# Patient Record
Sex: MALE | Race: WHITE | Employment: OTHER | URBAN - METROPOLITAN AREA
[De-identification: names, ages, dates, MRNs, and addresses within clinical notes are randomized per-mention and may not be internally consistent; named-entity substitution may affect disease eponyms.]

---

## 2017-08-01 ENCOUNTER — HOSPITAL ENCOUNTER (OUTPATIENT)
Dept: LAB | Age: 77
Discharge: HOME | End: 2017-08-01
Payer: MEDICARE

## 2017-08-01 PROCEDURE — 88305 TISSUE EXAM BY PATHOLOGIST: CPT | Performed by: SURGERY

## 2018-03-03 ENCOUNTER — ANESTHESIA EVENT (OUTPATIENT)
Dept: ENDOSCOPY | Age: 78
End: 2018-03-03
Payer: MEDICARE

## 2018-03-05 ENCOUNTER — HOSPITAL ENCOUNTER (OUTPATIENT)
Age: 78
Setting detail: OUTPATIENT SURGERY
Discharge: HOME | End: 2018-03-05
Attending: INTERNAL MEDICINE | Admitting: INTERNAL MEDICINE
Payer: MEDICARE

## 2018-03-05 ENCOUNTER — ANESTHESIA (OUTPATIENT)
Dept: ENDOSCOPY | Age: 78
End: 2018-03-05
Payer: MEDICARE

## 2018-03-05 VITALS
HEART RATE: 66 BPM | WEIGHT: 155 LBS | DIASTOLIC BLOOD PRESSURE: 48 MMHG | TEMPERATURE: 97.9 F | BODY MASS INDEX: 24.91 KG/M2 | HEIGHT: 66 IN | OXYGEN SATURATION: 100 % | RESPIRATION RATE: 18 BRPM | SYSTOLIC BLOOD PRESSURE: 149 MMHG

## 2018-03-05 LAB
GLUCOSE BLD STRIP.AUTO-MCNC: 68 MG/DL (ref 70–110)
GLUCOSE BLD STRIP.AUTO-MCNC: 69 MG/DL (ref 70–110)

## 2018-03-05 PROCEDURE — 74011250636 HC RX REV CODE- 250/636: Performed by: NURSE ANESTHETIST, CERTIFIED REGISTERED

## 2018-03-05 PROCEDURE — 76060000032 HC ANESTHESIA 0.5 TO 1 HR: Performed by: INTERNAL MEDICINE

## 2018-03-05 PROCEDURE — 74011000250 HC RX REV CODE- 250

## 2018-03-05 PROCEDURE — 77030038604 HC SNR ENDO EXACTO USEN -B: Performed by: INTERNAL MEDICINE

## 2018-03-05 PROCEDURE — 77030009426 HC FCPS BIOP ENDOSC BSC -B: Performed by: INTERNAL MEDICINE

## 2018-03-05 PROCEDURE — 88305 TISSUE EXAM BY PATHOLOGIST: CPT | Performed by: INTERNAL MEDICINE

## 2018-03-05 PROCEDURE — 74011250637 HC RX REV CODE- 250/637: Performed by: INTERNAL MEDICINE

## 2018-03-05 PROCEDURE — 82962 GLUCOSE BLOOD TEST: CPT

## 2018-03-05 PROCEDURE — 74011250636 HC RX REV CODE- 250/636

## 2018-03-05 PROCEDURE — 76040000007: Performed by: INTERNAL MEDICINE

## 2018-03-05 RX ORDER — DEXTROMETHORPHAN/PSEUDOEPHED 2.5-7.5/.8
1.2 DROPS ORAL
Status: CANCELLED | OUTPATIENT
Start: 2018-03-05

## 2018-03-05 RX ORDER — SODIUM CHLORIDE 0.9 % (FLUSH) 0.9 %
5-10 SYRINGE (ML) INJECTION EVERY 8 HOURS
Status: CANCELLED | OUTPATIENT
Start: 2018-03-05 | End: 2018-03-05

## 2018-03-05 RX ORDER — SODIUM CHLORIDE 0.9 % (FLUSH) 0.9 %
5-10 SYRINGE (ML) INJECTION AS NEEDED
Status: CANCELLED | OUTPATIENT
Start: 2018-03-05 | End: 2018-03-05

## 2018-03-05 RX ORDER — NALOXONE HYDROCHLORIDE 0.4 MG/ML
0.4 INJECTION, SOLUTION INTRAMUSCULAR; INTRAVENOUS; SUBCUTANEOUS
Status: CANCELLED | OUTPATIENT
Start: 2018-03-05 | End: 2018-03-05

## 2018-03-05 RX ORDER — SODIUM CHLORIDE, SODIUM LACTATE, POTASSIUM CHLORIDE, CALCIUM CHLORIDE 600; 310; 30; 20 MG/100ML; MG/100ML; MG/100ML; MG/100ML
75 INJECTION, SOLUTION INTRAVENOUS CONTINUOUS
Status: DISCONTINUED | OUTPATIENT
Start: 2018-03-05 | End: 2018-03-05 | Stop reason: HOSPADM

## 2018-03-05 RX ORDER — PROPOFOL 10 MG/ML
INJECTION, EMULSION INTRAVENOUS AS NEEDED
Status: DISCONTINUED | OUTPATIENT
Start: 2018-03-05 | End: 2018-03-05 | Stop reason: HOSPADM

## 2018-03-05 RX ORDER — ATROPINE SULFATE 0.1 MG/ML
0.5 INJECTION INTRAVENOUS
Status: CANCELLED | OUTPATIENT
Start: 2018-03-05 | End: 2018-03-05

## 2018-03-05 RX ORDER — FLUMAZENIL 0.1 MG/ML
0.2 INJECTION INTRAVENOUS
Status: CANCELLED | OUTPATIENT
Start: 2018-03-05 | End: 2018-03-05

## 2018-03-05 RX ORDER — SODIUM CHLORIDE 0.9 % (FLUSH) 0.9 %
5-10 SYRINGE (ML) INJECTION EVERY 8 HOURS
Status: DISCONTINUED | OUTPATIENT
Start: 2018-03-05 | End: 2018-03-05 | Stop reason: HOSPADM

## 2018-03-05 RX ORDER — EPINEPHRINE 0.1 MG/ML
1 INJECTION INTRACARDIAC; INTRAVENOUS
Status: CANCELLED | OUTPATIENT
Start: 2018-03-05 | End: 2018-03-05

## 2018-03-05 RX ORDER — INSULIN LISPRO 100 [IU]/ML
INJECTION, SOLUTION INTRAVENOUS; SUBCUTANEOUS ONCE
Status: DISCONTINUED | OUTPATIENT
Start: 2018-03-05 | End: 2018-03-05 | Stop reason: HOSPADM

## 2018-03-05 RX ORDER — LIDOCAINE HYDROCHLORIDE 20 MG/ML
INJECTION, SOLUTION EPIDURAL; INFILTRATION; INTRACAUDAL; PERINEURAL AS NEEDED
Status: DISCONTINUED | OUTPATIENT
Start: 2018-03-05 | End: 2018-03-05 | Stop reason: HOSPADM

## 2018-03-05 RX ORDER — DEXTROMETHORPHAN/PSEUDOEPHED 2.5-7.5/.8
DROPS ORAL AS NEEDED
Status: DISCONTINUED | OUTPATIENT
Start: 2018-03-05 | End: 2018-03-05 | Stop reason: HOSPADM

## 2018-03-05 RX ORDER — EPHEDRINE SULFATE 50 MG/ML
INJECTION, SOLUTION INTRAVENOUS AS NEEDED
Status: DISCONTINUED | OUTPATIENT
Start: 2018-03-05 | End: 2018-03-05 | Stop reason: HOSPADM

## 2018-03-05 RX ORDER — SODIUM CHLORIDE 0.9 % (FLUSH) 0.9 %
5-10 SYRINGE (ML) INJECTION AS NEEDED
Status: DISCONTINUED | OUTPATIENT
Start: 2018-03-05 | End: 2018-03-05 | Stop reason: HOSPADM

## 2018-03-05 RX ADMIN — PROPOFOL 50 MG: 10 INJECTION, EMULSION INTRAVENOUS at 11:44

## 2018-03-05 RX ADMIN — PROPOFOL 100 MG: 10 INJECTION, EMULSION INTRAVENOUS at 11:32

## 2018-03-05 RX ADMIN — PROPOFOL 50 MG: 10 INJECTION, EMULSION INTRAVENOUS at 11:35

## 2018-03-05 RX ADMIN — EPHEDRINE SULFATE 5 MG: 50 INJECTION, SOLUTION INTRAVENOUS at 11:45

## 2018-03-05 RX ADMIN — PROPOFOL 50 MG: 10 INJECTION, EMULSION INTRAVENOUS at 11:54

## 2018-03-05 RX ADMIN — LIDOCAINE HYDROCHLORIDE 40 MG: 20 INJECTION, SOLUTION EPIDURAL; INFILTRATION; INTRACAUDAL; PERINEURAL at 11:32

## 2018-03-05 RX ADMIN — EPHEDRINE SULFATE 5 MG: 50 INJECTION, SOLUTION INTRAVENOUS at 11:39

## 2018-03-05 NOTE — PROCEDURES
[email or web address]  [place], [address]      Brief Procedure Note    Lurdes Gram  1940  [id number]    Date of Procedure: 3/5/2018    Preoperative diagnosis: -V12.72 - Z86.010,  Personal history of colonic polyps  250.00,  NIDDM  401.9 - I10,  Hypertension  F03.90,  Mild dementia    Postoperative diagnosis: cecal polyp, ascending polyp, diverticulosis, descending polyp, small internal hemorrhoids    Type of Anesthesia: MAC (Monitored anesthesia care)    Description of findings: same as post op dx    Procedure: Procedure(s):  COLONOSCOPY w/ biopsies w/ polypectomy    :  Dr. Geovanna Sanchez MD    Assistant(s): Endoscopy Technician-1: Diego Jennings  Endoscopy RN-1: Shira Duval RN    EBL:None    Specimens:   ID Type Source Tests Collected by Time Destination   1 : cecal polyp bxs Preservative Cecum  Geovanna Sanchez MD 3/5/2018 1141 Pathology   2 : ascending polyp bxs Preservative Colon, Ascending  Geovanna Sanchez MD 3/5/2018 1145 Pathology   3 : descending polyp Preservative Colon, Descending  Geovanna Sanchez MD 3/5/2018 1154 Pathology       Findings: See printed and scanned procedure note    Complications: None    Dr. Geovanna Sanchez MD  3/5/2018  12:05 PM

## 2018-03-05 NOTE — ANESTHESIA PREPROCEDURE EVALUATION
Anesthetic History   No history of anesthetic complications            Review of Systems / Medical History  Patient summary reviewed and pertinent labs reviewed    Pulmonary          Shortness of breath      Comments: H/o of resp failure   Neuro/Psych         Psychiatric history     Cardiovascular    Hypertension: poorly controlled        Dysrhythmias : atrial fibrillation  Hyperlipidemia    Exercise tolerance: <4 METS     GI/Hepatic/Renal     GERD  Hepatitis         Endo/Other    Diabetes: type 2         Other Findings   Comments: Documentation of current medication  Current medications obtained, documented and obtained? YES      Risk Factors for Postoperative nausea/vomiting:       History of postoperative nausea/vomiting? NO       Female? NO       Motion sickness? NO       Intended opioid administration for postoperative analgesia? NO      Smoking Abstinence:  Current Smoker? NO  Elective Surgery? YES  Seen preoperatively by anesthesiologist or proxy prior to day of surgery? YES  Pt abstained from smoking 24 hours prior to anesthesia?  YES    Preventive care/screening for High Blood Pressure:  Aged 18 years and older: YES  Screened for high blood pressure: YES  Patients with high blood pressure referred to primary care provider   for BP management: YES                 Physical Exam    Airway  Mallampati: III  TM Distance: > 6 cm  Neck ROM: normal range of motion   Mouth opening: Normal     Cardiovascular    Rhythm: irregular  Rate: normal         Dental    Dentition: Edentulous     Pulmonary      Decreased breath sounds: bilateral           Abdominal  GI exam deferred       Other Findings            Anesthetic Plan    ASA: 3  Anesthesia type: MAC          Induction: Intravenous  Anesthetic plan and risks discussed with: Patient

## 2018-03-05 NOTE — DISCHARGE INSTRUCTIONS
Colonoscopy: What to Expect at [address] [place] [address]  After you have a colonoscopy, you will stay at the clinic for 1 to 2 hours until the medicines wear off. Then you can go home. But you will need to arrange for a ride. Your doctor will tell you when you can eat and do your other usual activities. Your doctor will talk to you about when you will need your next colonoscopy. Your doctor can help you decide how often you need to be checked. This will depend on the results of your test and your risk for colorectal cancer. After the test, you may be bloated or have gas pains. You may need to pass gas. If a biopsy was done or a polyp was removed, you may have streaks of blood in your stool (feces) for a few days. This care sheet gives you a general idea about how long it will take for you to recover. But each person recovers at a different pace. Follow the steps below to get better as quickly as possible. How can you care for yourself at home? Activity  · Rest when you feel tired. · You can do your normal activities when it feels okay to do so. Diet  · Follow your doctor's directions for eating. · Unless your doctor has told you not to, drink plenty of fluids. This helps to replace the fluids that were lost during the colon prep. · Do not drink alcohol. Medicines  · If polyps were removed or a biopsy was done during the test, your doctor may tell you not to take aspirin or other anti-inflammatory medicines for a few days. These include ibuprofen (Advil, Motrin) and naproxen (Aleve). Other instructions  · For your safety, do not drive or operate machinery until the medicine wears off and you can think clearly. Your doctor may tell you not to drive or operate machinery until the day after your test.  · Do not sign legal documents or make major decisions until the medicine wears off and you can think clearly. The anesthesia can make it hard for you to fully understand what you are agreeing to.   Follow-up care is a key part of your treatment and safety. Be sure to make and go to all appointments, and call your doctor if you are having problems. It's also a good idea to know your test results and keep a list of the medicines you take. When should you call for help? Call 911 anytime you think you may need emergency care. For example, call if:  · You passed out (lost consciousness). · You pass maroon or bloody stools. · You have severe belly pain. Call your doctor now or seek immediate medical care if:  · Your stools are black and tarlike. · Your stools have streaks of blood, but you did not have a biopsy or any polyps removed. · You have belly pain, or your belly is swollen and firm. · You vomit. · You have a fever. · You are very dizzy. Watch closely for changes in your health, and be sure to contact your doctor if you have any problems. Where can you learn more? Go to [employer]  Enter E264 in the search box to learn more about \"Colonoscopy: What to Expect at Home. \"   © 1196-1148 Healthwise, Incorporated. Care instructions adapted under license by Cristina Alvarado (which disclaims liability or warranty for this information). This care instruction is for use with your licensed healthcare professional. If you have questions about a medical condition or this instruction, always ask your healthcare professional. [address] any warranty or liability for your use of this information. Content Version: 11.1.860247; Current as of: November 14, 2014     Colon Polyps: Care Instructions  Your Care Instructions    Colon polyps are growths in the colon or the rectum. The cause of most colon polyps is not known, and most people who get them do not have any problems. But a certain kind can turn into cancer. For this reason, regular testing for colon polyps is important for people age 48 and older and anyone who has an increased risk for colon cancer.   Polyps are usually found through routine colon cancer screening tests. Although most colon polyps are not cancerous, they are usually removed and then tested for cancer. Screening for colon cancer saves lives because the cancer can usually be cured if it is caught early. If you have a polyp that is the type that can turn into cancer, you may need more tests to examine your entire colon. The doctor will remove any other polyps that he or she finds, and you will be tested more often. Follow-up care is a key part of your treatment and safety. Be sure to make and go to all appointments, and call your doctor if you are having problems. It's also a good idea to know your test results and keep a list of the medicines you take. How can you care for yourself at home? Regular exams to look for colon polyps are the best way to prevent polyps from turning into colon cancer. These can include stool tests, sigmoidoscopy, colonoscopy, and CT colonography. Talk with your doctor about a testing schedule that is right for you. To prevent polyps  There is no home treatment that can prevent colon polyps. But these steps may help lower your risk for cancer. · Stay active. Being active can help you get to and stay at a healthy weight. Try to exercise on most days of the week. Walking is a good choice. · Eat well. Choose a variety of vegetables, fruits, legumes (such as peas and beans), fish, poultry, and whole grains. · Do not smoke. If you need help quitting, talk to your doctor about stop-smoking programs and medicines. These can increase your chances of quitting for good. · If you drink alcohol, limit how much you drink. Limit alcohol to 2 drinks a day for men and 1 drink a day for women. When should you call for help? Call your doctor now or seek immediate medical care if:  ? · You have severe belly pain. ? · Your stools are maroon or very bloody. ? Watch closely for changes in your health, and be sure to contact your doctor if:  ? · You have a fever.   ? · You have nausea or vomiting. ? · You have a change in bowel habits (new constipation or diarrhea). ? · Your symptoms get worse or are not improving as expected. Where can you learn more? Go to http://alyce-dalila.info/. Enter 95 [id number] in the search box to learn more about \"Colon Polyps: Care Instructions. \"  Current as of: May 12, 2017  Content Version: 11.4  © 2607-1080 [employer]. Care instructions adapted under license by [employer] (which disclaims liability or warranty for this information). If you have questions about a medical condition or this instruction, always ask your healthcare professional. [place] [address] any warranty or liability for your use of this information. Learning About Diverticulosis and Diverticulitis  What are diverticulosis and diverticulitis? In diverticulosis and diverticulitis, pouches called diverticula form in the wall of the large intestine, or colon. · In diverticulosis, the pouches do not cause any pain or other symptoms. · In diverticulitis, the pouches get inflamed or infected and cause symptoms. Doctors aren't sure what causes these pouches in the colon. But they think that a low-fiber diet may play a role. Without fiber to add bulk to the stool, the colon has to work harder than normal to push the stool forward. The pressure from this may cause pouches to form in weak spots along the colon. Some people with diverticulosis get diverticulitis. But experts don't know why this happens. What are the symptoms? · In diverticulosis, most people don't have symptoms. But pouches sometimes bleed. · In diverticulitis, symptoms may last from a few hours to a week or more. They include:  ¨ Belly pain. This is usually in the lower left side. It is sometimes worse when you move. This is the most common symptom. ¨ Fever and chills. ¨ Bloating and gas. ¨ Diarrhea or constipation.   ¨ Nausea and sometimes vomiting. ¨ Not feeling like eating. How can you prevent these problems? You may be able to lower your chance of getting diverticulitis. You can do this by taking steps to prevent constipation. · Eat fruits, vegetables, beans, and whole grains every day. These foods are high in fiber. · Drink plenty of fluids (enough so that your urine is light yellow or clear like water). If you have kidney, heart, or liver disease and have to limit fluids, talk with your doctor before you increase the amount of fluids you drink. · Get at least 30 minutes of exercise on most days of the week. Walking is a good choice. You also may want to do other activities, such as running, swimming, cycling, or playing tennis or team sports. · Take a fiber supplement, such as Citrucel or Metamucil, every day if needed. Read and follow all instructions on the label. · Schedule time each day for a bowel movement. Having a daily routine may help. Take your time and do not strain when having a bowel movement. Some people avoid nuts, seeds, berries, and popcorn. They believe that these foods might get trapped in the diverticula and cause pain. But there is no proof that these foods cause diverticulitis or make it worse. How are these problems treated? · The best way to treat diverticulosis is to avoid constipation. (See the tips above.)  · Treatment for diverticulitis includes antibiotics and often a change in your diet. You may need only liquids at first. Your doctor may suggest pain medicines for pain or belly cramps. In some cases, surgery may be needed. Follow-up care is a key part of your treatment and safety. Be sure to make and go to all appointments, and call your doctor if you are having problems. It's also a good idea to know your test results and keep a list of the medicines you take. Where can you learn more? Go to http://alyce-dalila.info/.   Enter I564 in the search box to learn more about \"Learning About Diverticulosis and Diverticulitis. \"  Current as of: May 12, 2017  Content Version: 11.4  © 9789-0255 [employer]. Care instructions adapted under license by [employer] (which disclaims liability or warranty for this information). If you have questions about a medical condition or this instruction, always ask your healthcare professional. [address] any warranty or liability for your use of this information. High-Fiber Diet: Care Instructions  Your Care Instructions    A high-fiber diet may help you relieve constipation and feel less bloated. Your doctor and dietitian will help you make a high-fiber eating plan based on your personal needs. The plan will include the things you like to eat. It will also make sure that you get 30 grams of fiber a day. Before you make changes to the way you eat, be sure to talk with your doctor or dietitian. Follow-up care is a key part of your treatment and safety. Be sure to make and go to all appointments, and call your doctor if you are having problems. It's also a good idea to know your test results and keep a list of the medicines you take. How can you care for yourself at home? · You can increase how much fiber you get if you eat more of certain foods. These foods include:  ¨ Whole-grain breads and cereals. ¨ Fruits, such as pears, apples, and peaches. Eat the skins, peels, and seeds, if you can. ¨ Vegetables, such as broccoli, cabbage, spinach, carrots, asparagus, and squash. ¨ Starchy vegetables. These include potatoes with skins, kidney beans, and lima beans. · Take a fiber supplement every day if your doctor recommends it. Examples are Benefiber, Citrucel, FiberCon, and Metamucil. Ask your doctor how much to take. · Drink plenty of fluids, enough so that your urine is light yellow or clear like water.  If you have kidney, heart, or liver disease and have to limit fluids, talk with your doctor before you increase the amount of fluids you drink. · Get some exercise every day. Exercise helps stool move through the colon. It also helps prevent constipation. · Keep a food diary. Try to notice and write down what foods cause gas, pain, or other symptoms. Then you can avoid these foods. Where can you learn more? Go to http://alyce-dalila.info/. Enter C604 in the search box to learn more about \"High-Fiber Diet: Care Instructions. \"  Current as of: May 12, 2017  Content Version: 11.4  © 9863-8161 [employer]. Care instructions adapted under license by [employer] (which disclaims liability or warranty for this information). If you have questions about a medical condition or this instruction, always ask your healthcare professional. [address] any warranty or liability for your use of this information. DISCHARGE SUMMARY from Nurse     POST-PROCEDURE INSTRUCTIONS:    Call your Physician if you:  ? Observe any excess bleeding. ? Develop a temperature over 100.5o F.  ? Experience abdominal, shoulder or chest pain. ? Notice any signs of decreased circulation or nerve impairment to an extremity such as a change in color, persistent numbness, tingling, coldness or increase in pain. ? Vomit blood or you have nausea and vomiting lasting longer than 4 hours. ? Are unable to take medications. ? Are unable to urinate within 8 hours after discharge following general anesthesia or intravenous sedation. For the next 24 hours after receiving general anesthesia or intravenous sedation, or while taking prescription Narcotics, limit your activities:  ? Do NOT drive a motor vehicle, operate hazard machinery or power tools, or perform tasks that require coordination. The medication you received during your procedure may have some effect on your mental awareness. ? Do NOT make important personal or business decisions.   The medication you received during your procedure may have some effect on your mental awareness. ? Do NOT drink alcoholic beverages. These drinks do not mix well with the medications that have been given to you. ? Upon discharge from the hospital, you must be accompanied by a responsible adult. ? Resume your diet as directed by your physician. ? Resume medications as your physician has prescribed. ? Please give a list of your current medications to your Primary Care Provider. ? Please update this list whenever your medications are discontinued, doses are changed, or new medications (including over-the-counter products) are added. ? Please carry medication information at all times in case of emergency situations. These are general instructions for a healthy lifestyle:    No smoking/ No tobacco products/ Avoid exposure to second hand smoke.  Surgeon General's Warning:  Quitting smoking now greatly reduces serious risk to your health. Obesity, smoking, and a sedentary lifestyle greatly increase your risk for illness.  A healthy diet, regular physical exercise & weight monitoring are important for maintaining a healthy lifestyle   You may be retaining fluid if you have a history of heart failure or if you experience any of the following symptoms:  Weight gain of 3 pounds or more overnight or 5 pounds in a week, increased swelling in our hands or feet or shortness of breath while lying flat in bed. Please call your doctor as soon as you notice any of these symptoms; do not wait until your next office visit. Recognize signs and symptoms of STROKE:  F  -  Face looks uneven  A  -  Arms unable to move or move unevenly  S  -  Speech slurred or non-existent  T  -  Time to call 911 - as soon as signs and symptoms begin - DO NOT go back to bed or wait to see If you get better - TIME IS BRAIN. Colorectal Screening   Colorectal cancer almost always develops from precancerous polyps (abnormal growths) in the colon or rectum. Screening tests can find precancerous polyps, so that they can be removed before they turn into cancer. Screening tests can also find colorectal cancer early, when treatment works best.  [facility] Speak with your physician about when you should begin screening and how often you should be tested. Additional Information    If you have questions, please call [phone]. Remember, MyChart is NOT to be used for urgent needs. For medical emergencies, dial 911. Educational references and/or instructions provided during this visit included:    Colon Polyps and Diverticulosis      APPOINTMENTS:    Please make a follow-up appointment with your physician. Discharge information has been reviewed with the patient and spouse. The patient and spouse verbalized understanding.

## 2018-03-05 NOTE — ANESTHESIA POSTPROCEDURE EVALUATION
Post-Anesthesia Evaluation and Assessment    Patient: Thania Peterson MRN: [id number]  SSN: [id number]    YOB: 1940  Age: 69 y.o. Sex: male       Cardiovascular Function/Vital Signs  Visit Vitals    /67    Pulse 67    Temp 36.6 °C (97.9 °F)    Resp 16    Ht 5' 6\" (1.676 m)    Wt 70.3 kg (155 lb)    SpO2 98%    BMI 25.02 kg/m2       Patient is status post MAC anesthesia for Procedure(s):  COLONOSCOPY w/ biopsies w/ polypectomy. Nausea/Vomiting: None    Postoperative hydration reviewed and adequate. Pain:  Pain Scale 1: Numeric (0 - 10) (03/05/18 1104)  Pain Intensity 1: 0 (03/05/18 1104)   Managed    Neurological Status: At baseline    Mental Status and Level of Consciousness: Arousable    Pulmonary Status:   O2 Device: Nasal cannula (03/05/18 1201)   Adequate oxygenation and airway patent    Complications related to anesthesia: None    Post-anesthesia assessment completed.  No concerns      Signed By: Jace Hilton MD     March 5, 2018

## 2018-03-05 NOTE — PERIOP NOTES
RN notified Dr. Destiney Espana the patients blood glucose is 69. Dr. Destiney Espana informed the RN the blood sugar will be rechecked in post op. RN verbalized understanding & informed DENISE Del Cid RN with verbal understanding noted.

## 2018-03-05 NOTE — IP AVS SNAPSHOT
[facility], [place], operated by [facility] 
 
 
 [address] [address] [address] [address] [phone] Patient: Isaac Gutierrez MRN: [id number] SARAH:[id number] About your hospitalization You were admitted on:  March 5, 2018 You last received care in the:  [facility] You were discharged on:  March 5, 2018 Why you were hospitalized Your primary diagnosis was:  Not on File Follow-up Information Follow up With Details Comments Contact Info Norman Gupta, [address] [address] [facility] 
[phone] Discharge Orders None A check juany indicates which time of day the medication should be taken. My Medications ASK your doctor about these medications Instructions Each Dose to Equal  
 Morning Noon Evening Bedtime  
 amLODIPine 5 mg tablet Commonly known as:  Arva Matt Your last dose was: Your next dose is: Take 5 mg by mouth daily. 5 mg  
    
   
   
   
  
 citalopram 20 mg tablet Commonly known as:  Cricketsantos Swain Your last dose was: Your next dose is: Take  by mouth daily. diclofenac potassium 50 mg tablet Commonly known as:  CATAFLAM  
   
Your last dose was: Your next dose is: Take 50 mg by mouth three (3) times daily. 50 mg  
    
   
   
   
  
 donepezil 10 mg tablet Commonly known as:  ARICEPT Your last dose was: Your next dose is: Take 10 mg by mouth nightly. 10 mg  
    
   
   
   
  
 JANUVIA 50 mg tablet Generic drug:  SITagliptin Your last dose was: Your next dose is: Take 50 mg by mouth daily. Indications: Patient/wife unsure of Mg  
 50 mg  
    
   
   
   
  
 lisinopril-hydroCHLOROthiazide 20-12.5 mg per tablet Commonly known as:  Kev Naranjo Your last dose was: Your next dose is: Take  by mouth daily. ondansetron 4 mg disintegrating tablet Commonly known as:  ZOFRAN ODT Your last dose was: Your next dose is: Take 4 mg by mouth every eight (8) hours as needed for Nausea. 4 mg  
    
   
   
   
  
 simvastatin 20 mg tablet Commonly known as:  ZOCOR Your last dose was: Your next dose is: Take  by mouth nightly. Discharge Instructions Colonoscopy: What to Expect at [facility] Your Recovery After you have a colonoscopy, you will stay at the clinic for 1 to 2 hours until the medicines wear off. Then you can go home. But you will need to arrange for a ride. Your doctor will tell you when you can eat and do your other usual activities. Your doctor will talk to you about when you will need your next colonoscopy. Your doctor can help you decide how often you need to be checked. This will depend on the results of your test and your risk for colorectal cancer. After the test, you may be bloated or have gas pains. You may need to pass gas. If a biopsy was done or a polyp was removed, you may have streaks of blood in your stool (feces) for a few days. This care sheet gives you a general idea about how long it will take for you to recover. But each person recovers at a different pace. Follow the steps below to get better as quickly as possible. How can you care for yourself at home? Activity · Rest when you feel tired. · You can do your normal activities when it feels okay to do so. Diet · Follow your doctor's directions for eating. · Unless your doctor has told you not to, drink plenty of fluids. This helps to replace the fluids that were lost during the colon prep. · Do not drink alcohol. Medicines · If polyps were removed or a biopsy was done during the test, your doctor may tell you not to take aspirin or other anti-inflammatory medicines for a few days. These include ibuprofen (Advil, Motrin) and naproxen (Aleve). Other instructions · For your safety, do not drive or operate machinery until the medicine wears off and you can think clearly. Your doctor may tell you not to drive or operate machinery until the day after your test. 
· Do not sign legal documents or make major decisions until the medicine wears off and you can think clearly. The anesthesia can make it hard for you to fully understand what you are agreeing to. Follow-up care is a key part of your treatment and safety. Be sure to make and go to all appointments, and call your doctor if you are having problems. It's also a good idea to know your test results and keep a list of the medicines you take. When should you call for help? Call 911 anytime you think you may need emergency care. For example, call if: 
· You passed out (lost consciousness). · You pass maroon or bloody stools. · You have severe belly pain. Call your doctor now or seek immediate medical care if: 
· Your stools are black and tarlike. · Your stools have streaks of blood, but you did not have a biopsy or any polyps removed. · You have belly pain, or your belly is swollen and firm. · You vomit. · You have a fever. · You are very dizzy. Watch closely for changes in your health, and be sure to contact your doctor if you have any problems. Where can you learn more? Go to HabitRPG.be Enter E264 in the search box to learn more about \"Colonoscopy: What to Expect at Home. \"  
© 0441-5326 Healthwise, Incorporated. Care instructions adapted under license by [employer] (which disclaims liability or warranty for this information).  This care instruction is for use with your licensed healthcare professional. If you have questions about a medical condition or this instruction, always ask your healthcare professional. [address] any warranty or liability for your use of this information. Content Version: 41.0.425638; Current as of: November 14, 2014 Colon Polyps: Care Instructions Your Care Instructions Colon polyps are growths in the colon or the rectum. The cause of most colon polyps is not known, and most people who get them do not have any problems. But a certain kind can turn into cancer. For this reason, regular testing for colon polyps is important for people age 48 and older and anyone who has an increased risk for colon cancer. Polyps are usually found through routine colon cancer screening tests. Although most colon polyps are not cancerous, they are usually removed and then tested for cancer. Screening for colon cancer saves lives because the cancer can usually be cured if it is caught early. If you have a polyp that is the type that can turn into cancer, you may need more tests to examine your entire colon. The doctor will remove any other polyps that he or she finds, and you will be tested more often. Follow-up care is a key part of your treatment and safety. Be sure to make and go to all appointments, and call your doctor if you are having problems. It's also a good idea to know your test results and keep a list of the medicines you take. How can you care for yourself at home? Regular exams to look for colon polyps are the best way to prevent polyps from turning into colon cancer. These can include stool tests, sigmoidoscopy, colonoscopy, and CT colonography. Talk with your doctor about a testing schedule that is right for you. To prevent polyps There is no home treatment that can prevent colon polyps. But these steps may help lower your risk for cancer. · Stay active. Being active can help you get to and stay at a healthy weight. Try to exercise on most days of the week. Walking is a good choice. · Eat well. Choose a variety of vegetables, fruits, legumes (such as peas and beans), fish, poultry, and whole grains. · Do not smoke. If you need help quitting, talk to your doctor about stop-smoking programs and medicines. These can increase your chances of quitting for good. · If you drink alcohol, limit how much you drink. Limit alcohol to 2 drinks a day for men and 1 drink a day for women. When should you call for help? Call your doctor now or seek immediate medical care if: 
? · You have severe belly pain. ? · Your stools are maroon or very bloody. ? Watch closely for changes in your health, and be sure to contact your doctor if: 
? · You have a fever. ? · You have nausea or vomiting. ? · You have a change in bowel habits (new constipation or diarrhea). ? · Your symptoms get worse or are not improving as expected. Where can you learn more? Go to http://alyce-dalila.info/. Enter 95 [id number] in the search box to learn more about \"Colon Polyps: Care Instructions. \" Current as of: May 12, 2017 Content Version: 11.4 © 3234-7328 Share[employer]. Care instructions adapted under license by CrowdTransfer (which disclaims liability or warranty for this information). If you have questions about a medical condition or this instruction, always ask your healthcare professional. [address] any warranty or liability for your use of this information. Learning About Diverticulosis and Diverticulitis What are diverticulosis and diverticulitis? In diverticulosis and diverticulitis, pouches called diverticula form in the wall of the large intestine, or colon. · In diverticulosis, the pouches do not cause any pain or other symptoms. · In diverticulitis, the pouches get inflamed or infected and cause symptoms. Doctors aren't sure what causes these pouches in the colon. But they think that a low-fiber diet may play a role. Without fiber to add bulk to the stool, the colon has to work harder than normal to push the stool forward. The pressure from this may cause pouches to form in weak spots along the colon. Some people with diverticulosis get diverticulitis. But experts don't know why this happens. What are the symptoms? · In diverticulosis, most people don't have symptoms. But pouches sometimes bleed. · In diverticulitis, symptoms may last from a few hours to a week or more. They include: ¨ Belly pain. This is usually in the lower left side. It is sometimes worse when you move. This is the most common symptom. ¨ Fever and chills. ¨ Bloating and gas. ¨ Diarrhea or constipation. ¨ Nausea and sometimes vomiting. ¨ Not feeling like eating. How can you prevent these problems? You may be able to lower your chance of getting diverticulitis. You can do this by taking steps to prevent constipation. · Eat fruits, vegetables, beans, and whole grains every day. These foods are high in fiber. · Drink plenty of fluids (enough so that your urine is light yellow or clear like water). If you have kidney, heart, or liver disease and have to limit fluids, talk with your doctor before you increase the amount of fluids you drink. · Get at least 30 minutes of exercise on most days of the week. Walking is a good choice. You also may want to do other activities, such as running, swimming, cycling, or playing tennis or team sports. · Take a fiber supplement, such as Citrucel or Metamucil, every day if needed. Read and follow all instructions on the label. · Schedule time each day for a bowel movement. Having a daily routine may help. Take your time and do not strain when having a bowel movement. Some people avoid nuts, seeds, berries, and popcorn. They believe that these foods might get trapped in the diverticula and cause pain. But there is no proof that these foods cause diverticulitis or make it worse. How are these problems treated? · The best way to treat diverticulosis is to avoid constipation. (See the tips above.) · Treatment for diverticulitis includes antibiotics and often a change in your diet. You may need only liquids at first. Your doctor may suggest pain medicines for pain or belly cramps. In some cases, surgery may be needed. Follow-up care is a key part of your treatment and safety. Be sure to make and go to all appointments, and call your doctor if you are having problems. It's also a good idea to know your test results and keep a list of the medicines you take. Where can you learn more? Go to http://alyce-dalila.info/. Enter G156 in the search box to learn more about \"Learning About Diverticulosis and Diverticulitis. \" Current as of: May 12, 2017 Content Version: 11.4 © 5828-1781 [employer]. Care instructions adapted under license by [employer]ist[employer] (which disclaims liability or warranty for this information). If you have questions about a medical condition or this instruction, always ask your healthcare professional. [place] [address] any warranty or liability for your use of this information. High-Fiber Diet: Care Instructions Your Care Instructions A high-fiber diet may help you relieve constipation and feel less bloated. Your doctor and dietitian will help you make a high-fiber eating plan based on your personal needs. The plan will include the things you like to eat. It will also make sure that you get 30 grams of fiber a day. Before you make changes to the way you eat, be sure to talk with your doctor or dietitian. Follow-up care is a key part of your treatment and safety. Be sure to make and go to all appointments, and call your doctor if you are having problems. It's also a good idea to know your test results and keep a list of the medicines you take. How can you care for yourself at home? · You can increase how much fiber you get if you eat more of certain foods. These foods include: ¨ Whole-grain breads and cereals. ¨ Fruits, such as pears, apples, and peaches. Eat the skins, peels, and seeds, if you can. ¨ Vegetables, such as broccoli, cabbage, spinach, carrots, asparagus, and squash. ¨ Starchy vegetables. These include potatoes with skins, kidney beans, and lima beans. · Take a fiber supplement every day if your doctor recommends it. Examples are Benefiber, Citrucel, FiberCon, and Metamucil. Ask your doctor how much to take. · Drink plenty of fluids, enough so that your urine is light yellow or clear like water. If you have kidney, heart, or liver disease and have to limit fluids, talk with your doctor before you increase the amount of fluids you drink. · Get some exercise every day. Exercise helps stool move through the colon. It also helps prevent constipation. · Keep a food diary. Try to notice and write down what foods cause gas, pain, or other symptoms. Then you can avoid these foods. Where can you learn more? Go to http://alyce-dalila.info/. Enter E382 in the search box to learn more about \"High-Fiber Diet: Care Instructions. \" Current as of: May 12, 2017 Content Version: 11.4 © 4704-1891 [employer]arter[employer]. Care instructions adapted under license by [employer] Warrior (which disclaims liability or warranty for this information). If you have questions about a medical condition or this instruction, always ask your healthcare professional. [place] [address] any warranty or liability for your use of this information. DISCHARGE SUMMARY from Nurse POST-PROCEDURE INSTRUCTIONS: 
 
Call your Physician if you: 
? Observe any excess bleeding. ? Develop a temperature over 100.5o F. 
? Experience abdominal, shoulder or chest pain. ? Notice any signs of decreased circulation or nerve impairment to an extremity such as a change in color, persistent numbness, tingling, coldness or increase in pain. ? Vomit blood or you have nausea and vomiting lasting longer than 4 hours. ? Are unable to take medications. ? Are unable to urinate within 8 hours after discharge following general anesthesia or intravenous sedation. For the next 24 hours after receiving general anesthesia or intravenous sedation, or while taking prescription Narcotics, limit your activities: 
? Do NOT drive a motor vehicle, operate hazard machinery or power tools, or perform tasks that require coordination. The medication you received during your procedure may have some effect on your mental awareness. ? Do NOT make important personal or business decisions. The medication you received during your procedure may have some effect on your mental awareness. ? Do NOT drink alcoholic beverages. These drinks do not mix well with the medications that have been given to you. ? Upon discharge from the hospital, you must be accompanied by a responsible adult. ? Resume your diet as directed by your physician. ? Resume medications as your physician has prescribed. ? Please give a list of your current medications to your Primary Care Provider. ? Please update this list whenever your medications are discontinued, doses are changed, or new medications (including over-the-counter products) are added. ? Please carry medication information at all times in case of emergency situations. These are general instructions for a healthy lifestyle: No smoking/ No tobacco products/ Avoid exposure to second hand smoke. ? Surgeon General's Warning:  Quitting smoking now greatly reduces serious risk to your health. Obesity, smoking, and a sedentary lifestyle greatly increase your risk for illness. ? A healthy diet, regular physical exercise & weight monitoring are important for maintaining a healthy lifestyle ?  You may be retaining fluid if you have a history of heart failure or if you experience any of the following symptoms:  Weight gain of 3 pounds or more overnight or 5 pounds in a week, increased swelling in our hands or feet or shortness of breath while lying flat in bed. Please call your doctor as soon as you notice any of these symptoms; do not wait until your next office visit. Recognize signs and symptoms of STROKE: 
F  -  Face looks uneven A  -  Arms unable to move or move unevenly S  -  Speech slurred or non-existent T  -  Time to call 911 - as soon as signs and symptoms begin - DO NOT go back to bed or wait to see If you get better - TIME IS BRAIN. Colorectal Screening ? Colorectal cancer almost always develops from precancerous polyps (abnormal growths) in the colon or rectum. Screening tests can find precancerous polyps, so that they can be removed before they turn into cancer. Screening tests can also find colorectal cancer early, when treatment works best. 
? Speak with your physician about when you should begin screening and how often you should be tested. Additional Information If you have questions, please call [phone]. Remember, Dittit is NOT to be used for urgent needs. For medical emergencies, dial 911. Educational references and/or instructions provided during this visit included: 
 
Colon Polyps and Diverticulosis APPOINTMENTS: 
 
Please make a follow-up appointment with your physician. Discharge information has been reviewed with the patient and spouse. The patient and spouse verbalized understanding. Introducing [facility]! Ezekiel Montes De Oca introduces Dittit patient portal. Now you can access parts of your medical record, email your doctor's office, and request medication refills online. 1. In your internet browser, go to [email or web address] 2. Click on the First Time User? Click Here link in the Sign In box. You will see the New Member Sign Up page. 3. Enter your CatchThatBus Access Code exactly as it appears below. You will not need to use this code after youve completed the sign-up process. If you do not sign up before the expiration date, you must request a new code. · CatchThatBus Access Code: [email or web address] Expires: 6/3/2018 12:05 PM 
 
4. Enter the last four digits of your Social Security Number (xxxx) and Date of Birth (mm/dd/yyyy) as indicated and click Submit. You will be taken to the next sign-up page. 5. Create a OMGt ID. This will be your CatchThatBus login ID and cannot be changed, so think of one that is secure and easy to remember. 6. Create a CatchThatBus password. You can change your password at any time. 7. Enter your Password Reset Question and Answer. This can be used at a later time if you forget your password. 8. Enter your e-mail address. You will receive e-mail notification when new information is available in [address]. 9. Click Sign Up. You can now view and download portions of your medical record. 10. Click the Download Summary menu link to download a portable copy of your medical information. If you have questions, please visit the Frequently Asked Questions section of the CatchThatBus website. Remember, CatchThatBus is NOT to be used for urgent needs. For medical emergencies, dial 911. Now available from your iPhone and Android! Providers Seen During Your Hospitalization Provider Specialty Primary office phone Cindy Lopez MD Gastroenterology [phone] Your Primary Care Physician (PCP) Primary Care Physician Office Phone Office Fax Orestesmatt Shobha [phone] You are allergic to the following Allergen Reactions Oxycontin (Oxycodone) Other (comments)  
 hallucinations Vicodin (Hydrocodone-Acetaminophen) Other (comments) Recent Documentation Height Weight BMI Smoking Status 1.676 m 70.3 kg 25.02 kg/m2 Former Smoker Emergency Contacts Name Discharge Info Relation Home Work Mobile Colon Nate  Spouse [3] [phone] Patient Belongings The following personal items are in your possession at time of discharge: 
  Dental Appliances: None  Visual Aid: Glasses, At home Please provide this summary of care documentation to your next provider. Signatures-by signing, you are acknowledging that this After Visit Summary has been reviewed with you and you have received a copy. Patient Signature:  ____________________________________________________________ Date:  ____________________________________________________________  
  
Martha Vilchis Provider Signature:  ____________________________________________________________ Date:  ____________________________________________________________

## 2018-03-05 NOTE — H&P
[email or web address]  [phone]    GASTROENTEROLOGY Pre-Procedure H and P      Impression/Plan:   1. This patient is consented for a colonoscopy for h/o colon polyps. Chief Complaint: h/o colon polyps      HPI:  Deanna Youssef is a 69 y.o. male who is being is having a colonoscopy for h/o colon polyps. Star in 2015 showed multiple SSAs and TAs. No fam hx. PMH:   Past Medical History:   Diagnosis Date    Abnormality of gait     Benign neoplasm of colon     Depression     DM (diabetes mellitus) ([facility] [address]     Headache     Hypertension     Nonspecific (abnormal) findings on radiological and other examination of genitourinary organs     Other and unspecified hyperlipidemia     Personal history of fall     Right bundle branch block and left anterior fascicular block        PSH:   Past Surgical History:   Procedure Laterality Date    HX CATARACT REMOVAL      HX CHOLECYSTECTOMY      HX COLONOSCOPY  3/12/2015    HX TONSILLECTOMY         Social HX:   Social History     Social History    Marital status:      Spouse name: N/A    Number of children: N/A    Years of education: N/A     Occupational History    Not on file. Social History Main Topics    Smoking status: Former Smoker     Types: Cigarettes    Smokeless tobacco: Never Used      Comment: Quit 40 years ago     Alcohol use Not on file    Drug use: No    Sexual activity: Not on file     Other Topics Concern    Not on file     Social History Narrative       FHX:   Family History   Problem Relation Age of Onset    Diabetes Mother     Alzheimer Mother     Cancer Father      Lung Cancer       Allergy:   Allergies   Allergen Reactions    Oxycontin [Oxycodone] Other (comments)     hallucinations    Vicodin [Hydrocodone-Acetaminophen] Other (comments)       Home Medications:     Prescriptions Prior to Admission   Medication Sig    SITagliptin (JANUVIA) 50 mg tablet Take 50 mg by mouth daily.  Indications: Patient/wife unsure of Mg    diclofenac potassium (CATAFLAM) 50 mg tablet Take 50 mg by mouth three (3) times daily.  amLODIPine (NORVASC) 5 mg tablet Take 5 mg by mouth daily.  simvastatin (ZOCOR) 20 mg tablet Take  by mouth nightly.  citalopram (CELEXA) 20 mg tablet Take  by mouth daily.  donepezil (ARICEPT) 10 mg tablet Take 10 mg by mouth nightly.  lisinopril-hydrochlorothiazide (PRINZIDE, ZESTORETIC) 20-12.5 mg per tablet Take  by mouth daily.  ondansetron (ZOFRAN ODT) 4 mg disintegrating tablet Take 4 mg by mouth every eight (8) hours as needed for Nausea. Review of Systems:     A complete 10 point review of systems was performed and pertinents are as per the HPI. Remainder of the review of systems was negative. Visit Vitals    /67    Pulse (!) 56    Temp 98.2 °F (36.8 °C)    Resp 16    Ht 5' 6\" (1.676 m)    Wt 70.3 kg (155 lb)    SpO2 97%    BMI 25.02 kg/m2       Physical Assessment:     General: alert, cooperative, no acute distress, appears stated age. HEENT: normocephalic, no scleral icterus, moist mucous membranes, EOMs intact, no neck masses noted. Respiratory: lungs clear to auscultation bilaterally. Cardiovascular: regular rate and rhythm, no murmurs, rubs or gallops. Abdomen: normal bowel sounds, soft, non-tender to palpation. Extremities: no lower extremity edema, no cyanosis or clubbing. Neuro: alert and oriented x 3; non-focal exam.  Skin: no rashes. Psych: normal mood and affect. Charan Gallegos MD  [facility]  [email or web address]  Phone: [phone]  Pager: [phone]  [email or web address]

## 2021-02-09 ENCOUNTER — TRANSCRIBE ORDER (OUTPATIENT)
Dept: SCHEDULING | Age: 81
End: 2021-02-09

## 2021-02-09 DIAGNOSIS — I10 HTN (HYPERTENSION): Primary | ICD-10-CM

## 2021-02-09 DIAGNOSIS — R60.0 LOCALIZED EDEMA: ICD-10-CM

## 2021-03-09 ENCOUNTER — HOSPITAL ENCOUNTER (OUTPATIENT)
Dept: NON INVASIVE DIAGNOSTICS | Age: 81
Discharge: HOME | End: 2021-03-09
Attending: FAMILY MEDICINE
Payer: MEDICARE

## 2021-03-09 VITALS
BODY MASS INDEX: 24.91 KG/M2 | HEIGHT: 66 IN | DIASTOLIC BLOOD PRESSURE: 48 MMHG | SYSTOLIC BLOOD PRESSURE: 149 MMHG | WEIGHT: 155 LBS

## 2021-03-09 DIAGNOSIS — I10 HTN (HYPERTENSION): ICD-10-CM

## 2021-03-09 DIAGNOSIS — R60.0 LOCALIZED EDEMA: ICD-10-CM

## 2021-03-09 LAB
ECHO AO ROOT DIAM: 3.4 CM
ECHO LA AREA 4C: 21.84 CM2
ECHO LA VOL 2C: 67.1 ML (ref 18–58)
ECHO LA VOL 4C: 68.46 ML (ref 18–58)
ECHO LA VOL BP: 77.57 ML (ref 18–58)
ECHO LA VOL/BSA BIPLANE: 43.22 ML/M2 (ref 16–28)
ECHO LA VOLUME INDEX A2C: 37.39 ML/M2 (ref 16–28)
ECHO LA VOLUME INDEX A4C: 38.14 ML/M2 (ref 16–28)
ECHO LV INTERNAL DIMENSION DIASTOLIC: 4.37 CM (ref 4.2–5.9)
ECHO LV INTERNAL DIMENSION SYSTOLIC: 2.78 CM
ECHO LV IVSD: 1.37 CM (ref 0.6–1)
ECHO LV MASS 2D: 239.1 G (ref 88–224)
ECHO LV MASS INDEX 2D: 133.2 G/M2 (ref 49–115)
ECHO LV POSTERIOR WALL DIASTOLIC: 1.44 CM (ref 0.6–1)
ECHO LVOT DIAM: 2.16 CM
ECHO LVOT PEAK GRADIENT: 3.16 MMHG
ECHO LVOT PEAK VELOCITY: 88.91 CM/S
ECHO LVOT SV: 86.9 ML
ECHO LVOT VTI: 23.82 CM
ECHO MV A VELOCITY: 103.12 CM/S
ECHO MV E DECELERATION TIME (DT): 365.61 MS
ECHO MV E VELOCITY: 95.19 CM/S
ECHO MV E/A RATIO: 0.92
ECHO PVEIN A DURATION: 138.41 MS
ECHO PVEIN A VELOCITY: 30.69 CM/S
ECHO RV INTERNAL DIMENSION: 3.65 CM
ECHO TV REGURGITANT MAX VELOCITY: 324.02 CM/S
ECHO TV REGURGITANT PEAK GRADIENT: 42 MMHG
LVOT MG: 1.92 MMHG

## 2021-03-09 PROCEDURE — 93306 TTE W/DOPPLER COMPLETE: CPT

## 2021-08-10 ENCOUNTER — APPOINTMENT (OUTPATIENT)
Dept: CT IMAGING | Age: 81
DRG: 315 | End: 2021-08-10
Attending: EMERGENCY MEDICINE
Payer: MEDICARE

## 2021-08-10 ENCOUNTER — APPOINTMENT (OUTPATIENT)
Dept: GENERAL RADIOLOGY | Age: 81
DRG: 315 | End: 2021-08-10
Attending: EMERGENCY MEDICINE
Payer: MEDICARE

## 2021-08-10 ENCOUNTER — HOSPITAL ENCOUNTER (INPATIENT)
Age: 81
LOS: 2 days | Discharge: NURSING, REHAB OR LONG-TERM CARE | DRG: 315 | End: 2021-08-13
Attending: EMERGENCY MEDICINE | Admitting: INTERNAL MEDICINE
Payer: MEDICARE

## 2021-08-10 DIAGNOSIS — R29.6 MULTIPLE FALLS: ICD-10-CM

## 2021-08-10 DIAGNOSIS — R62.51 FAILURE TO THRIVE (0-17): ICD-10-CM

## 2021-08-10 DIAGNOSIS — R26.2 AMBULATORY DYSFUNCTION: Primary | ICD-10-CM

## 2021-08-10 PROBLEM — I10 HYPERTENSION: Status: ACTIVE | Noted: 2021-08-10

## 2021-08-10 PROBLEM — E78.00 HYPERCHOLESTEREMIA: Status: ACTIVE | Noted: 2021-08-10

## 2021-08-10 PROBLEM — R55 SYNCOPE: Status: ACTIVE | Noted: 2021-08-10

## 2021-08-10 LAB
ALBUMIN SERPL-MCNC: 2.9 G/DL (ref 3.5–4.7)
ALBUMIN/GLOB SERPL: 1.1 {RATIO}
ALP SERPL-CCNC: 80 U/L (ref 38–126)
ALT SERPL-CCNC: 66 U/L (ref 3–72)
ANION GAP SERPL CALC-SCNC: 6 MMOL/L
APPEARANCE UR: CLEAR
AST SERPL W P-5'-P-CCNC: 70 U/L (ref 17–74)
BACTERIA URNS QL MICRO: NEGATIVE /HPF
BASOPHILS # BLD: 0.1 K/UL (ref 0–0.1)
BASOPHILS NFR BLD: 1 % (ref 0–2)
BILIRUB SERPL-MCNC: 0.6 MG/DL (ref 0.2–1)
BILIRUB UR QL: NEGATIVE
BUN SERPL-MCNC: 40 MG/DL (ref 9–21)
BUN/CREAT SERPL: 40
CA-I BLD-MCNC: 8.1 MG/DL (ref 8.5–10.5)
CHLORIDE SERPL-SCNC: 107 MMOL/L (ref 94–111)
CO2 SERPL-SCNC: 22 MMOL/L (ref 21–33)
COLOR UR: YELLOW
CREAT SERPL-MCNC: 1 MG/DL (ref 0.8–1.5)
EOSINOPHIL # BLD: 0 K/UL (ref 0–0.4)
EOSINOPHIL NFR BLD: 0 % (ref 0–5)
ERYTHROCYTE [DISTWIDTH] IN BLOOD BY AUTOMATED COUNT: 14 % (ref 11.6–14.5)
GLOBULIN SER CALC-MCNC: 2.6 G/DL
GLUCOSE BLD STRIP.AUTO-MCNC: 72 MG/DL (ref 70–110)
GLUCOSE SERPL-MCNC: 138 MG/DL (ref 70–110)
GLUCOSE UR STRIP.AUTO-MCNC: NEGATIVE MG/DL
HCT VFR BLD AUTO: 26.9 % (ref 36–48)
HGB BLD-MCNC: 9.4 G/DL (ref 13–16)
HGB UR QL STRIP: ABNORMAL
IMM GRANULOCYTES # BLD AUTO: 0 K/UL
IMM GRANULOCYTES NFR BLD AUTO: 0 %
KETONES UR QL STRIP.AUTO: NEGATIVE MG/DL
LEUKOCYTE ESTERASE UR QL STRIP.AUTO: NEGATIVE
LYMPHOCYTES # BLD: 0.2 K/UL (ref 0.9–3.6)
LYMPHOCYTES NFR BLD: 3 % (ref 21–52)
MCH RBC QN AUTO: 30 PG (ref 24–34)
MCHC RBC AUTO-ENTMCNC: 34.9 G/DL (ref 31–37)
MCV RBC AUTO: 85.9 FL (ref 74–97)
MONOCYTES # BLD: 0.5 K/UL (ref 0.05–1.2)
MONOCYTES NFR BLD: 7 % (ref 3–10)
NEUTS SEG # BLD: 7 K/UL (ref 1.8–8)
NEUTS SEG NFR BLD: 89 % (ref 40–73)
NITRITE UR QL STRIP.AUTO: NEGATIVE
PERFORMED BY, TECHID: NORMAL
PH UR STRIP: 6 [PH] (ref 5–8)
PLATELET # BLD AUTO: 325 K/UL (ref 135–420)
PLATELET COMMENTS,PCOM: ADEQUATE
PMV BLD AUTO: 11.4 FL (ref 9.2–11.8)
POTASSIUM SERPL-SCNC: 4.3 MMOL/L (ref 3.2–5.1)
PROT SERPL-MCNC: 5.5 G/DL (ref 6.1–8.4)
PROT UR STRIP-MCNC: 100 MG/DL
RBC # BLD AUTO: 3.13 M/UL (ref 4.7–5.5)
RBC #/AREA URNS HPF: ABNORMAL /HPF (ref 0–2)
RBC MORPH BLD: ABNORMAL
SODIUM SERPL-SCNC: 135 MMOL/L (ref 135–145)
SP GR UR REFRACTOMETRY: 1.01 (ref 1–1.03)
TROPONIN I SERPL-MCNC: 0.05 NG/ML (ref 0.02–0.05)
UROBILINOGEN UR QL STRIP.AUTO: 1 EU/DL (ref 0.2–1)
WBC # BLD AUTO: 7.8 K/UL (ref 4.6–13.2)
WBC URNS QL MICRO: ABNORMAL /HPF (ref 0–4)

## 2021-08-10 PROCEDURE — 74011250636 HC RX REV CODE- 250/636: Performed by: EMERGENCY MEDICINE

## 2021-08-10 PROCEDURE — 93005 ELECTROCARDIOGRAM TRACING: CPT

## 2021-08-10 PROCEDURE — 74011250636 HC RX REV CODE- 250/636: Performed by: NURSE PRACTITIONER

## 2021-08-10 PROCEDURE — 85025 COMPLETE CBC W/AUTO DIFF WBC: CPT

## 2021-08-10 PROCEDURE — 99218 HC RM OBSERVATION: CPT

## 2021-08-10 PROCEDURE — 82962 GLUCOSE BLOOD TEST: CPT

## 2021-08-10 PROCEDURE — 71260 CT THORAX DX C+: CPT

## 2021-08-10 PROCEDURE — 73564 X-RAY EXAM KNEE 4 OR MORE: CPT

## 2021-08-10 PROCEDURE — 74011000636 HC RX REV CODE- 636: Performed by: EMERGENCY MEDICINE

## 2021-08-10 PROCEDURE — 71045 X-RAY EXAM CHEST 1 VIEW: CPT

## 2021-08-10 PROCEDURE — 81001 URINALYSIS AUTO W/SCOPE: CPT

## 2021-08-10 PROCEDURE — 99285 EMERGENCY DEPT VISIT HI MDM: CPT

## 2021-08-10 PROCEDURE — 72170 X-RAY EXAM OF PELVIS: CPT

## 2021-08-10 PROCEDURE — 80053 COMPREHEN METABOLIC PANEL: CPT

## 2021-08-10 PROCEDURE — 74011250637 HC RX REV CODE- 250/637: Performed by: NURSE PRACTITIONER

## 2021-08-10 PROCEDURE — 70450 CT HEAD/BRAIN W/O DYE: CPT

## 2021-08-10 PROCEDURE — 84484 ASSAY OF TROPONIN QUANT: CPT

## 2021-08-10 RX ORDER — ENOXAPARIN SODIUM 100 MG/ML
40 INJECTION SUBCUTANEOUS DAILY
Status: DISCONTINUED | OUTPATIENT
Start: 2021-08-11 | End: 2021-08-11

## 2021-08-10 RX ORDER — POLYETHYLENE GLYCOL 3350 17 G/17G
17 POWDER, FOR SOLUTION ORAL DAILY PRN
Status: DISCONTINUED | OUTPATIENT
Start: 2021-08-10 | End: 2021-08-13 | Stop reason: HOSPADM

## 2021-08-10 RX ORDER — ACETAMINOPHEN 325 MG/1
650 TABLET ORAL
Status: DISCONTINUED | OUTPATIENT
Start: 2021-08-10 | End: 2021-08-13 | Stop reason: HOSPADM

## 2021-08-10 RX ORDER — CEPHALEXIN 500 MG/1
CAPSULE ORAL
COMMUNITY
Start: 2021-08-04 | End: 2021-08-13

## 2021-08-10 RX ORDER — ONDANSETRON 4 MG/1
4 TABLET, ORALLY DISINTEGRATING ORAL
Status: DISCONTINUED | OUTPATIENT
Start: 2021-08-10 | End: 2021-08-13 | Stop reason: HOSPADM

## 2021-08-10 RX ORDER — CHLORTHALIDONE 50 MG/1
TABLET ORAL
COMMUNITY
Start: 2021-07-02 | End: 2021-08-19 | Stop reason: SDUPTHER

## 2021-08-10 RX ORDER — LOSARTAN POTASSIUM 100 MG/1
TABLET ORAL
COMMUNITY
Start: 2021-05-05 | End: 2021-08-19 | Stop reason: SDUPTHER

## 2021-08-10 RX ORDER — ACETAMINOPHEN 650 MG/1
650 SUPPOSITORY RECTAL
Status: DISCONTINUED | OUTPATIENT
Start: 2021-08-10 | End: 2021-08-13 | Stop reason: HOSPADM

## 2021-08-10 RX ORDER — SODIUM CHLORIDE 0.9 % (FLUSH) 0.9 %
5-40 SYRINGE (ML) INJECTION EVERY 8 HOURS
Status: DISCONTINUED | OUTPATIENT
Start: 2021-08-10 | End: 2021-08-13 | Stop reason: HOSPADM

## 2021-08-10 RX ORDER — ATORVASTATIN CALCIUM 10 MG/1
20 TABLET, FILM COATED ORAL
Status: DISCONTINUED | OUTPATIENT
Start: 2021-08-10 | End: 2021-08-13 | Stop reason: HOSPADM

## 2021-08-10 RX ORDER — HYDRALAZINE HYDROCHLORIDE 50 MG/1
TABLET, FILM COATED ORAL
COMMUNITY
Start: 2021-07-02 | End: 2021-08-13

## 2021-08-10 RX ORDER — SODIUM CHLORIDE 9 MG/ML
50 INJECTION, SOLUTION INTRAVENOUS CONTINUOUS
Status: DISCONTINUED | OUTPATIENT
Start: 2021-08-10 | End: 2021-08-12

## 2021-08-10 RX ORDER — ONDANSETRON 2 MG/ML
4 INJECTION INTRAMUSCULAR; INTRAVENOUS
Status: DISCONTINUED | OUTPATIENT
Start: 2021-08-10 | End: 2021-08-13 | Stop reason: HOSPADM

## 2021-08-10 RX ORDER — HYDRALAZINE HYDROCHLORIDE 25 MG/1
50 TABLET, FILM COATED ORAL 3 TIMES DAILY
Status: DISCONTINUED | OUTPATIENT
Start: 2021-08-11 | End: 2021-08-11

## 2021-08-10 RX ORDER — SODIUM CHLORIDE 0.9 % (FLUSH) 0.9 %
5-40 SYRINGE (ML) INJECTION AS NEEDED
Status: DISCONTINUED | OUTPATIENT
Start: 2021-08-10 | End: 2021-08-13 | Stop reason: HOSPADM

## 2021-08-10 RX ADMIN — Medication 10 ML: at 23:58

## 2021-08-10 RX ADMIN — SODIUM CHLORIDE 50 ML/HR: 9 INJECTION, SOLUTION INTRAVENOUS at 23:58

## 2021-08-10 RX ADMIN — ATORVASTATIN CALCIUM 20 MG: 10 TABLET, FILM COATED ORAL at 23:58

## 2021-08-10 RX ADMIN — SODIUM CHLORIDE 1000 ML: 9 INJECTION, SOLUTION INTRAVENOUS at 14:21

## 2021-08-10 RX ADMIN — IOPAMIDOL 100 ML: 755 INJECTION, SOLUTION INTRAVENOUS at 20:00

## 2021-08-10 NOTE — ED PROVIDER NOTES
EMERGENCY DEPARTMENT HISTORY AND PHYSICAL EXAM      Date: 8/10/2021  Patient Name: Akosua Burgess      History of Presenting Illness     Chief Complaint   Patient presents with    Fall       History Provided By: Patient, Patient's Wife and EMS    HPI: Akosua Burgess, [de-identified] y.o. male with a past medical history significant Diabetes, depression, hypertension,  presents to the ED, brought in by EMS were called at scene for patient having fallen. EMS report were called by wife who described patient falling while attempting to walk inside the house using his walker. Wife reported patient just went down but she was able to help him get to the ground. When she attempted to get him to stand he could not stand. EMS also reports they attempted to get patient to walk but he could not stand. There is some question of dementia and patient and he is a very poor historian. He is simply denies any pain. He has no headache, neck chest or back pain. He does have some pain right knee but it does not appear to be from this fall. Patient has had an abnormal gait for a long time. Wife also reports frequent falls. She also reports she is having difficulty caring for patient at home and wants some help. She states she does not want patient to nursing home. There are no other complaints, changes, or physical findings at this time.     PCP: Becca Hernandez MD    Current Facility-Administered Medications   Medication Dose Route Frequency Provider Last Rate Last Admin    sodium chloride (NS) flush 5-40 mL  5-40 mL IntraVENous Q8H Graciela Lundberg ACNP        sodium chloride (NS) flush 5-40 mL  5-40 mL IntraVENous PRN Dory Lundberg ACNP        acetaminophen (TYLENOL) tablet 650 mg  650 mg Oral Q6H PRN Dory Lundberg ACNP        Or    acetaminophen (TYLENOL) suppository 650 mg  650 mg Rectal Q6H PRN Dory Lundberg ACNP        polyethylene glycol (MIRALAX) packet 17 g  17 g Oral DAILY PRN VENANCIO Garcia        ondansetron (ZOFRAN ODT) tablet 4 mg  4 mg Oral Q8H PRN VENANCIO Garcia        Or    ondansetron (ZOFRAN) injection 4 mg  4 mg IntraVENous Q6H PRN Britt Lundberg ACNP        [START ON 8/11/2021] enoxaparin (LOVENOX) injection 40 mg  40 mg SubCUTAneous DAILY Britt Lundberg ACNP         Current Outpatient Medications   Medication Sig Dispense Refill    hydrALAZINE (APRESOLINE) 50 mg tablet TAKE 1 TABLET BY MOUTH THREE TIMES DAILY      simvastatin (ZOCOR) 20 mg tablet Take  by mouth nightly.       losartan (COZAAR) 100 mg tablet TAKE 1 TABLET BY MOUTH EVERY DAY      cephALEXin (KEFLEX) 500 mg capsule TAKE ONE CAPSULE BY MOUTH TWICE DAILY X 7 DAYS      chlorthalidone (HYGROTEN) 50 mg tablet TAKE 1 TABLET BY MOUTH EVERY DAY IN THE MORNING WITH FOOD         Past History     Past Medical History:  Past Medical History:   Diagnosis Date    Abnormality of gait     Benign neoplasm of colon     Depression     DM [facility]di[facility] mellitus) ([facility] [address]     Headache     Hypertension     Nonspecific (abnormal) findings on radiological and other examination of genitourinary organs     Other and unspecified hyperlipidemia     Personal history of fall     Right bundle branch block and left anterior fascicular block        Past Surgical History:  Past Surgical History:   Procedure Laterality Date    COLONOSCOPY N/A 3/5/2018    COLONOSCOPY w/ biopsies w/ polypectomy performed by Alaina Erazo [facility] at [facility]COPY    HX CATARACT REMOVAL      HX CHOLECYSTECTOMY      HX COLONOSCOPY  3/12/2015    HX TONSILLECTOMY         Family History:  Family History   Problem Relation Age of Onset    Diabetes Mother     Alzheimer Mother     Cancer Father         Lung Cancer       Social History:  Social History     Tobacco Use    Smoking status: Former Smoker     Types: Cigarettes    Smokeless tobacco: Never Used    Tobacco comment: Quit 40 years ago    Substance Use Topics    Alcohol use: Not Currently     Alcohol/week: 0.0 standard drinks    Drug use: No       Allergies: Allergies   Allergen Reactions    Oxycontin [Oxycodone] Other (comments)     hallucinations    Vicodin [Hydrocodone-Acetaminophen] Other (comments)         Review of Systems     Review of Systems   Unable to perform ROS: Dementia       Physical Exam     Physical Exam  Vitals and nursing note reviewed. Constitutional:       General: He is not in acute distress. Appearance: He is well-developed. He is not diaphoretic. Comments: Cachectic elderly male who appears in no acute distress   HENT:      Head: Normocephalic and atraumatic. No right periorbital erythema or left periorbital erythema. Jaw: No trismus. Right Ear: External ear normal. No drainage or swelling. Tympanic membrane is not perforated, erythematous or bulging. Left Ear: External ear normal. No drainage or swelling. Tympanic membrane is not perforated, erythematous or bulging. Nose: Nose normal. No mucosal edema or rhinorrhea. Right Sinus: No maxillary sinus tenderness or frontal sinus tenderness. Left Sinus: No maxillary sinus tenderness or frontal sinus tenderness. Mouth/Throat:      Mouth: No oral lesions. Dentition: No dental abscesses. Pharynx: Uvula midline. No oropharyngeal exudate, posterior oropharyngeal erythema or uvula swelling. Tonsils: No tonsillar abscesses. Eyes:      General: No scleral icterus. Right eye: No discharge. Left eye: No discharge. Conjunctiva/sclera: Conjunctivae normal.   Cardiovascular:      Rate and Rhythm: Normal rate and regular rhythm. Heart sounds: Normal heart sounds. No murmur heard. No friction rub. No gallop. Pulmonary:      Effort: Pulmonary effort is normal. No tachypnea, accessory muscle usage or respiratory distress. Breath sounds: Normal breath sounds. No decreased breath sounds, wheezing, rhonchi or rales. Abdominal:      General: Bowel sounds are normal. There is no distension. Palpations: Abdomen is soft. Tenderness: There is no abdominal tenderness. Musculoskeletal:         General: No tenderness. Normal range of motion. Cervical back: Normal range of motion and neck supple. Comments: There is easy bruising to all extremities with some old bruises and also newer ones. Small bruise right knee which appears new and there is a mild diffuse tenderness of the knee, patient moans with manipulation of the right knee. He is however able to raise each leg against gravity. Lymphadenopathy:      Cervical: No cervical adenopathy. Skin:     General: Skin is warm and dry. Neurological:      General: No focal deficit present. Mental Status: He is alert and oriented to person, place, and time. Cranial Nerves: No cranial nerve deficit.    Psychiatric:         Judgment: Judgment normal.         Lab and Diagnostic Study Results     Labs -     Recent Results (from the past 12 hour(s))   EKG, 12 LEAD, INITIAL    Collection Time: 08/10/21  2:08 PM   Result Value Ref Range    Ventricular Rate 64 BPM    Atrial Rate 64 BPM    P-R Interval 188 ms    QRS Duration 143 ms    Q-T Interval 463 ms    QTC Calculation (Bezet) 478 ms    Calculated P Axis -71 degrees    Calculated R Axis -41 degrees    Calculated T Axis -9 degrees    Diagnosis       Sinus or ectopic atrial rhythm  RBBB and LAFB  Left ventricular hypertrophy     CBC WITH AUTOMATED DIFF    Collection Time: 08/10/21  2:24 PM   Result Value Ref Range    WBC 7.8 4.6 - 13.2 K/uL    RBC 3.13 (L) 4.70 - 5.50 M/uL    HGB 9.4 (L) 13.0 - 16.0 g/dL    HCT 26.9 (L) 36.0 - 48.0 %    MCV 85.9 74.0 - 97.0 FL    MCH 30.0 24.0 - 34.0 PG    MCHC 34.9 31.0 - 37.0 g/dL    RDW 14.0 11.6 - 14.5 %    PLATELET 991 198 - 585 K/uL    MPV 11.4 9.2 - 11.8 FL    NEUTROPHILS 89 (H) 40 - 73 %    LYMPHOCYTES 3 (L) 21 - 52 %    MONOCYTES 7 3 - 10 %    EOSINOPHILS 0 0 - 5 % BASOPHILS 1 0 - 2 %    IMMATURE GRANULOCYTES 0 %    ABS. NEUTROPHILS 7.0 1.8 - 8.0 K/UL    ABS. LYMPHOCYTES 0.2 (L) 0.9 - 3.6 K/UL    ABS. MONOCYTES 0.5 0.05 - 1.2 K/UL    ABS. EOSINOPHILS 0.0 0.0 - 0.4 K/UL    ABS. BASOPHILS 0.1 0.0 - 0.1 K/UL    ABS. IMM. GRANS. 0.0 K/UL    PLATELET COMMENTS Adequate      RBC COMMENTS Normocytic, Normochromic     METABOLIC PANEL, COMPREHENSIVE    Collection Time: 08/10/21  2:24 PM   Result Value Ref Range    Sodium 135 135 - 145 mmol/L    Potassium 4.3 3.2 - 5.1 mmol/L    Chloride 107 94 - 111 mmol/L    CO2 22 21 - 33 mmol/L    Anion gap 6 mmol/L    Glucose 138 (H) 70 - 110 mg/dL    BUN 40 (H) 9 - 21 mg/dL    Creatinine 1.00 0.8 - 1.50 mg/dL    BUN/Creatinine ratio 40      GFR est AA >60 ml/min/1.73m2    GFR est non-AA >60 ml/min/1.73m2    Calcium 8.1 (L) 8.5 - 10.5 mg/dL    Bilirubin, total 0.6 0.2 - 1.0 mg/dL    AST (SGOT) 70 17 - 74 U/L    ALT (SGPT) 66 3 - 72 U/L    Alk.  phosphatase 80 38 - 126 U/L    Protein, total 5.5 (L) 6.1 - 8.4 g/dL    Albumin 2.9 (L) 3.5 - 4.7 g/dL    Globulin 2.6 g/dL    A-G Ratio 1.1     TROPONIN I    Collection Time: 08/10/21  2:24 PM   Result Value Ref Range    Troponin-I, Qt. 0.05 0.02 - 0.05 ng/mL   URINALYSIS W/ RFLX MICROSCOPIC    Collection Time: 08/10/21  5:55 PM   Result Value Ref Range    Color Yellow      Appearance Clear      Specific gravity 1.007 1.005 - 1.030      pH (UA) 6.0 5.0 - 8.0      Protein 100 (A) Negative mg/dL    Glucose Negative Negative mg/dL    Ketone Negative Negative mg/dL    Bilirubin Negative Negative      Blood Trace (A) Negative      Urobilinogen 1.0 0.2 - 1.0 EU/dL    Nitrites Negative Negative      Leukocyte Esterase Negative Negative     URINE MICROSCOPIC    Collection Time: 08/10/21  5:55 PM   Result Value Ref Range    WBC 0-4 0 - 4 /hpf    RBC 0-5 0 - 2 /hpf    Bacteria Negative (A) None /hpf       Radiologic Studies -   [unfilled]  CT Results  (Last 48 hours)               08/10/21 2016  CT CHEST W CONT Final result    Impression:      1. No evidence of retrocardiac or chest mass. Finding on chest x-ray likely   reflect atelectasis. 2. Mild dependent bibasilar pulmonary atelectasis with tiny right pleural   effusion. 3. Atherosclerotic disease. Narrative:  EXAM: CT chest.       INDICATION: Retrocardiac mass seen on plain radiograph. COMPARISON: Plain radiograph, earlier the same day. Chest CT 5/29/2016       TECHNIQUE: Axial CT imaging from the thoracic inlet through the diaphragm   following nonionic intravenous contrast. Multiplanar reformats were generated. One or more dose reduction techniques were used on this CT: automated exposure   control, adjustment of the mAs and/or kVp according to patient size, and   iterative reconstruction techniques. The specific techniques used on this CT   exam have been documented in the patient's electronic medical record.       _______________       FINDINGS:       LUNGS: Mild dependent bibasilar subsegmental atelectasis. No evidence of   consolidation or lung mass. AIRWAY: Unremarkable. PLEURAL SPACES: Tiny right pleural effusion. MEDIASTINUM: Mild to moderate cardiomegaly. No pericardial effusion. Mild to   moderate atherosclerotic aortic calcification. LYMPH NODES: No enlarged lymph nodes. UPPER ABDOMEN: Cholecystectomy. Hepatic steatosis. OTHER: No fracture or suspicious bone lesion. _______________           08/10/21 1613  CT HEAD WO CONT Final result    Impression:      No acute intracranial abnormality. Narrative:  EXAM: CT head       INDICATION: Headache. COMPARISON: 1/23/2015       TECHNIQUE: Axial CT imaging of the head was performed without intravenous   contrast. Standard multiplanar coronal and sagittal reformatted images were   obtained and are included in interpretation.        One or more dose reduction techniques were used on this CT: automated exposure   control, adjustment of the mAs and/or kVp according to patient size, and   iterative reconstruction techniques. The specific techniques used on this CT   exam have been documented in the patient's electronic medical record. Digital   Imaging and Communications in Medicine (DICOM) format image data are available   to nonaffiliated external healthcare facilities or entities on a secure, media   free, reciprocally searchable basis with patient authorization for at least a   12-month period after this study. _______________       FINDINGS:       BRAIN AND POSTERIOR FOSSA: Patchy periventricular, deep and subcortical white   matter hypoattenuation which is nonspecific but likely represents chronic   ischemic changes. No evidence of acute large vessel transcortical infarct or   acute parenchymal hemorrhage. No midline shift or hydrocephalus. Right basilar   lacunar infarct. EXTRA-AXIAL SPACES AND MENINGES: There are no abnormal extra-axial fluid   collections. CALVARIUM: Intact. SINUSES: Clear. OTHER: None.       _______________               CXR Results  (Last 48 hours)               08/10/21 1451  XR CHEST PORT Final result    Impression:      Cardiomegaly. Retrocardiac density. Narrative:  EXAM: XR CHEST PORT       CLINICAL INDICATION/HISTORY: Fall   -Additional: None       COMPARISON: 5/29/2016       TECHNIQUE: Portable frontal view of the chest       _______________       FINDINGS:       SUPPORT DEVICES: None. HEART AND MEDIASTINUM: Cardiomegaly. LUNGS AND PLEURAL SPACES: Retrocardiac density. No pneumothorax.       _______________                 Medical Decision Making and ED Course   - I am the first and primary provider for this patient AND AM THE PRIMARY PROVIDER OF RECORD. - I reviewed the vital signs, available nursing notes, past medical history, past surgical history, family history and social history. - Initial assessment performed.  The patients presenting problems have been discussed, and the staff are in agreement with the care plan formulated and outlined with them. I have encouraged them to ask questions as they arise throughout their visit. Vital Signs-Reviewed the patient's vital signs. Patient Vitals for the past 12 hrs:   Temp Pulse Resp BP SpO2   08/10/21 1935  71 18 138/63 98 %   08/10/21 1825  77 18 (!) 174/82 98 %   08/10/21 1706  71 18 137/64 97 %   08/10/21 1645  70 20 (!) 154/63 97 %   08/10/21 1625  68 22 (!) 166/58 96 %   08/10/21 1525  70 18 (!) 106/56 97 %   08/10/21 1505  64 16 (!) 119/44 97 %   08/10/21 1445  64 16 (!) 105/40 96 %   08/10/21 1417 97.7 °F (36.5 °C) 64 16 (!) 85/35 94 %       EKG interpretation: (Preliminary): Performed at 14:08, and read at 14:08  Rhythm: normal sinus rhythm; and regular . Rate (approx.): 64; Axis: normal; VA interval: normal; QRS interval: normal ; ST/T wave: non-specific changes; Other findings: .      Records Reviewed: Nursing Notes, Old Medical Records, Previous Radiology Studies and Previous Laboratory Studies    The patient presents with fall and inability to stand with a differential diagnosis of head injury, pelvic fracture, knee fracture, failure to thrive, medication reaction, electrolyte abnormalities, UTI, pneumonia    ED Course:     Elderly patient with a history of multiple falls, questionable dementia, questionable fall today or weakness followed with inability to ambulate. Exam with no focal neurological findings. Unable to get patient to  ED. Evaluation reveals a questionable mass retrocardiac on chest x-ray. During evaluation, wife complains she is unable to care for patient by herself at home. She does not want to be in nursing home but is asking for assistance. She is willing to have patient admitted for any further evaluation but would prefer patient be discharged home with her with some help. Case management was obtained and will follow up .   Patient's wife is also fairly debilitated, walks with walker. She is unable to take patient home. Provider Notes (Medical Decision Making):               Consultations:       Consultations:         Procedures and Critical Care                   I  Disposition     Disposition: Condition stable  Admitted to Medical the case was discussed with the admitting physician, hospitalist Amee Fisher NP attending, Perez Erazo    Diagnosis:   1. Ambulatory dysfunction    2. Failure to thrive (0-17)    3. Multiple falls                                                    2  Diagnosis     Clinical Impression:   1. Ambulatory dysfunction    2. Failure to thrive (0-17)    3. Multiple falls        Attestations:    Martine Lora MD    Please note that this dictation was completed with [employer], the computer voice recognition software. Quite often unanticipated grammatical, syntax, homophones, and other interpretive errors are inadvertently transcribed by the computer software. Please disregard these errors. Please excuse any errors that have escaped final proofreading. Thank you.

## 2021-08-10 NOTE — ED NOTES
Bedside shift change report given to silverio (oncoming nurse) by Meghan Mcfadden (offgoing nurse). Report included the following information SBAR.

## 2021-08-10 NOTE — PROGRESS NOTES
ER called and spoke with MD. MD states if he fines a reason to admit this pt, the pt will need some help at home.

## 2021-08-10 NOTE — ED TRIAGE NOTES
EMS called to residence for pt that had fallen outside while trying to walk inside.  Pt has no c/o  Of pain but unable to bear weight to stand per EMS

## 2021-08-10 NOTE — ED NOTES
Wife states to MD and staff that she wants help at home and does not want pt to be admitted.  MD requests call to on call CM

## 2021-08-11 ENCOUNTER — APPOINTMENT (OUTPATIENT)
Dept: VASCULAR SURGERY | Age: 81
DRG: 315 | End: 2021-08-11
Attending: INTERNAL MEDICINE
Payer: MEDICARE

## 2021-08-11 PROBLEM — R77.8 TROPONIN I ABOVE REFERENCE RANGE: Status: ACTIVE | Noted: 2021-08-11

## 2021-08-11 LAB
ANION GAP SERPL CALC-SCNC: 6 MMOL/L
APTT PPP: 40.8 SEC (ref 23–36.4)
ATRIAL RATE: 64 BPM
ATRIAL RATE: 64 BPM
BUN SERPL-MCNC: 33 MG/DL (ref 9–21)
BUN/CREAT SERPL: 33
CA-I BLD-MCNC: 8.6 MG/DL (ref 8.5–10.5)
CALCULATED P AXIS, ECG09: -71 DEGREES
CALCULATED P AXIS, ECG09: -72 DEGREES
CALCULATED R AXIS, ECG10: -41 DEGREES
CALCULATED R AXIS, ECG10: -47 DEGREES
CALCULATED T AXIS, ECG11: -9 DEGREES
CALCULATED T AXIS, ECG11: 1 DEGREES
CHLORIDE SERPL-SCNC: 108 MMOL/L (ref 94–111)
CO2 SERPL-SCNC: 24 MMOL/L (ref 21–33)
CREAT SERPL-MCNC: 1 MG/DL (ref 0.8–1.5)
DIAGNOSIS, 93000: NORMAL
DIAGNOSIS, 93000: NORMAL
ERYTHROCYTE [DISTWIDTH] IN BLOOD BY AUTOMATED COUNT: 14.2 % (ref 11.6–14.5)
GLUCOSE BLD STRIP.AUTO-MCNC: 107 MG/DL (ref 70–110)
GLUCOSE BLD STRIP.AUTO-MCNC: 108 MG/DL (ref 70–110)
GLUCOSE BLD STRIP.AUTO-MCNC: 128 MG/DL (ref 70–110)
GLUCOSE BLD STRIP.AUTO-MCNC: 167 MG/DL (ref 70–110)
GLUCOSE SERPL-MCNC: 83 MG/DL (ref 70–110)
HCT VFR BLD AUTO: 28.9 % (ref 36–48)
HGB BLD-MCNC: 10.1 G/DL (ref 13–16)
MAGNESIUM SERPL-MCNC: 2.3 MG/DL (ref 1.7–2.8)
MCH RBC QN AUTO: 29.9 PG (ref 24–34)
MCHC RBC AUTO-ENTMCNC: 34.9 G/DL (ref 31–37)
MCV RBC AUTO: 85.5 FL (ref 74–97)
P-R INTERVAL, ECG05: 188 MS
P-R INTERVAL, ECG05: 201 MS
PERFORMED BY, TECHID: ABNORMAL
PERFORMED BY, TECHID: ABNORMAL
PERFORMED BY, TECHID: NORMAL
PERFORMED BY, TECHID: NORMAL
PLATELET # BLD AUTO: 329 K/UL (ref 135–420)
PMV BLD AUTO: 11.6 FL (ref 9.2–11.8)
POTASSIUM SERPL-SCNC: 4.4 MMOL/L (ref 3.2–5.1)
Q-T INTERVAL, ECG07: 450 MS
Q-T INTERVAL, ECG07: 463 MS
QRS DURATION, ECG06: 143 MS
QRS DURATION, ECG06: 146 MS
QTC CALCULATION (BEZET), ECG08: 465 MS
QTC CALCULATION (BEZET), ECG08: 478 MS
RBC # BLD AUTO: 3.38 M/UL (ref 4.7–5.5)
SODIUM SERPL-SCNC: 138 MMOL/L (ref 135–145)
THERAPEUTIC RANGE,PTTT: ABNORMAL SEC (ref 82–109)
TROPONIN I SERPL-MCNC: 0.11 NG/ML (ref 0.02–0.05)
TROPONIN I SERPL-MCNC: 0.13 NG/ML (ref 0.02–0.05)
TROPONIN I SERPL-MCNC: 0.14 NG/ML (ref 0.02–0.05)
TROPONIN I SERPL-MCNC: 0.14 NG/ML (ref 0.02–0.05)
VENTRICULAR RATE, ECG03: 64 BPM
VENTRICULAR RATE, ECG03: 64 BPM
WBC # BLD AUTO: 10.3 K/UL (ref 4.6–13.2)

## 2021-08-11 PROCEDURE — 85730 THROMBOPLASTIN TIME PARTIAL: CPT

## 2021-08-11 PROCEDURE — 36415 COLL VENOUS BLD VENIPUNCTURE: CPT

## 2021-08-11 PROCEDURE — 99218 HC RM OBSERVATION: CPT

## 2021-08-11 PROCEDURE — 74011250637 HC RX REV CODE- 250/637: Performed by: NURSE PRACTITIONER

## 2021-08-11 PROCEDURE — 97166 OT EVAL MOD COMPLEX 45 MIN: CPT

## 2021-08-11 PROCEDURE — 93005 ELECTROCARDIOGRAM TRACING: CPT

## 2021-08-11 PROCEDURE — 84484 ASSAY OF TROPONIN QUANT: CPT

## 2021-08-11 PROCEDURE — 65270000029 HC RM PRIVATE

## 2021-08-11 PROCEDURE — 96372 THER/PROPH/DIAG INJ SC/IM: CPT

## 2021-08-11 PROCEDURE — 97165 OT EVAL LOW COMPLEX 30 MIN: CPT

## 2021-08-11 PROCEDURE — 80048 BASIC METABOLIC PNL TOTAL CA: CPT

## 2021-08-11 PROCEDURE — 93970 EXTREMITY STUDY: CPT

## 2021-08-11 PROCEDURE — 85027 COMPLETE CBC AUTOMATED: CPT

## 2021-08-11 PROCEDURE — 97161 PT EVAL LOW COMPLEX 20 MIN: CPT

## 2021-08-11 PROCEDURE — 74011250636 HC RX REV CODE- 250/636: Performed by: NURSE PRACTITIONER

## 2021-08-11 PROCEDURE — 83735 ASSAY OF MAGNESIUM: CPT

## 2021-08-11 PROCEDURE — 82962 GLUCOSE BLOOD TEST: CPT

## 2021-08-11 RX ORDER — AMLODIPINE BESYLATE 5 MG/1
5 TABLET ORAL DAILY
Status: DISCONTINUED | OUTPATIENT
Start: 2021-08-12 | End: 2021-08-12

## 2021-08-11 RX ORDER — HEPARIN SODIUM 10000 [USP'U]/100ML
18-36 INJECTION, SOLUTION INTRAVENOUS
Status: DISCONTINUED | OUTPATIENT
Start: 2021-08-11 | End: 2021-08-12 | Stop reason: ALTCHOICE

## 2021-08-11 RX ORDER — HEPARIN SODIUM 5000 [USP'U]/ML
80 INJECTION, SOLUTION INTRAVENOUS; SUBCUTANEOUS ONCE
Status: COMPLETED | OUTPATIENT
Start: 2021-08-11 | End: 2021-08-11

## 2021-08-11 RX ADMIN — HYDRALAZINE HYDROCHLORIDE 50 MG: 25 TABLET, FILM COATED ORAL at 08:34

## 2021-08-11 RX ADMIN — Medication 10 ML: at 14:23

## 2021-08-11 RX ADMIN — ENOXAPARIN SODIUM 40 MG: 40 INJECTION SUBCUTANEOUS at 08:34

## 2021-08-11 RX ADMIN — ACETAMINOPHEN 650 MG: 325 TABLET ORAL at 10:39

## 2021-08-11 RX ADMIN — HEPARIN SODIUM AND DEXTROSE 18 UNITS/KG/HR: 10000; 5 INJECTION INTRAVENOUS at 19:15

## 2021-08-11 RX ADMIN — HEPARIN SODIUM 4650 UNITS: 5000 INJECTION INTRAVENOUS; SUBCUTANEOUS at 19:15

## 2021-08-11 RX ADMIN — POLYETHYLENE GLYCOL (3350) 17 G: 17 POWDER, FOR SOLUTION ORAL at 10:39

## 2021-08-11 RX ADMIN — ATORVASTATIN CALCIUM 20 MG: 10 TABLET, FILM COATED ORAL at 21:23

## 2021-08-11 RX ADMIN — Medication 10 ML: at 21:26

## 2021-08-11 NOTE — PROGRESS NOTES
OCCUPATIONAL THERAPY EVALUATION    Patient: Raciel Serrano (49 y.o. male)  Date: 8/11/2021  Primary Diagnosis: Ambulatory dysfunction [R26.2]  Failure to thrive (0-17) [R62.51]  Multiple falls [R29.6]  Troponin I above reference range [R77.8]       Precautions:Fall  PLOF:lives at home with wife. Uses walker at times for mobility. Requiring increasing level of A for basic ADLs, per wife     ASSESSMENT :  Based on the objective data described below, the patient presents with declines in ADLs and mobility . Patient will benefit from skilled intervention to address the above impairments. Patient's rehabilitation potential is considered to be Good  Factors which may influence rehabilitation potential include:   []             None noted  [x]             Mental ability/status  []             Medical condition  [x]             Home/family situation and support systems  []             Safety awareness  []             Pain tolerance/management  []             Other:      PLAN :  Recommendations and Planned Interventions:   [x]               Self Care Training                  [x]      Therapeutic Activities  [x]               Functional Mobility Training   []      Cognitive Retraining  [x]               Therapeutic Exercises           [x]      Endurance Activities  []               Balance Training                    []      Neuromuscular Re-Education  []               Visual/Perceptual Training     [x]      Home Safety Training  [x]               Patient Education                   []      Family Training/Education  []               Other (comment):    Frequency/Duration: Patient will be followed by occupational therapy 4 times a week to address goals. Discharge Recommendations: Home Health and [place]  Further Equipment Recommendations for Discharge: rolling walker and N/A     SUBJECTIVE:   Patient stated im ready to get home .     OBJECTIVE DATA SUMMARY:     Past Medical History:   Diagnosis Date  Abnormality of gait     Benign neoplasm of colon     Depression     DM [facility]di[facility] mellitus) ([facility] [address]     Headache     Hypertension     Nonspecific (abnormal) findings on radiological and other examination of genitourinary organs     Other and unspecified hyperlipidemia     Personal history of fall     Right bundle branch block and left anterior fascicular block      Past Surgical History:   Procedure Laterality Date    COLONOSCOPY N/A 3/5/2018    COLONOSCOPY w/ biopsies w/ polypectomy performed by Goran Seaman MD at [facility] ENDOSCOPY    HX CATARACT REMOVAL      HX CHOLECYSTECTOMY      HX COLONOSCOPY  3/12/2015    HX TONSILLECTOMY       Barriers to Learning/Limitations: yes;  cognitive  Compensate with: visual, verbal, tactile, kinesthetic cues/model    Home Situation:   Home Situation  Home Environment: Trailer/mobile home  # Steps to Enter: 6  Rails to Enter: Yes  Hand Rails : Bilateral  Wheelchair Ramp: Yes  One/Two Story Residence: One story  Living Alone: No  Support Systems: Spouse/Significant Other/Partner  Patient Expects to be Discharged to[de-identified] [employer]  Current DME Used/Available at Home: Katie Yudy, straight, Walker, Wheelchair (Wife states w/c is in poor condition)  [x]  Right hand dominant   []  Left hand dominant    Cognitive/Behavioral Status:  Neurologic State: Alert  Orientation Level: Oriented X4  Cognition: Follows commands       Skin: intact  Edema:none noted     Vision/Perceptual:            WFLs                          Coordination: BUE   WFLs             Balance:  Sitting: Intact  Standing: Impaired  Standing - Static: Fair  Standing - Dynamic : Fair;Poor (Limited by pain in feet)    Strength: BUE    Strength: Generally decreased, functional                Tone & Sensation: BUE       Sensation: Intact                      Range of Motion: BUE    AROM: Within functional limits                         Functional Mobility and Transfers for ADLs:  Bed Mobility:     Supine to Sit: Minimum assistance  Sit to Supine: Contact guard assistance  Scooting: Contact guard assistance  Transfers:  Sit to Stand: Minimum assistance  Stand to Sit: Minimum assistance  Stand Pivot Transfers: (Other) comment (Unable to perform due to pain in feet)                              ADL Assessment:     Pt requires limited assist with basic ADLs to complete dressing and toileting                          Pain:  Pain level pre-treatment: 0/10   Pain level post-treatment:0/10   Pain Intervention(s): Medication (see MAR); Rest, Ice, Repositioning  Response to intervention: Nurse notified, See doc flow  Activity Tolerance:   Good     Please refer to the flowsheet for vital signs taken during this treatment. After treatment:   [] Patient left in no apparent distress sitting up in chair  [x] Patient left in no apparent distress in bed  [x] Call bell left within reach  [] Nursing notified  [x] Caregiver present  [] Bed alarm activated    COMMUNICATION/EDUCATION:   [x] Role of Occupational Therapy in the acute care setting  [x] Home safety education was provided and the patient/caregiver indicated understanding. [x] Patient/family have participated as able in goal setting and plan of care. [] Patient/family agree to work toward stated goals and plan of care. [] Patient understands intent and goals of therapy, but is neutral about his/her participation. [] Patient is unable to participate in goal setting and plan of care. Thank you for this referral.  Ginger Kiran MS, OTR/L        [place] Complexity: History: MEDIUM Complexity : Expanded review of history including physical, cognitive and psychosocial  history ; Examination: MEDIUM Complexity : 3-5 performance deficits relating to physical, cognitive , or psychosocial skils that result in activity limitations and / or participation restrictions; Decision Making:MEDIUM Complexity : Patient may present with comorbidities that affect occupational performnce.  Miniml to moderate modification of tasks or assistance (eg, physical or verbal ) with assesment(s) is necessary to enable patient to complete evaluation

## 2021-08-11 NOTE — PROGRESS NOTES
PVL preliminary results back with acute non-occlusive DVT in the right popliteal vein. trop elevated. ?NSTEMI vs demand ischemia. Heparin drip to be started. No chest pain, hypoxia or tachycardia, hence  will hold CTA. Continue to trend trop. monitor vitals.

## 2021-08-11 NOTE — CONSULTS
[facility] CARDIOLOGY  CARDIOLOGY CONSULTATION    REASON FOR CONSULT: Elevated troponin    REQUESTING PROVIDER: Constantine Yun MD    CHIEF COMPLAINT: Fall    HISTORY OF PRESENT ILLNESS:  Tomi Rubin is a [de-identified]y.o. year-old male with past medical history significant for diabetes, HTN, hyperlipidemia, depression, and possible dementia who was seen today for evaluation of elevated troponin. The patient's wife provides some history as the patient is sleeping during most of the visit. The patient presented to the [facility] ER yesterday for evaluation after sustaining a ground level fall. The patient denies any loss of consciousness, but reports feeling lightheaded prior to falling and has been frequently lightheaded over the past several days. He denies any recent chest pain or shortness of breath. Consultation was requested due to elevated troponin of 0.14 this morning. Records from hospital admission course thus far reviewed. Telemetry reviewed. No events overnight. The patient is in sinus rhythm with occasional first degree AV block. INPATIENT MEDICATIONS:  Home medications reviewed.     Current Facility-Administered Medications:     sodium chloride (NS) flush 5-40 mL, 5-40 mL, IntraVENous, Q8H, Toño, Graciela S, ACNP, 10 mL at 08/10/21 2358    sodium chloride (NS) flush 5-40 mL, 5-40 mL, IntraVENous, PRN, Toño, Graciela S, ACNP    acetaminophen (TYLENOL) tablet 650 mg, 650 mg, Oral, Q6H PRN, 650 mg at 08/11/21 1039 **OR** acetaminophen (TYLENOL) suppository 650 mg, 650 mg, Rectal, Q6H PRN, Holland, Graciela S, ACNP    polyethylene glycol (MIRALAX) packet 17 g, 17 g, Oral, DAILY PRN, Toño, Graciela S, ACNP, 17 g at 08/11/21 1039    ondansetron (ZOFRAN ODT) tablet 4 mg, 4 mg, Oral, Q8H PRN **OR** ondansetron (ZOFRAN) injection 4 mg, 4 mg, IntraVENous, Q6H PRN, Toño, Graciela S, ACNP    enoxaparin (LOVENOX) injection 40 mg, 40 mg, SubCUTAneous, DAILY, Holland, Graciela S, ACNP, 40 mg at 08/11/21 0834    0.9% sodium chloride infusion, 50 mL/hr, IntraVENous, CONTINUOUS, Toño, Graciela S, ACNP, Last Rate: 50 mL/hr at 08/10/21 2358, 50 mL/hr at 08/10/21 2358    hydrALAZINE (APRESOLINE) tablet 50 mg, 50 mg, Oral, TID, Toño, Graciela S, ACNP, 50 mg at 08/11/21 0834    atorvastatin (LIPITOR) tablet 20 mg, 20 mg, Oral, QHS, Toño, Graciela S, ACNP, 20 mg at 08/10/21 2358     ALLERGIES:  Allergies reviewed with the patient,  Allergies   Allergen Reactions    Oxycontin [Oxycodone] Other (comments)     hallucinations    Vicodin [Hydrocodone-Acetaminophen] Other (comments)      FAMILY HISTORY:  Family history reviewed. SOCIAL HISTORY:  Notable for former tobacco use, no alcohol use, and no illicit drug use. REVIEW OF SYSTEMS:  Complete review of systems performed, pertinents noted above, all other systems are negative. PHYSICAL EXAMINATION:  Vital sign assessment reveal a blood pressure of 149/65 and pulse rate of 58. Cardiovascular exam has a heart with a regular rate and rhythm, normal S1 and S2. Soft systolic murmur present. There are no rubs or gallops. Good peripheral pulses. No jugular venous distension. Respiratory exam reveals clear lung fields, no rales or rhonchi. Lymphatic exam reveals no edema. Neurologic exam is nonfocal.      Recent labs results and imaging reviewed.   Notable findings include:   Recent Results (from the past 24 hour(s))   EKG, 12 LEAD, INITIAL    Collection Time: 08/10/21  2:08 PM   Result Value Ref Range    Ventricular Rate 64 BPM    Atrial Rate 64 BPM    P-R Interval 188 ms    QRS Duration 143 ms    Q-T Interval 463 ms    QTC Calculation (Bezet) 478 ms    Calculated P Axis -71 degrees    Calculated R Axis -41 degrees    Calculated T Axis -9 degrees    Diagnosis       Sinus or ectopic atrial rhythm  RBBB and LAFB  Left ventricular hypertrophy    Confirmed by Doyne Clamp ([id number]) on 8/11/2021 10:44:45 AM     CBC WITH AUTOMATED DIFF    Collection Time: 08/10/21  2:24 PM   Result Value Ref Range    WBC 7.8 4.6 - 13.2 K/uL    RBC 3.13 (L) 4.70 - 5.50 M/uL    HGB 9.4 (L) 13.0 - 16.0 g/dL    HCT 26.9 (L) 36.0 - 48.0 %    MCV 85.9 74.0 - 97.0 FL    MCH 30.0 24.0 - 34.0 PG    MCHC 34.9 31.0 - 37.0 g/dL    RDW 14.0 11.6 - 14.5 %    PLATELET 750 618 - 944 K/uL    MPV 11.4 9.2 - 11.8 FL    NEUTROPHILS 89 (H) 40 - 73 %    LYMPHOCYTES 3 (L) 21 - 52 %    MONOCYTES 7 3 - 10 %    EOSINOPHILS 0 0 - 5 %    BASOPHILS 1 0 - 2 %    IMMATURE GRANULOCYTES 0 %    ABS. NEUTROPHILS 7.0 1.8 - 8.0 K/UL    ABS. LYMPHOCYTES 0.2 (L) 0.9 - 3.6 K/UL    ABS. MONOCYTES 0.5 0.05 - 1.2 K/UL    ABS. EOSINOPHILS 0.0 0.0 - 0.4 K/UL    ABS. BASOPHILS 0.1 0.0 - 0.1 K/UL    ABS. IMM. GRANS. 0.0 K/UL    PLATELET COMMENTS Adequate      RBC COMMENTS Normocytic, Normochromic     METABOLIC PANEL, COMPREHENSIVE    Collection Time: 08/10/21  2:24 PM   Result Value Ref Range    Sodium 135 135 - 145 mmol/L    Potassium 4.3 3.2 - 5.1 mmol/L    Chloride 107 94 - 111 mmol/L    CO2 22 21 - 33 mmol/L    Anion gap 6 mmol/L    Glucose 138 (H) 70 - 110 mg/dL    BUN 40 (H) 9 - 21 mg/dL    Creatinine 1.00 0.8 - 1.50 mg/dL    BUN/Creatinine ratio 40      GFR est AA >60 ml/min/1.73m2    GFR est non-AA >60 ml/min/1.73m2    Calcium 8.1 (L) 8.5 - 10.5 mg/dL    Bilirubin, total 0.6 0.2 - 1.0 mg/dL    AST (SGOT) 70 17 - 74 U/L    ALT (SGPT) 66 3 - 72 U/L    Alk.  phosphatase 80 38 - 126 U/L    Protein, total 5.5 (L) 6.1 - 8.4 g/dL    Albumin 2.9 (L) 3.5 - 4.7 g/dL    Globulin 2.6 g/dL    A-G Ratio 1.1     TROPONIN I    Collection Time: 08/10/21  2:24 PM   Result Value Ref Range    Troponin-I, Qt. 0.05 0.02 - 0.05 ng/mL   URINALYSIS W/ RFLX MICROSCOPIC    Collection Time: 08/10/21  5:55 PM   Result Value Ref Range    Color Yellow      Appearance Clear      Specific gravity 1.007 1.005 - 1.030      pH (UA) 6.0 5.0 - 8.0      Protein 100 (A) Negative mg/dL    Glucose Negative Negative mg/dL    Ketone Negative Negative mg/dL Bilirubin Negative Negative      Blood Trace (A) Negative      Urobilinogen 1.0 0.2 - 1.0 EU/dL    Nitrites Negative Negative      Leukocyte Esterase Negative Negative     URINE MICROSCOPIC    Collection Time: 08/10/21  5:55 PM   Result Value Ref Range    WBC 0-4 0 - 4 /hpf    RBC 0-5 0 - 2 /hpf    Bacteria Negative (A) None /hpf   GLUCOSE, POC    Collection Time: 08/10/21 11:57 PM   Result Value Ref Range    Glucose (POC) 72 70 - 110 mg/dL    Performed by [address], BASIC    Collection Time: 08/11/21  4:41 AM   Result Value Ref Range    Sodium 138 135 - 145 mmol/L    Potassium 4.4 3.2 - 5.1 mmol/L    Chloride 108 94 - 111 mmol/L    CO2 24 21 - 33 mmol/L    Anion gap 6 mmol/L    Glucose 83 70 - 110 mg/dL    BUN 33 (H) 9 - 21 mg/dL    Creatinine 1.00 0.8 - 1.50 mg/dL    BUN/Creatinine ratio 33      GFR est AA >60 ml/min/1.73m2    GFR est non-AA >60 ml/min/1.73m2    Calcium 8.6 8.5 - 10.5 mg/dL   MAGNESIUM    Collection Time: 08/11/21  4:41 AM   Result Value Ref Range    Magnesium 2.3 1.7 - 2.8 mg/dL   CBC W/O DIFF    Collection Time: 08/11/21  4:41 AM   Result Value Ref Range    WBC 10.3 4.6 - 13.2 K/uL    RBC 3.38 (L) 4.70 - 5.50 M/uL    HGB 10.1 (L) 13.0 - 16.0 g/dL    HCT 28.9 (L) 36.0 - 48.0 %    MCV 85.5 74.0 - 97.0 FL    MCH 29.9 24.0 - 34.0 PG    MCHC 34.9 31.0 - 37.0 g/dL    RDW 14.2 11.6 - 14.5 %    PLATELET 737 790 - 890 K/uL    MPV 11.6 9.2 - 11.8 FL   TROPONIN I    Collection Time: 08/11/21  4:41 AM   Result Value Ref Range    Troponin-I, Qt. 0.14 (H) 0.02 - 0.05 ng/mL   GLUCOSE, POC    Collection Time: 08/11/21  7:09 AM   Result Value Ref Range    Glucose (POC) 108 70 - 110 mg/dL    Performed by [address] [place] [address], POC    Collection Time: 08/11/21 11:04 AM   Result Value Ref Range    Glucose (POC) 128 (H) 70 - 110 mg/dL    Performed by Yamil Rich Student      Discussed case with Dr. Diamond Gueirn, and our impression and recommendations are as follows:  1.  Elevated troponins: First troponin was negative; second troponin is elevated to 0.14 which is non-ACS range. EKG is nonischemic. Troponin elevation is likely due to hypotension on presentation, as he is free of chest pain. Will check an additional troponin to establish trend. Will initiate aspirin therapy. Will defer metoprolol for now due to hypotension on presentation and recent complaints of lightheadedness. The patient had a negative nuclear stress test in 2019. Consider repeating as outpatient. 2. Hypertension: Blood pressure is acceptable this morning, but was 85/35 on presentation with frequent complaints of lightheadedness recently per his wife's report. Orthostatics were negative today. Would recommend against using hydralazine for hypertension management due to its vasodilatory effects which may be contributing to his symptoms. Will switch to amlodipine 5 mg daily; titrate based on response. 3. Hyperlipidemia: Continue statin therapy. 4. Near syncope: Orthostatics are negative today but hypotensive on arrival to the ER. EKGs and telemetry without pauses; first degree AV block noted on telemetry. Chest CTA shows no PE; only tiny right pleural effusion and atelectasis. No aortic stenosis or LVOT obstruction noted on echocardiogram done in March 2021. Symptoms likely due to vasodilation from hydralazine as above; will switch antihypertensive to amlodipine. Consider outpatient event monitoring if symptoms persist.    Thank you for involving us in the care of this patient. Please do not hesitate to call me or Dr. Joseph Mack if additional questions arise.

## 2021-08-11 NOTE — PROGRESS NOTES
Complete bath given. Primofit applied. Snack given per request. IVF flowing without difficulty. CBWR. Bed alarm on.

## 2021-08-11 NOTE — PROGRESS NOTES
Problem: Falls - Risk of  Goal: *Absence of Falls  Description: Document Zhao Kevin Fall Risk and appropriate interventions in the flowsheet. Outcome: Progressing Towards Goal  Note: Fall Risk Interventions:  Mobility Interventions: Utilize gait belt for transfers/ambulation, Bed/chair exit alarm    Mentation Interventions: Adequate sleep, hydration, pain control                        Problem: Patient Education: Go to Patient Education Activity  Goal: Patient/Family Education  Outcome: Progressing Towards Goal     Problem: Pressure Injury - Risk of  Goal: *Prevention of pressure injury  Description: Document Malcom Scale and appropriate interventions in the flowsheet.   Outcome: Progressing Towards Goal  Note: Pressure Injury Interventions:  Sensory Interventions: Assess changes in LOC, Check visual cues for pain, Keep linens dry and wrinkle-free, Maintain/enhance activity level, Minimize linen layers    Moisture Interventions: Absorbent underpads, Apply protective barrier, creams and emollients, Maintain skin hydration (lotion/cream)    Activity Interventions: Increase time out of bed, PT/OT evaluation    Mobility Interventions: HOB 30 degrees or less    Nutrition Interventions: Document food/fluid/supplement intake    Friction and Shear Interventions: HOB 30 degrees or less, Apply protective barrier, creams and emollients, Minimize layers                Problem: Patient Education: Go to Patient Education Activity  Goal: Patient/Family Education  Outcome: Progressing Towards Goal

## 2021-08-11 NOTE — PROGRESS NOTES
Pt seen this am, wife at bedside. Pt had episode feeling lightheaded, felll yesterday. Poor historian, no further hx. Recnet echo already done. Repeat trop this am elevated to 0.14 though unclear significance w/o any cp. Unclear if his lightheadedness was an anginal equivolent. Will consult cardiology, and NP will see shortly.

## 2021-08-11 NOTE — PROGRESS NOTES
Wife at bedside\" talking about her  is going home he isn't staying nomore I am the power of  I turned it in last night in the ER they supposed to talk to me not him the Dr came in this morning and he didn't even say nothing to me, I need to talk to the Cardiologist to see if he have a aneurysm behind his heart, It's gonna be hot outside and I gotta get him home cause I have to AC's running and I got animals we gotta get back to\" nurse reassured wife that Mr Jorgito Mcdonald will be taken care of and she will be notified as necessary

## 2021-08-11 NOTE — PROGRESS NOTES
Patient admitted to unit from ED. AAOx4. Orthostatic BP obtained. Admission assessment completed. Wound care completed pictures added in chart. Oriented to room. CBWR. Bed alarm on.

## 2021-08-11 NOTE — PROGRESS NOTES
Problem: Mobility Impaired (Adult and Pediatric)  Goal: *Acute Goals and Plan of Care (Insert Text)  Description: Physical Therapy Goals  Initiated 8/11/2021 and to be accomplished within 7 day(s)  1. Patient will move from supine to sit and sit to supine , scoot up and down, and roll side to side in bed with supervision/set-up. 2.  Patient will transfer from bed to chair and chair to bed with supervision/set-up using the least restrictive device. 3.  Patient will perform sit to stand with supervision/set-up. 4.  Patient will ambulate with supervision/set-up for 25 feet with the least restrictive device. 5.  Patient will ascend/descend 4 stairs with 2 handrail(s) with minimal assistance/contact guard assist.    PLOF: Limited community ambulator who used a standard walker and who had assist for ADLs from wife. Outcome: Progressing Towards Goal   PHYSICAL THERAPY EVALUATION    Patient: Jordyn Thompson (74 y.o. male)  Date: 8/11/2021  Primary Diagnosis: Ambulatory dysfunction [R26.2]  Failure to thrive (0-17) [R62.51]  Multiple falls [R29.6]  Troponin I above reference range [R77.8]        Precautions:   Fall    ASSESSMENT :  Based on the objective data described below, the patient presents with multiple falls at home and he c/o pain in his feet, which is making it difficult for him to stand. He comes to stand 4 times during session and tries to take steps but he is not very successful due to his pain. He then returns to bed and is left with all needs met. Patient would benefit from further P.T. to improve strength, gait, and stair navigation. They would likely benefit from home health once medically stable. Patient will benefit from skilled intervention to address the above impairments.   Patient's rehabilitation potential is considered to be Good  Factors which may influence rehabilitation potential include:   []         None noted  []         Mental ability/status  []         Medical condition  [] Home/family situation and support systems  []         Safety awareness  [x]         Pain tolerance/management  []         Other:      PLAN :  Recommendations and Planned Interventions:   [x]           Bed Mobility Training             []    Neuromuscular Re-Education  [x]           Transfer Training                   []    Orthotic/Prosthetic Training  [x]           Gait Training                          []    Modalities  [x]           Therapeutic Exercises           []    Edema Management/Control  [x]           Therapeutic Activities            []    Family Training/Education  [x]           Patient Education  []           Other (comment):    Frequency/Duration: Patient will be followed by physical therapy 1-2 times per day/4-7 days per week to address goals. Discharge Recommendations: Home Health and Home Physical Therapy  Further Equipment Recommendations for Discharge: N/A     SUBJECTIVE:   Patient stated  my feet hurt.     OBJECTIVE DATA SUMMARY:     Past Medical History:   Diagnosis Date    Abnormality of gait     Benign neoplasm of colon     Depression     DM (diabetes mellitus) ([facility] [address]     Headache     Hypertension     Nonspecific (abnormal) findings on radiological and other examination of genitourinary organs     Other and unspecified hyperlipidemia     Personal history of fall     Right bundle branch block and left anterior fascicular block      Past Surgical History:   Procedure Laterality Date    COLONOSCOPY N/A 3/5/2018    COLONOSCOPY w/ biopsies w/ polypectomy performed by Dana Dee MD at [facility] ENDOSCOPY    HX CATARACT REMOVAL      HX CHOLECYSTECTOMY      HX COLONOSCOPY  3/12/2015    HX TONSILLECTOMY       Barriers to Learning/Limitations: None  Compensate with: N/A  Home Situation:  Home Situation  Home Environment: Trailer/mobile home  # Steps to Enter: 6  Rails to Enter: Yes  Hand Rails : Bilateral  Wheelchair Ramp: Yes  One/Two Story Residence: One story  Living Alone: No  Support Systems: Spouse/Significant Other/Partner  Patient Expects to be Discharged to[de-identified] House  Current DME Used/Available at Home: Niranjan Farnsworth, makenna, Jia Fernandes, Wheelchair (Wife states w/c is in poor condition)  Critical Behavior:  Neurologic State: Alert  Orientation Level: Oriented X4  Cognition: Follows commands  Strength:    Strength: Generally decreased, functional  RUE: 4/5  LUE: 4/5  RLE: 4/5  LLE: 4/5  Tone & Sensation:   Sensation: Intact  Range Of Motion:   AROM: Within functional limits  Posture:  Posture (WDL): Within defined limits  Functional Mobility:  Bed Mobility:  Supine to Sit: Minimum assistance  Sit to Supine: Contact guard assistance  Scooting: Contact guard assistance  Transfers:  Sit to Stand: Minimum assistance  Stand to Sit: Minimum assistance  Stand Pivot Transfers: (Other) comment (Unable to perform due to pain in feet)     Balance:   Sitting: Intact  Standing: Impaired  Standing - Static: Fair  Standing - Dynamic : Fair;Poor (Limited by pain in feet)  Ambulation/Gait Training:  Distance (ft): 1 Feet (ft) (He tries sidestepping at the bedside but is limited by pain)  Assistive Device: Walker, rolling  Ambulation - Level of Assistance: Minimal assistance  Gait Description (WDL): Exceptions to WDL  Gait Abnormalities: Antalgic  Pain:  Pain level pre-treatment: 0/10   Pain level post-treatment: 0/10   Pain Location: 10/10 in feet when standing  Pain Intervention(s) : Medication (see MAR); Rest, Ice, Repositioning  Response to intervention: Nurse notified, See doc flow    Activity Tolerance:   Poor, limited by pain in his feet  Please refer to the flowsheet for vital signs taken during this treatment.   After treatment:   []         Patient left in no apparent distress sitting up in chair  [x]         Patient left in no apparent distress in bed  [x]         Call bell left within reach  [x]         Nursing notified  [x]         Caregiver present  []         Bed alarm activated  []         SCDs applied    COMMUNICATION/EDUCATION:   [x]         Role of Physical Therapy in the acute care setting. []         Fall prevention education was provided and the patient/caregiver indicated understanding. [x]         Patient/family have participated as able in goal setting and plan of care. [x]         Patient/family agree to work toward stated goals and plan of care. []         Patient understands intent and goals of therapy, but is neutral about his/her participation. []         Patient is unable to participate in goal setting/plan of care: ongoing with therapy staff.  []         Other:     Thank you for this referral.  Star Townsend, PT, DPT   Time Calculation: 20 mins

## 2021-08-11 NOTE — PROGRESS NOTES
Comprehensive Nutrition Assessment    Type and Reason for Visit: Initial    Nutrition Recommendations/Plan: modify to a 4 carb choice cardiac restricted diet with glucerna BID    Nutrition Assessment:  [de-identified] yo male PMH: HTN, DM, High cholesterol   fall at home now pain in left leg difficulty walking R/O DVT. albumin 2.9 and pt underweight per BMI of 20.6 for adults > 73 yo recommended 22-25. PT has been consulted Pt and wife refusing SNF they want HH. Unable to perform malnutrition screening due to pt being in testing for echo. Start glucern BID due to low albumin and underweight for age per BMI.      Recent Results (from the past 24 hour(s))   URINALYSIS W/ RFLX MICROSCOPIC    Collection Time: 08/10/21  5:55 PM   Result Value Ref Range    Color Yellow      Appearance Clear      Specific gravity 1.007 1.005 - 1.030      pH (UA) 6.0 5.0 - 8.0      Protein 100 (A) Negative mg/dL    Glucose Negative Negative mg/dL    Ketone Negative Negative mg/dL    Bilirubin Negative Negative      Blood Trace (A) Negative      Urobilinogen 1.0 0.2 - 1.0 EU/dL    Nitrites Negative Negative      Leukocyte Esterase Negative Negative     URINE MICROSCOPIC    Collection Time: 08/10/21  5:55 PM   Result Value Ref Range    WBC 0-4 0 - 4 /hpf    RBC 0-5 0 - 2 /hpf    Bacteria Negative (A) None /hpf   GLUCOSE, POC    Collection Time: 08/10/21 11:57 PM   Result Value Ref Range    Glucose (POC) 72 70 - 110 mg/dL    Performed by Flakita Wallace    METABOLIC PANEL, BASIC    Collection Time: 08/11/21  4:41 AM   Result Value Ref Range    Sodium 138 135 - 145 mmol/L    Potassium 4.4 3.2 - 5.1 mmol/L    Chloride 108 94 - 111 mmol/L    CO2 24 21 - 33 mmol/L    Anion gap 6 mmol/L    Glucose 83 70 - 110 mg/dL    BUN 33 (H) 9 - 21 mg/dL    Creatinine 1.00 0.8 - 1.50 mg/dL    BUN/Creatinine ratio 33      GFR est AA >60 ml/min/1.73m2    GFR est non-AA >60 ml/min/1.73m2    Calcium 8.6 8.5 - 10.5 mg/dL   MAGNESIUM    Collection Time: 08/11/21  4:41 AM   Result Value Ref Range    Magnesium 2.3 1.7 - 2.8 mg/dL   CBC W/O DIFF    Collection Time: 08/11/21  4:41 AM   Result Value Ref Range    WBC 10.3 4.6 - 13.2 K/uL    RBC 3.38 (L) 4.70 - 5.50 M/uL    HGB 10.1 (L) 13.0 - 16.0 g/dL    HCT 28.9 (L) 36.0 - 48.0 %    MCV 85.5 74.0 - 97.0 FL    MCH 29.9 24.0 - 34.0 PG    MCHC 34.9 31.0 - 37.0 g/dL    RDW 14.2 11.6 - 14.5 %    PLATELET 765 945 - 517 K/uL    MPV 11.6 9.2 - 11.8 FL   TROPONIN I    Collection Time: 08/11/21  4:41 AM   Result Value Ref Range    Troponin-I, Qt. 0.14 (H) 0.02 - 0.05 ng/mL   GLUCOSE, POC    Collection Time: 08/11/21  7:09 AM   Result Value Ref Range    Glucose (POC) 108 70 - 110 mg/dL    Performed by Kris Cortez Student    TROPONIN I    Collection Time: 08/11/21 10:30 AM   Result Value Ref Range    Troponin-I, Qt. 0.14 (H) 0.02 - 0.05 ng/mL   EKG, 12 LEAD, SUBSEQUENT    Collection Time: 08/11/21 10:48 AM   Result Value Ref Range    Ventricular Rate 64 BPM    Atrial Rate 64 BPM    P-R Interval 201 ms    QRS Duration 146 ms    Q-T Interval 450 ms    QTC Calculation (Bezet) 465 ms    Calculated P Axis -72 degrees    Calculated R Axis -47 degrees    Calculated T Axis 1 degrees    Diagnosis       Sinus or ectopic atrial rhythm  RBBB and LAFB  Left ventricular hypertrophy    Confirmed by Gracia Perez ([id number]) on 8/11/2021 2:22:37 PM     GLUCOSE, POC    Collection Time: 08/11/21 11:04 AM   Result Value Ref Range    Glucose (POC) 128 (H) 70 - 110 mg/dL    Performed by [employer]en Sotelo        Malnutrition Assessment:  Malnutrition Status:  Insufficient data (Pt currently in testing)    Context:  Acute illness     Findings of the 6 clinical characteristics of malnutrition:   Energy Intake:  Unable to assess  Weight Loss:  Unable to assess     Body Fat Loss:  Unable to assess,     Muscle Mass Loss:  Unable to assess,    Fluid Accumulation:  Unable to assess,     Strength:  Not performed         Estimated Daily Nutrient Needs:  Energy (kcal): 1088-6953 kcal/day; Weight Used for Energy Requirements: Admission (58 kg)  Protein (g): 58-70 g/day; Weight Used for Protein Requirements: Admission (1-1.2 g/kg)  Fluid (ml/day): 6505-4701 mL/day; Method Used for Fluid Requirements: 1 ml/kcal      Nutrition Related Findings:  fall at home now pain in left leg difficulty walking R/O DVT. albumin 2.9 and pt underweight per BMI for age of 23.6. PT has been consulted Pt and wife refusing SNF they want HH      Wounds:            Current Nutrition Therapies:  ADULT DIET Regular; Low Fat/Low Chol/High Fiber/2 gm Na;  Low Sodium (2 gm)    Anthropometric Measures:  · Height:  5' 6\" (167.6 cm)  · Current Body Wt:  57.6 kg (127 lb)   · Admission Body Wt:  127 lb    · Usual Body Wt:        · Ideal Body Wt:  142 lbs:  89.4 %   · Adjusted Body Weight:   ; Weight Adjustment for: No adjustment   · Adjusted BMI:       · BMI Category:  Underweight (BMI less than 22) age over 72       Nutrition Diagnosis:   · Increased nutrient needs, Inadequate protein-energy intake related to increased demand for energy/nutrients as evidenced by other (specify), lab values (advanced age [de-identified] yo, prevent loss of lean body mass albumin < 3.5)      Nutrition Interventions:   Food and/or Nutrient Delivery: Modify current diet, Start oral nutrition supplement  Nutrition Education and Counseling:    Coordination of Nutrition Care: Continue to monitor while inpatient    Goals:  ablumin 3.5-5.0, glucose , Hgb A1c < 7, Pt to eat > 75% of meals, BMI 22-25 for adults > 73 yo       Nutrition Monitoring and Evaluation:   Behavioral-Environmental Outcomes:    Food/Nutrient Intake Outcomes: Food and nutrient intake, Supplement intake  Physical Signs/Symptoms Outcomes: Biochemical data, Meal time behavior, Weight     F/U: 8/13/2021    Discharge Planning:    Continue current diet, Continue oral nutrition supplement     Electronically signed by Renetta Cardenas on 8/11/2021 at 3:55 PM    Contact: CELIA [phone]

## 2021-08-11 NOTE — ED NOTES
TRANSFER - OUT REPORT:    Verbal report given to [facility] [place] on [place]  being transferred to  for routine progression of care       Report consisted of patients Situation, Background, Assessment and   Recommendations(SBAR). Information from the following report(s) SBAR was reviewed with the receiving nurse. Lines:   Peripheral IV 08/10/21 Left Arm (Active)        Opportunity for questions and clarification was provided.       Patient transported with:   [employer]

## 2021-08-11 NOTE — ASSESSMENT & PLAN NOTE
-patient status post fall, stating unable to ambulate  -PT/OT consulted  -wife seeking assistance at home personal Care)  -case management consulted

## 2021-08-11 NOTE — H&P
History and Physical    Subjective:     Beatris Chew is a [de-identified] y.o.  male with a past medical history of hypertension, hypocholesteremia, diabetes (diet-controlled), and dementia, patient presented to the ED with a chief complaint of sustaining a fall. Patient states that he was trying to carry some groceries into the house, and he was not using his walker, he became dizzy and he fell, patient denies blacking out. Patient denies headaches, shortness of breath, chest pain, and palpitations. Wife is no longer at the bedside and patient is a poor historian. While patient was in the ED they attempted to walk him, but patient is having difficulties walking and wife is stating that his becoming difficulty to care for patient alone at home. She says she refused to place him in a nursing home but would like to have some in-home care for her . In the ED H&H 9.4/26.9, daily negative, 138, BUN 40, albumin 2.9 CT of the head negative for any acute intracranial abnormalities chest x-ray revealed cardiomegaly, retrocardiac density, EKG shows sinus or ectopic atrial rhythm with right bundle branch block and left ventricular hypertrophy in the ED patient received a 1 L fluid bolus. Admit to telemetry. Patient assessed in the ED, at the bedside, patient is alert and oriented, there is no acute distress noted. Patient agrees to admission for a diagnosis of syncope of unknown origin, treatment to include IV hydration, cardiac monitoring, orthostatic blood pressure, and PT consult along with case management consult.     Past Medical History:   Diagnosis Date    Abnormality of gait     Benign neoplasm of colon     Depression     DM (diabetes mellitus) ([facility] [address]     Headache     Hypertension     Nonspecific (abnormal) findings on radiological and other examination of genitourinary organs     Other and unspecified hyperlipidemia     Personal history of fall     Right bundle branch block and left anterior fascicular block       Past Surgical History:   Procedure Laterality Date    COLONOSCOPY N/A 3/5/2018    COLONOSCOPY w/ biopsies w/ polypectomy performed by Kendrick Pires MD at [facility]    HX CATARACT REMOVAL      HX CHOLECYSTECTOMY      HX COLONOSCOPY  3/12/2015    HX TONSILLECTOMY       Family History   Problem Relation Age of Onset    Diabetes Mother     Alzheimer Mother     Cancer Father         Lung Cancer      Social History     Tobacco Use    Smoking status: Former Smoker     Types: Cigarettes    Smokeless tobacco: Never Used    Tobacco comment: Quit 40 years ago    Substance Use Topics    Alcohol use: Not Currently     Alcohol/week: 0.0 standard drinks       Prior to Admission medications    Medication Sig Start Date End Date Taking? Authorizing Provider   hydrALAZINE (APRESOLINE) 50 mg tablet TAKE 1 TABLET BY MOUTH THREE TIMES DAILY 7/2/21  Yes Other, MD Dedra   simvastatin (ZOCOR) 20 mg tablet Take  by mouth nightly.    Yes Provider, Historical   losartan (COZAAR) 100 mg tablet TAKE 1 TABLET BY MOUTH EVERY DAY 5/5/21   Other, MD Dedra   cephALEXin (KEFLEX) 500 mg capsule TAKE ONE CAPSULE BY MOUTH TWICE DAILY X 7 DAYS 8/4/21   Other, MD Dedra   chlorthalidone (HYGROTEN) 50 mg tablet TAKE 1 TABLET BY MOUTH EVERY DAY IN THE MORNING WITH FOOD 7/2/21   Other, MD Dedra     Allergies   Allergen Reactions    Oxycontin [Oxycodone] Other (comments)     hallucinations    Vicodin [Hydrocodone-Acetaminophen] Other (comments)          REVIEW OF SYSTEMS:       Total of 12 systems reviewed as follows:    Positive = Red  Constitutional: Negative for malaise/fatigue and weakness, negative for fever and chills, dizziness with a fall   HENT: Negative for ear pain, headaches, negative for loss of sense of taste and smell   Eyes: Negative for blurred vision and double vision   Skin: Negative for itching, negative for open areas   Cardiovascular: Negative for chest pain, palpitations, negative for swelling   Respiratory: Negative for shortness or breath, negative for cough, negative for sputum production   Gastrointestinal: Negative for abdominal pain, constipation, nausea, vomiting, and diarrhea   Genitourinary: Negative for dysuria, frequency, and hematuria   Musculoskeletal: Negative for joint pain and myalgias   Neurological: Negative for dizziness, seizures, and headaches   Psychiatric: Negative for depression and anxiousness        Objective:   VITALS:    Visit Vitals  BP (!) 144/59   Pulse 73   Temp 97.7 °F (36.5 °C)   Resp 20   Ht 5' 6\" (1.676 m)   Wt 70.3 kg (155 lb)   SpO2 97%   BMI 25.02 kg/m²       PHYSICAL EXAM:  Positive = Red  Constitutional: Alert and oriented x name,, situation, reoriented to place, elderly frail male and no noted acute distress appears to be stated age. HENT: Atraumatic, nose midline, oropharynx clear ad moist, trachea midline, no supraclavicular   Eyes: Conjunctiva normal and pupils equal   Skin: Multiple bruising scattered throughout body and 2 venous ulcers to the left and right shin   Cardiovascular: Regular rate and rhythm, normal heart sounds, no murmurs, pulses palpable, no noted edema   Respiratory: Lungs clear throughout, no wheezes, rales, or rhonchi, effort normal   Gastrointestinal: Appearance normal, bowel sounds are normal, bowl soft and non-tender   Genitourinary: Deferred   Musculoskeletal: Decreased range of motion, noted bilateral LE weakness   Neurological: Alert and oriented x 3, awake. No facial droop. No slurred speech.  Hand grasps equal.    Psychiatric: Affect normal, Answers questions appropriately     __________________________________________________  Care Plan discussed with:    Comments   Patient X    Family      RN     Care Manager                    Consultant:      _______________________________________________________________________  Expected  Disposition:   Home with Family X   HH/PT/OT/RN    SNF/LTC    [facility] ________________________________________________________________________    Labs:  Recent Results (from the past 24 hour(s))   EKG, 12 LEAD, INITIAL    Collection Time: 08/10/21  2:08 PM   Result Value Ref Range    Ventricular Rate 64 BPM    Atrial Rate 64 BPM    P-R Interval 188 ms    QRS Duration 143 ms    Q-T Interval 463 ms    QTC Calculation (Bezet) 478 ms    Calculated P Axis -71 degrees    Calculated R Axis -41 degrees    Calculated T Axis -9 degrees    Diagnosis       Sinus or ectopic atrial rhythm  RBBB and LAFB  Left ventricular hypertrophy     CBC WITH AUTOMATED DIFF    Collection Time: 08/10/21  2:24 PM   Result Value Ref Range    WBC 7.8 4.6 - 13.2 K/uL    RBC 3.13 (L) 4.70 - 5.50 M/uL    HGB 9.4 (L) 13.0 - 16.0 g/dL    HCT 26.9 (L) 36.0 - 48.0 %    MCV 85.9 74.0 - 97.0 FL    MCH 30.0 24.0 - 34.0 PG    MCHC 34.9 31.0 - 37.0 g/dL    RDW 14.0 11.6 - 14.5 %    PLATELET 389 051 - 845 K/uL    MPV 11.4 9.2 - 11.8 FL    NEUTROPHILS 89 (H) 40 - 73 %    LYMPHOCYTES 3 (L) 21 - 52 %    MONOCYTES 7 3 - 10 %    EOSINOPHILS 0 0 - 5 %    BASOPHILS 1 0 - 2 %    IMMATURE GRANULOCYTES 0 %    ABS. NEUTROPHILS 7.0 1.8 - 8.0 K/UL    ABS. LYMPHOCYTES 0.2 (L) 0.9 - 3.6 K/UL    ABS. MONOCYTES 0.5 0.05 - 1.2 K/UL    ABS. EOSINOPHILS 0.0 0.0 - 0.4 K/UL    ABS. BASOPHILS 0.1 0.0 - 0.1 K/UL    ABS. IMM.  GRANS. 0.0 K/UL    PLATELET COMMENTS Adequate      RBC COMMENTS Normocytic, Normochromic     METABOLIC PANEL, COMPREHENSIVE    Collection Time: 08/10/21  2:24 PM   Result Value Ref Range    Sodium 135 135 - 145 mmol/L    Potassium 4.3 3.2 - 5.1 mmol/L    Chloride 107 94 - 111 mmol/L    CO2 22 21 - 33 mmol/L    Anion gap 6 mmol/L    Glucose 138 (H) 70 - 110 mg/dL    BUN 40 (H) 9 - 21 mg/dL    Creatinine 1.00 0.8 - 1.50 mg/dL    BUN/Creatinine ratio 40      GFR est AA >60 ml/min/1.73m2    GFR est non-AA >60 ml/min/1.73m2    Calcium 8.1 (L) 8.5 - 10.5 mg/dL    Bilirubin, total 0.6 0.2 - 1.0 mg/dL    AST (SGOT) 70 17 - 74 U/L    ALT (SGPT) 66 3 - 72 U/L    Alk. phosphatase 80 38 - 126 U/L    Protein, total 5.5 (L) 6.1 - 8.4 g/dL    Albumin 2.9 (L) 3.5 - 4.7 g/dL    Globulin 2.6 g/dL    A-G Ratio 1.1     TROPONIN I    Collection Time: 08/10/21  2:24 PM   Result Value Ref Range    Troponin-I, Qt. 0.05 0.02 - 0.05 ng/mL   URINALYSIS W/ RFLX MICROSCOPIC    Collection Time: 08/10/21  5:55 PM   Result Value Ref Range    Color Yellow      Appearance Clear      Specific gravity 1.007 1.005 - 1.030      pH (UA) 6.0 5.0 - 8.0      Protein 100 (A) Negative mg/dL    Glucose Negative Negative mg/dL    Ketone Negative Negative mg/dL    Bilirubin Negative Negative      Blood Trace (A) Negative      Urobilinogen 1.0 0.2 - 1.0 EU/dL    Nitrites Negative Negative      Leukocyte Esterase Negative Negative     URINE MICROSCOPIC    Collection Time: 08/10/21  5:55 PM   Result Value Ref Range    WBC 0-4 0 - 4 /hpf    RBC 0-5 0 - 2 /hpf    Bacteria Negative (A) None /hpf       Imaging:  CT HEAD WO CONT    Result Date: 8/10/2021  EXAM: CT head INDICATION: Headache. COMPARISON: 1/23/2015 TECHNIQUE: Axial CT imaging of the head was performed without intravenous contrast. Standard multiplanar coronal and sagittal reformatted images were obtained and are included in interpretation. One or more dose reduction techniques were used on this CT: automated exposure control, adjustment of the mAs and/or kVp according to patient size, and iterative reconstruction techniques. The specific techniques used on this CT exam have been documented in the patient's electronic medical record. Digital Imaging and Communications in Medicine (DICOM) format image data are available to nonaffiliated external healthcare facilities or entities on a secure, media free, reciprocally searchable basis with patient authorization for at least a 12-month period after this study.  _______________ FINDINGS: BRAIN AND POSTERIOR FOSSA: Patchy periventricular, deep and subcortical white matter hypoattenuation which is nonspecific but likely represents chronic ischemic changes. No evidence of acute large vessel transcortical infarct or acute parenchymal hemorrhage. No midline shift or hydrocephalus. Right basilar lacunar infarct. EXTRA-AXIAL SPACES AND MENINGES: There are no abnormal extra-axial fluid collections. CALVARIUM: Intact. SINUSES: Clear. OTHER: None. _______________     No acute intracranial abnormality. CT CHEST W CONT    Result Date: 8/10/2021  EXAM: CT chest. INDICATION: Retrocardiac mass seen on plain radiograph. COMPARISON: Plain radiograph, earlier the same day. Chest CT 5/29/2016 TECHNIQUE: Axial CT imaging from the thoracic inlet through the diaphragm following nonionic intravenous contrast. Multiplanar reformats were generated. One or more dose reduction techniques were used on this CT: automated exposure control, adjustment of the mAs and/or kVp according to patient size, and iterative reconstruction techniques. The specific techniques used on this CT exam have been documented in the patient's electronic medical record. _______________ FINDINGS: LUNGS: Mild dependent bibasilar subsegmental atelectasis. No evidence of consolidation or lung mass. AIRWAY: Unremarkable. PLEURAL SPACES: Tiny right pleural effusion. MEDIASTINUM: Mild to moderate cardiomegaly. No pericardial effusion. Mild to moderate atherosclerotic aortic calcification. LYMPH NODES: No enlarged lymph nodes. UPPER ABDOMEN: Cholecystectomy. Hepatic steatosis. OTHER: No fracture or suspicious bone lesion. _______________     1. No evidence of retrocardiac or chest mass. Finding on chest x-ray likely reflect atelectasis. 2. Mild dependent bibasilar pulmonary atelectasis with tiny right pleural effusion. 3. Atherosclerotic disease. XR PELV 1 OR 2 V    Result Date: 8/10/2021  EXAM: XR PELV 1 OR 2 V CLINICAL INDICATION/HISTORY: Fall   > Additional: None. COMPARISON: None. TECHNIQUE: Frontal view of the pelvis. _______________ FINDINGS: Limited study secondary to obliquity of frontal view. No definite acute displaced fracture. Alignment is anatomic. No area of erosion or aggressive-appearing bone destruction. _______________     Limited study secondary to obliquity of frontal view. No definite acute displaced fracture. XR CHEST PORT    Result Date: 8/10/2021  EXAM: XR CHEST PORT CLINICAL INDICATION/HISTORY: Fall -Additional: None COMPARISON: 5/29/2016 TECHNIQUE: Portable frontal view of the chest _______________ FINDINGS: SUPPORT DEVICES: None. HEART AND MEDIASTINUM: Cardiomegaly. LUNGS AND PLEURAL SPACES: Retrocardiac density. No pneumothorax. _______________     Cardiomegaly. Retrocardiac density. Assessment & Plan:     Syncope  -start IV hydration  -EKG/ continuous telemetry monitoring   -check orthostatic BP  -I&O  -CBC, BMP< LFTs, UA  -ECHO done on 3/9/21 and shown Estimated LVEF is 55 - 60%. Visually measured ejection fraction. Normal cavity size and systolic function (ejection fraction normal). Mildly to moderately increased wall thickness. Wall motion: normal. Age-appropriate left ventricular diastolic function.   -CT head: no intracranial findings  -discontinue offending agents  -PT/OT consults     Multiple falls  -numerous falls (not using walker) without blackout  -PT/OT consulted  -safety precautions  -fall precaution    Ambulatory dysfunction/Debilitation  -patient status post fall, stating unable to ambulate  -PT/OT consulted  -wife seeking assistance at home personal Care)  -case management consulted    Vascular Wounds  -daily dressing changes  -consult wound nurse    Diabetes Mellitus  -diet controlled  -monitor POC before meals and bedtime    Hypertension  -chronic/stable  -continue Hydralazine  -monitor BP closely    Hypercholesteremia  -chronic  -continue statin    TOTAL TIME:  45 Minutes    Code Status: Full    Prophylaxis:  Lovenox    Electronically Signed : [facility]    Please note that this dictation was completed with [employer], the computer voice recognition software. Quite often unanticipated grammatical, syntax, homophones, and other interpretive errors are inadvertently transcribed by the computer software. Please disregard these errors. Please excuse any errors that have escaped final proofreading. Thank you.

## 2021-08-11 NOTE — ASSESSMENT & PLAN NOTE
-numerous falls (not using walker) without blackout  -PT/OT consulted  -safety precautions  -fall precaution

## 2021-08-11 NOTE — PROGRESS NOTES
Progress Note    Patient: Estel Riedel MRN: [id number]  SSN: [id number]    YOB: 1940  Age: [de-identified] y.o. Sex: male      Admit Date: 8/10/2021    LOS: 0 days     Subjective:   Estel Riedel is a [de-identified]y.o. year-old male with past medical history significant for diabetes, HTN, hyperlipidemia, depression, who was admitted 8/10/21 for complaints of fall. Also found hypotensive in ED, and subsequently with elevated troponin this a.m. Pt was seen and examined today at bedside, no family present. He is oriented to person, place, time and situation. He denies chest pain, shortness of breath, fevers or chills. No nausea, vomitting, abdominal pain, diarrhea. He complains of severe left leg pain, he states\" my feet have been wobbly\" He does not want a SNF placement due to previous unpleasant experiences. States he'd rather go home with aide or home health. Objective:     Vitals:    08/11/21 0815 08/11/21 0830 08/11/21 1134 08/11/21 1200   BP: (!) 126/43 (!) 130/53 (!) 134/49    Pulse: 63 70 65 71   Resp:   16    Temp:   98.1 °F (36.7 °C)    SpO2:   95%    Weight:       Height:            Intake and Output:  Current Shift: No intake/output data recorded. Last three shifts: 08/09 1901 - 08/11 0700  In: 1879.2 [P.O.:600; I.V.:1279.2]  Out: 600 [Urine:600]    Physical Exam:   Physical Exam  Vitals and nursing note reviewed. Constitutional:       Appearance: Normal appearance. Comments: Pleasant elderly male oriented x 4, appears stated, well developed, no acute distress   HENT:      Head: Normocephalic and atraumatic. Mouth/Throat:      Mouth: Mucous membranes are moist.   Eyes:      Extraocular Movements: Extraocular movements intact. Pupils: Pupils are equal, round, and reactive to light. Cardiovascular:      Rate and Rhythm: Normal rate and regular rhythm. Pulses: Normal pulses. Heart sounds: Murmur heard.      Pulmonary:      Effort: Pulmonary effort is normal. Comments: scattered crackles in bilateral lobes  Abdominal:      General: Abdomen is flat. Bowel sounds are normal.      Palpations: Abdomen is soft. Musculoskeletal:      Cervical back: Normal range of motion and neck supple. Comments: diminished ROM in bilateral legs, weak strength 3/5 bilaterally, severe left leg tenderness. Skin:     General: Skin is warm and dry. Capillary Refill: Capillary refill takes less than 2 seconds. Comments: Multiple bruising, multiple bilateral lower extremity ulcerated lesions, left leg tenderness   Neurological:      General: No focal deficit present. Mental Status: He is alert and oriented to person, place, and time. Mental status is at baseline. Psychiatric:         Mood and Affect: Mood normal.         Behavior: Behavior normal.         Lab/Data Review:  Recent Results (from the past 24 hour(s))   EKG, 12 LEAD, INITIAL    Collection Time: 08/10/21  2:08 PM   Result Value Ref Range    Ventricular Rate 64 BPM    Atrial Rate 64 BPM    P-R Interval 188 ms    QRS Duration 143 ms    Q-T Interval 463 ms    QTC Calculation (Bezet) 478 ms    Calculated P Axis -71 degrees    Calculated R Axis -41 degrees    Calculated T Axis -9 degrees    Diagnosis       Sinus or ectopic atrial rhythm  RBBB and LAFB  Left ventricular hypertrophy    Confirmed by Pedro Agee ([id number]) on 8/11/2021 10:44:45 AM     CBC WITH AUTOMATED DIFF    Collection Time: 08/10/21  2:24 PM   Result Value Ref Range    WBC 7.8 4.6 - 13.2 K/uL    RBC 3.13 (L) 4.70 - 5.50 M/uL    HGB 9.4 (L) 13.0 - 16.0 g/dL    HCT 26.9 (L) 36.0 - 48.0 %    MCV 85.9 74.0 - 97.0 FL    MCH 30.0 24.0 - 34.0 PG    MCHC 34.9 31.0 - 37.0 g/dL    RDW 14.0 11.6 - 14.5 %    PLATELET 097 461 - 020 K/uL    MPV 11.4 9.2 - 11.8 FL    NEUTROPHILS 89 (H) 40 - 73 %    LYMPHOCYTES 3 (L) 21 - 52 %    MONOCYTES 7 3 - 10 %    EOSINOPHILS 0 0 - 5 %    BASOPHILS 1 0 - 2 %    IMMATURE GRANULOCYTES 0 %    ABS.  NEUTROPHILS 7.0 1.8 - 8.0 K/UL ABS. LYMPHOCYTES 0.2 (L) 0.9 - 3.6 K/UL    ABS. MONOCYTES 0.5 0.05 - 1.2 K/UL    ABS. EOSINOPHILS 0.0 0.0 - 0.4 K/UL    ABS. BASOPHILS 0.1 0.0 - 0.1 K/UL    ABS. IMM. GRANS. 0.0 K/UL    PLATELET COMMENTS Adequate      RBC COMMENTS Normocytic, Normochromic     METABOLIC PANEL, COMPREHENSIVE    Collection Time: 08/10/21  2:24 PM   Result Value Ref Range    Sodium 135 135 - 145 mmol/L    Potassium 4.3 3.2 - 5.1 mmol/L    Chloride 107 94 - 111 mmol/L    CO2 22 21 - 33 mmol/L    Anion gap 6 mmol/L    Glucose 138 (H) 70 - 110 mg/dL    BUN 40 (H) 9 - 21 mg/dL    Creatinine 1.00 0.8 - 1.50 mg/dL    BUN/Creatinine ratio 40      GFR est AA >60 ml/min/1.73m2    GFR est non-AA >60 ml/min/1.73m2    Calcium 8.1 (L) 8.5 - 10.5 mg/dL    Bilirubin, total 0.6 0.2 - 1.0 mg/dL    AST (SGOT) 70 17 - 74 U/L    ALT (SGPT) 66 3 - 72 U/L    Alk.  phosphatase 80 38 - 126 U/L    Protein, total 5.5 (L) 6.1 - 8.4 g/dL    Albumin 2.9 (L) 3.5 - 4.7 g/dL    Globulin 2.6 g/dL    A-G Ratio 1.1     TROPONIN I    Collection Time: 08/10/21  2:24 PM   Result Value Ref Range    Troponin-I, Qt. 0.05 0.02 - 0.05 ng/mL   URINALYSIS W/ RFLX MICROSCOPIC    Collection Time: 08/10/21  5:55 PM   Result Value Ref Range    Color Yellow      Appearance Clear      Specific gravity 1.007 1.005 - 1.030      pH (UA) 6.0 5.0 - 8.0      Protein 100 (A) Negative mg/dL    Glucose Negative Negative mg/dL    Ketone Negative Negative mg/dL    Bilirubin Negative Negative      Blood Trace (A) Negative      Urobilinogen 1.0 0.2 - 1.0 EU/dL    Nitrites Negative Negative      Leukocyte Esterase Negative Negative     URINE MICROSCOPIC    Collection Time: 08/10/21  5:55 PM   Result Value Ref Range    WBC 0-4 0 - 4 /hpf    RBC 0-5 0 - 2 /hpf    Bacteria Negative (A) None /hpf   GLUCOSE, POC    Collection Time: 08/10/21 11:57 PM   Result Value Ref Range    Glucose (POC) 72 70 - 110 mg/dL    Performed by [address] [employer], BASIC    Collection Time: 08/11/21  4:41 AM Result Value Ref Range    Sodium 138 135 - 145 mmol/L    Potassium 4.4 3.2 - 5.1 mmol/L    Chloride 108 94 - 111 mmol/L    CO2 24 21 - 33 mmol/L    Anion gap 6 mmol/L    Glucose 83 70 - 110 mg/dL    BUN 33 (H) 9 - 21 mg/dL    Creatinine 1.00 0.8 - 1.50 mg/dL    BUN/Creatinine ratio 33      GFR est AA >60 ml/min/1.73m2    GFR est non-AA >60 ml/min/1.73m2    Calcium 8.6 8.5 - 10.5 mg/dL   MAGNESIUM    Collection Time: 08/11/21  4:41 AM   Result Value Ref Range    Magnesium 2.3 1.7 - 2.8 mg/dL   CBC W/O DIFF    Collection Time: 08/11/21  4:41 AM   Result Value Ref Range    WBC 10.3 4.6 - 13.2 K/uL    RBC 3.38 (L) 4.70 - 5.50 M/uL    HGB 10.1 (L) 13.0 - 16.0 g/dL    HCT 28.9 (L) 36.0 - 48.0 %    MCV 85.5 74.0 - 97.0 FL    MCH 29.9 24.0 - 34.0 PG    MCHC 34.9 31.0 - 37.0 g/dL    RDW 14.2 11.6 - 14.5 %    PLATELET 176 934 - 592 K/uL    MPV 11.6 9.2 - 11.8 FL   TROPONIN I    Collection Time: 08/11/21  4:41 AM   Result Value Ref Range    Troponin-I, Qt. 0.14 (H) 0.02 - 0.05 ng/mL   GLUCOSE, POC    Collection Time: 08/11/21  7:09 AM   Result Value Ref Range    Glucose (POC) 108 70 - 110 mg/dL    Performed by [employer] Student    TROPONIN I    Collection Time: 08/11/21 10:30 AM   Result Value Ref Range    Troponin-I, Qt. 0.14 (H) 0.02 - 0.05 ng/mL   GLUCOSE, POC    Collection Time: 08/11/21 11:04 AM   Result Value Ref Range    Glucose (POC) 128 (H) 70 - 110 mg/dL    Performed by [employer] Student        Assessment:     Active Problems:    Failure to thrive (0-17) (8/10/2021)      Multiple falls (8/10/2021)      Ambulatory dysfunction (8/10/2021)      Syncope (8/10/2021)      Hypertension (8/10/2021)      Hypercholesteremia (8/10/2021)      Troponin I above reference range (8/11/2021)        Plan:   Falls:  -hx of multiple falls, and impaired gait.  -UA neg. CXR-cardiomegaly. CT chest unremarkable. -PT/OT eval and treat  -case mgt for d/c planning, pt does not want snf.  Prefers home health aide.    Hypotension:  -improving with IV Fluids. -orthostatic Bps neg x 2.  -seen and evaluated by cardio, recs to d/c hydralazine due to its vasodilatory effects. Cont amlodipine for HTN. Elevated troponin:  -trops-0.05-->0.14-->0.14, ECG-non ischemic. No chest pain. -echo 3/2021 shows an EF of 77-11%, diastolic dysfunction. -seen and evaluated by cardiology. Recs for outpt stress test.    Gait impairment:  -PT eval and treat. Case mgt for assistance with home health. Pt states he usually ambulates with a walker. States \"feet been wobbly lately\". Will r/o dvt in left leg today due to severe pain in left leg. Vascular wounds:  -chronic issue, wound care daily. -with left leg pain, will r/o dvt. Diabetes Mellitus:   -chronic issue, diet controlled. -continue accuchecks achs, SSI prn. Hyperlipidemia:  -chronic issue on statin. Code status: Full  VTE prophylaxis: Lovenox. Plan of care discussed with patient. No family at bedside. attempted phone call to emergency contact to provide updates, not picking. Total time spent: 35 minutes.                   Signed By: Emiliana Hicks NP     August 11, 2021

## 2021-08-11 NOTE — PROGRESS NOTES
Bedside shift change report given to [address] [place] [address] and [address] [place] [address] [address] (oncoming nurse) by Blu Marroquin (offgoing nurse). Report included the following information SBAR, MAR, Recent Results and Cardiac Rhythm SR c 1st deg AVB. 1925- assessed pt, inserted new IV, started a heparin drip. Pt tolerated well, CBWR.

## 2021-08-11 NOTE — PROGRESS NOTES
Received care of patient resting in bed eyes open , polite, no complaints of pain voiced CBWR bed in lowest position bed alarm on

## 2021-08-11 NOTE — ASSESSMENT & PLAN NOTE
-start IV hydration  -EKG/ continuous telemetry monitoring   -check orthostatic BP  -I&O  -CBC, BMP< LFTs, UA  -ECHO done on 3/9/21 and shown Estimated LVEF is 55 - 60%. Visually measured ejection fraction. Normal cavity size and systolic function (ejection fraction normal). Mildly to moderately increased wall thickness. Wall motion: normal. Age-appropriate left ventricular diastolic function.   -CT head: no intracranial findings  -discontinue offending agents  -PT/OT consults

## 2021-08-12 ENCOUNTER — APPOINTMENT (OUTPATIENT)
Dept: NON INVASIVE DIAGNOSTICS | Age: 81
DRG: 315 | End: 2021-08-12
Attending: NURSE PRACTITIONER
Payer: MEDICARE

## 2021-08-12 LAB
ANION GAP SERPL CALC-SCNC: 6 MMOL/L
APTT PPP: 151 SEC (ref 23–36.4)
BUN SERPL-MCNC: 36 MG/DL (ref 9–21)
BUN/CREAT SERPL: 28
CA-I BLD-MCNC: 8.5 MG/DL (ref 8.5–10.5)
CHLORIDE SERPL-SCNC: 109 MMOL/L (ref 94–111)
CO2 SERPL-SCNC: 24 MMOL/L (ref 21–33)
COVID-19 RAPID TEST, COVR: NOT DETECTED
CREAT SERPL-MCNC: 1.3 MG/DL (ref 0.8–1.5)
ECHO AO ASC DIAM: 3.6 CM
ECHO AO ROOT DIAM: 4 CM
ECHO AV CUSP MM: 1.6 CM
ECHO LA MAJOR AXIS: 4.1 CM
ECHO LA MINOR AXIS: 2.46 CM
ECHO LA TO AORTIC ROOT RATIO: 1.02
ECHO LV EDV A2C: 97.3 CM3
ECHO LV EJECTION FRACTION BIPLANE: 61 % (ref 55–100)
ECHO LV ESV A2C: 29.8 CM3
ECHO LV INTERNAL DIMENSION DIASTOLIC: 4.6 CM (ref 4.2–5.9)
ECHO LV INTERNAL DIMENSION SYSTOLIC: 3.1 CM
ECHO LV IVSD: 1.3 CM (ref 0.6–1)
ECHO LV MASS 2D: 217.4 G (ref 88–224)
ECHO LV MASS INDEX 2D: 130.2 G/M2 (ref 49–115)
ECHO LV POSTERIOR WALL DIASTOLIC: 1.2 CM (ref 0.6–1)
ECHO LVOT DIAM: 2.2 CM
ERYTHROCYTE [DISTWIDTH] IN BLOOD BY AUTOMATED COUNT: 14.6 % (ref 11.6–14.5)
GLUCOSE BLD STRIP.AUTO-MCNC: 111 MG/DL (ref 70–110)
GLUCOSE BLD STRIP.AUTO-MCNC: 120 MG/DL (ref 70–110)
GLUCOSE BLD STRIP.AUTO-MCNC: 149 MG/DL (ref 70–110)
GLUCOSE BLD STRIP.AUTO-MCNC: 184 MG/DL (ref 70–110)
GLUCOSE SERPL-MCNC: 153 MG/DL (ref 70–110)
HCT VFR BLD AUTO: 30.8 % (ref 36–48)
HGB BLD-MCNC: 10.5 G/DL (ref 13–16)
MCH RBC QN AUTO: 29.7 PG (ref 24–34)
MCHC RBC AUTO-ENTMCNC: 34.1 G/DL (ref 31–37)
MCV RBC AUTO: 87.3 FL (ref 74–97)
PERFORMED BY, TECHID: ABNORMAL
PLATELET # BLD AUTO: 327 K/UL (ref 135–420)
PMV BLD AUTO: 11.5 FL (ref 9.2–11.8)
POTASSIUM SERPL-SCNC: 4.1 MMOL/L (ref 3.2–5.1)
RBC # BLD AUTO: 3.53 M/UL (ref 4.7–5.5)
SODIUM SERPL-SCNC: 139 MMOL/L (ref 135–145)
SPECIMEN SOURCE: NORMAL
THERAPEUTIC RANGE,PTTT: ABNORMAL SEC (ref 82–109)
TROPONIN I SERPL-MCNC: 0.09 NG/ML (ref 0.02–0.05)
WBC # BLD AUTO: 14.6 K/UL (ref 4.6–13.2)

## 2021-08-12 PROCEDURE — 36415 COLL VENOUS BLD VENIPUNCTURE: CPT

## 2021-08-12 PROCEDURE — 65270000029 HC RM PRIVATE

## 2021-08-12 PROCEDURE — 74011250636 HC RX REV CODE- 250/636: Performed by: NURSE PRACTITIONER

## 2021-08-12 PROCEDURE — 97116 GAIT TRAINING THERAPY: CPT

## 2021-08-12 PROCEDURE — 85027 COMPLETE CBC AUTOMATED: CPT

## 2021-08-12 PROCEDURE — 80048 BASIC METABOLIC PNL TOTAL CA: CPT

## 2021-08-12 PROCEDURE — 97530 THERAPEUTIC ACTIVITIES: CPT

## 2021-08-12 PROCEDURE — 87635 SARS-COV-2 COVID-19 AMP PRB: CPT

## 2021-08-12 PROCEDURE — 74011250637 HC RX REV CODE- 250/637: Performed by: NURSE PRACTITIONER

## 2021-08-12 PROCEDURE — 74011250637 HC RX REV CODE- 250/637: Performed by: INTERNAL MEDICINE

## 2021-08-12 PROCEDURE — 84484 ASSAY OF TROPONIN QUANT: CPT

## 2021-08-12 PROCEDURE — 74011250636 HC RX REV CODE- 250/636: Performed by: PHYSICIAN ASSISTANT

## 2021-08-12 PROCEDURE — 93308 TTE F-UP OR LMTD: CPT

## 2021-08-12 PROCEDURE — 74011250637 HC RX REV CODE- 250/637: Performed by: PHYSICIAN ASSISTANT

## 2021-08-12 PROCEDURE — 85730 THROMBOPLASTIN TIME PARTIAL: CPT

## 2021-08-12 PROCEDURE — 82962 GLUCOSE BLOOD TEST: CPT

## 2021-08-12 RX ORDER — METOPROLOL SUCCINATE 25 MG/1
12.5 TABLET, EXTENDED RELEASE ORAL DAILY
Status: DISCONTINUED | OUTPATIENT
Start: 2021-08-12 | End: 2021-08-13

## 2021-08-12 RX ORDER — DOCUSATE SODIUM 100 MG/1
100 CAPSULE, LIQUID FILLED ORAL 2 TIMES DAILY
Status: DISCONTINUED | OUTPATIENT
Start: 2021-08-12 | End: 2021-08-13 | Stop reason: HOSPADM

## 2021-08-12 RX ORDER — BISACODYL 5 MG
5 TABLET, DELAYED RELEASE (ENTERIC COATED) ORAL DAILY PRN
Status: DISCONTINUED | OUTPATIENT
Start: 2021-08-12 | End: 2021-08-13 | Stop reason: HOSPADM

## 2021-08-12 RX ORDER — HYDRALAZINE HYDROCHLORIDE 20 MG/ML
10 INJECTION INTRAMUSCULAR; INTRAVENOUS
Status: DISCONTINUED | OUTPATIENT
Start: 2021-08-12 | End: 2021-08-13 | Stop reason: HOSPADM

## 2021-08-12 RX ORDER — LOSARTAN POTASSIUM 25 MG/1
25 TABLET ORAL DAILY
Status: DISCONTINUED | OUTPATIENT
Start: 2021-08-12 | End: 2021-08-13

## 2021-08-12 RX ORDER — AMLODIPINE BESYLATE 5 MG/1
10 TABLET ORAL DAILY
Status: DISCONTINUED | OUTPATIENT
Start: 2021-08-13 | End: 2021-08-13 | Stop reason: HOSPADM

## 2021-08-12 RX ADMIN — BISACODYL 5 MG: 5 TABLET, COATED ORAL at 00:28

## 2021-08-12 RX ADMIN — ATORVASTATIN CALCIUM 20 MG: 10 TABLET, FILM COATED ORAL at 21:59

## 2021-08-12 RX ADMIN — Medication 10 ML: at 22:00

## 2021-08-12 RX ADMIN — Medication 10 ML: at 17:22

## 2021-08-12 RX ADMIN — DOCUSATE SODIUM 100 MG: 100 CAPSULE ORAL at 17:12

## 2021-08-12 RX ADMIN — APIXABAN 2.5 MG: 2.5 TABLET, FILM COATED ORAL at 21:59

## 2021-08-12 RX ADMIN — HYDRALAZINE HYDROCHLORIDE 10 MG: 20 INJECTION INTRAMUSCULAR; INTRAVENOUS at 19:41

## 2021-08-12 RX ADMIN — AMLODIPINE BESYLATE 5 MG: 5 TABLET ORAL at 07:57

## 2021-08-12 RX ADMIN — ACETAMINOPHEN 650 MG: 325 TABLET ORAL at 00:28

## 2021-08-12 RX ADMIN — APIXABAN 2.5 MG: 2.5 TABLET, FILM COATED ORAL at 11:42

## 2021-08-12 RX ADMIN — Medication 10 ML: at 05:20

## 2021-08-12 RX ADMIN — HYDRALAZINE HYDROCHLORIDE 10 MG: 20 INJECTION INTRAMUSCULAR; INTRAVENOUS at 04:10

## 2021-08-12 RX ADMIN — LOSARTAN POTASSIUM 25 MG: 25 TABLET, FILM COATED ORAL at 12:05

## 2021-08-12 NOTE — PROGRESS NOTES
Pt will be seen by NP shortly. Discussed case with wife and pt. Recommend snf, they are against but will discuss. Noted dvt, Dc hep drip, start eliquis low dose, told about the dangers of eliquis and falling. Jennifer Beckett opt to proceed with eliquis and use wheelchair.   Informed case mgmt about them deciding on snf vs hh.

## 2021-08-12 NOTE — PROGRESS NOTES
Problem: Mobility Impaired (Adult and Pediatric)  Goal: *Acute Goals and Plan of Care (Insert Text)  Description: Physical Therapy Goals  Initiated 8/11/2021 and to be accomplished within 7 day(s)  1. Patient will move from supine to sit and sit to supine , scoot up and down, and roll side to side in bed with supervision/set-up. 2.  Patient will transfer from bed to chair and chair to bed with supervision/set-up using the least restrictive device. 3.  Patient will perform sit to stand with supervision/set-up. 4.  Patient will ambulate with supervision/set-up for 25 feet with the least restrictive device. 5.  Patient will ascend/descend 4 stairs with 2 handrail(s) with minimal assistance/contact guard assist.    PLOF: Limited community ambulator who used a standard walker and who had assist for ADLs from wife. Outcome: Progressing Towards Goal   PHYSICAL THERAPY TREATMENT    Patient: Edmundo Alonso (59 y.o. male)  Date: 8/12/2021  Diagnosis: Ambulatory dysfunction [R26.2]  Failure to thrive (0-17) [R62.51]  Multiple falls [R29.6]  Troponin I above reference range [R77.8] <principal problem not specified>       Precautions: Fall    ASSESSMENT:  Pt Is pleasant and willing to participate. Pt came to sitting then stood before performing one bout of ambulation. Pt needed V/C for device management and Min A to maintain standing balance. See below for treatment details. Progression toward goals: Progressing towards gait goals. [x]      Improving appropriately and progressing toward goals  []      Improving slowly and progressing toward goals  []      Not making progress toward goals and plan of care will be adjusted     PLAN:  Patient continues to benefit from skilled intervention to address the above impairments. Continue treatment per established plan of care.   Discharge Recommendations: [facility]  Further Equipment Recommendations for Discharge:  N/A     SUBJECTIVE:   Patient stated \"I need to Pee\"    OBJECTIVE DATA SUMMARY:   Critical Behavior:  Neurologic State: Alert  Orientation Level: Disoriented to place, Disoriented to situation, Disoriented to time, Oriented to person  Cognition: Follows commands, Impaired decision making      Functional Mobility Training:  Bed Mobility:     Supine to Sit: Minimum assistance  Sit to Supine: Contact guard assistance  Scooting: Stand-by assistance         Transfers:  Sit to Stand: Minimum assistance  Stand to Sit: Minimum assistance  Posterior LOB need min A to correct. Balance:  Sitting: Impaired  Sitting - Static: Fair (occasional)  Sitting - Dynamic: Fair (occasional)  Standing: Impaired  Standing - Static: Fair  Standing - Dynamic : Poor      Ambulation/Gait Training:  Distance (ft): 60 Feet (ft)  Assistive Device: Walker, rolling  Ambulation - Level of Assistance: Minimal assistance        Gait Abnormalities: Path deviations;Scissoring (poor devicemanagemnet )  Pain:  Pain level pre-treatment: 0/10  Pain level post-treatment: 0/10   Pain Intervention(s): None needed, nursing notified      Activity Tolerance:   Please refer to the flowsheet for vital signs taken during this treatment. After treatment:   [] Patient left in no apparent distress sitting up in chair  [x] Patient left in no apparent distress in bed  [x] Call bell left within reach  [x] Nursing notified  [] Caregiver present  [x] Bed alarm activated  [] SCDs applied      COMMUNICATION/EDUCATION:   [x]         Role of Physical Therapy in the acute care setting. [x]         Fall prevention education was provided and the patient/caregiver indicated understanding. [x]         Patient/family have participated as able in working toward goals and plan of care. [x]         Patient/family agree to work toward stated goals and plan of care. []         Patient understands intent and goals of therapy, but is neutral about his/her participation.   []         Patient is unable to participate in stated goals/plan of care: ongoing with therapy staff.  []         Other:        Johanna Solders, PTA   Time Calculation: 20 mins

## 2021-08-12 NOTE — PROGRESS NOTES
Patient calling out for help. Patient worried about clot  in leg and complaining of severe abdominal pain. Agreed to bath. Small incontinent stool. This nurse had to disimpact patient. PRN dulcolax ordered by Lou REYNOLDS.

## 2021-08-12 NOTE — PROGRESS NOTES
0700- assumed care of patient after receiving report from off going nurse  1000- AM medication given, pt accepted and tolerated well. He was able to get into chair with therapy. 36- Wife at bedside and she is discussing DC plans with patient (SNF care) pt wife is promising the patient she will be with him during his stay and she is tearful. Unfortunately, pt wife also has some learning barriers and thinks the pt is leaving today to go to SNF which was not the plan. Although it has been told to the wife numerous times, she thinks that the pt is leaving today. She brought clothes from home for pt to be changed into prior to DC per wife's wishes. primofit intact and draining urine. Pt is not eating much. No distress noted. CBWR  1300- pt assisted to [facility] and he was able to have a large bowel movement. Assisted back to bed.  CBWR

## 2021-08-12 NOTE — PROGRESS NOTES
Problem: Falls - Risk of  Goal: *Absence of Falls  Description: Document Rizwan Garcia Fall Risk and appropriate interventions in the flowsheet. Outcome: Progressing Towards Goal  Note: Fall Risk Interventions:  Mobility Interventions: Utilize walker, cane, or other assistive device, Bed/chair exit alarm    Mentation Interventions: Adequate sleep, hydration, pain control, Bed/chair exit alarm, Door open when patient unattended         Elimination Interventions: Bed/chair exit alarm, Call light in reach, Toileting schedule/hourly rounds              Problem: Patient Education: Go to Patient Education Activity  Goal: Patient/Family Education  Outcome: Progressing Towards Goal     Problem: Pressure Injury - Risk of  Goal: *Prevention of pressure injury  Description: Document Malcom Scale and appropriate interventions in the flowsheet.   Outcome: Progressing Towards Goal  Note: Pressure Injury Interventions:  Sensory Interventions: Assess changes in LOC    Moisture Interventions: Absorbent underpads    Activity Interventions: Increase time out of bed    Mobility Interventions: HOB 30 degrees or less    Nutrition Interventions: Document food/fluid/supplement intake    Friction and Shear Interventions: HOB 30 degrees or less                Problem: Patient Education: Go to Patient Education Activity  Goal: Patient/Family Education  Outcome: Progressing Towards Goal     Problem: Patient Education: Go to Patient Education Activity  Goal: Patient/Family Education  Outcome: Progressing Towards Goal     Problem: Nutrition Deficit  Goal: *Optimize nutritional status  Outcome: Progressing Towards Goal

## 2021-08-12 NOTE — PROGRESS NOTES
Adamaris Adams         Progress Note    Patient: Calixto Schrader MRN: [id number]  SSN: [id number]    YOB: 1940  Age: [de-identified] y.o. Sex: male      Admit Date: 8/10/2021    LOS: 1 day     Subjective:   Calixto Schrader is a [de-identified]y.o. year-old male with past medical history significant for diabetes, HTN, hyperlipidemia, depression, who was admitted 8/10/21 for complaints of fall. Also found hypotensive in ED, and subsequently with elevated troponin this a.m. Pt was seen and examined today at bedside, no family present. Noted sitting up in chair in no acute distress. He denies chest pain, shortness of breath, fevers or chills. No nausea, vomitting, or diarrhea. Pt states\"belly hurts\" RN states he had been constipated and was started on dulcolax. Rn notes occasional bradycardic episodes. Noted elevated Bps , fluids d/c, bp meds adjusted. Cardio following. Objective:     Vitals:    08/12/21 0735 08/12/21 0800 08/12/21 1122 08/12/21 1200   BP: (!) 206/83  (!) 185/63    Pulse: 65 74 60 (!) 52   Resp: 18  17    Temp: 97.6 °F (36.4 °C)  97.8 °F (36.6 °C)    SpO2: 97%  97%    Weight:       Height:            Intake and Output:  Current Shift: No intake/output data recorded. Last three shifts: 08/10 1901 - 08/12 0700  In: 1619.2 [P.O.:940; I.V.:679.2]  Out: 1501 [Urine:1500]    Physical Exam:   Physical Exam  Vitals and nursing note reviewed. Constitutional:       Appearance: Normal appearance. Comments: Pleasant elderly male oriented x 4, appears stated, well developed, no acute distress   HENT:      Head: Normocephalic and atraumatic. Mouth/Throat:      Mouth: Mucous membranes are moist.   Eyes:      Extraocular Movements: Extraocular movements intact. Pupils: Pupils are equal, round, and reactive to light. Cardiovascular:      Rate and Rhythm: Normal rate and regular rhythm. Pulses: Normal pulses. Heart sounds: Murmur heard.      Pulmonary:      Effort: Pulmonary effort is normal. Comments: scattered crackles in bilateral lobes  Abdominal:      General: Abdomen is flat. Bowel sounds are normal.      Palpations: Abdomen is soft. Musculoskeletal:      Cervical back: Normal range of motion and neck supple. Comments: diminished ROM in bilateral legs, weak strength 3/5 bilaterally,ilya leg tenderness. Skin:     General: Skin is warm and dry. Capillary Refill: Capillary refill takes less than 2 seconds. Comments: Multiple bruising, multiple bilateral lower extremity ulcerated lesions. Neurological:      General: No focal deficit present. Mental Status: He is alert and oriented to person, place, and time. Mental status is at baseline.    Psychiatric:         Mood and Affect: Mood normal.         Behavior: Behavior normal.         Lab/Data Review:  Recent Results (from the past 24 hour(s))   TROPONIN I    Collection Time: 08/11/21  5:00 PM   Result Value Ref Range    Troponin-I, Qt. 0.13 (H) 0.02 - 0.05 ng/mL   GLUCOSE, POC    Collection Time: 08/11/21  5:03 PM   Result Value Ref Range    Glucose (POC) 107 70 - 110 mg/dL    Performed by Antwan Raymundo    PTT    Collection Time: 08/11/21  6:00 PM   Result Value Ref Range    aPTT 40.8 (H) 23.0 - 36.4 sec    aPTT, therapeutic range   82 - 109 sec   GLUCOSE, POC    Collection Time: 08/11/21  7:26 PM   Result Value Ref Range    Glucose (POC) 167 (H) 70 - 110 mg/dL    Performed by Rodrigo Bertrand    TROPONIN I    Collection Time: 08/11/21 10:35 PM   Result Value Ref Range    Troponin-I, Qt. 0.11 (H) 0.02 - 0.05 ng/mL   PTT    Collection Time: 08/12/21  1:29 AM   Result Value Ref Range    aPTT 151.0 (HH) 23.0 - 36.4 sec    aPTT, therapeutic range   82 - 773 sec   METABOLIC PANEL, BASIC    Collection Time: 08/12/21  4:17 AM   Result Value Ref Range    Sodium 139 135 - 145 mmol/L    Potassium 4.1 3.2 - 5.1 mmol/L    Chloride 109 94 - 111 mmol/L    CO2 24 21 - 33 mmol/L    Anion gap 6 mmol/L    Glucose 153 (H) 70 - 110 mg/dL    BUN 36 (H) 9 - 21 mg/dL    Creatinine 1.30 0.8 - 1.50 mg/dL    BUN/Creatinine ratio 28      GFR est AA >60 ml/min/1.73m2    GFR est non-AA 53 ml/min/1.73m2    Calcium 8.5 8.5 - 10.5 mg/dL   TROPONIN I    Collection Time: 08/12/21  4:17 AM   Result Value Ref Range    Troponin-I, Qt. 0.09 (H) 0.02 - 0.05 ng/mL   CBC W/O DIFF    Collection Time: 08/12/21  4:17 AM   Result Value Ref Range    WBC 14.6 (H) 4.6 - 13.2 K/uL    RBC 3.53 (L) 4.70 - 5.50 M/uL    HGB 10.5 (L) 13.0 - 16.0 g/dL    HCT 30.8 (L) 36.0 - 48.0 %    MCV 87.3 74.0 - 97.0 FL    MCH 29.7 24.0 - 34.0 PG    MCHC 34.1 31.0 - 37.0 g/dL    RDW 14.6 (H) 11.6 - 14.5 %    PLATELET 365 852 - 735 K/uL    MPV 11.5 9.2 - 11.8 FL   GLUCOSE, POC    Collection Time: 08/12/21  4:34 AM   Result Value Ref Range    Glucose (POC) 149 (H) 70 - 110 mg/dL    Performed by [address], POC    Collection Time: 08/12/21  7:11 AM   Result Value Ref Range    Glucose (POC) 111 (H) 70 - 110 mg/dL    Performed by Mirna AGUIRRE ADULT FOLLOW-UP OR LIMITED    Collection Time: 08/12/21  7:47 AM   Result Value Ref Range    AV Cusp 1.60 cm    Ao Root D 4.00 cm    AO ASC D 3.60 cm    IVSd 1.30 (A) 0.60 - 1.00 cm    LVIDd 4.60 4.20 - 5.90 cm    LVIDs 3.10 cm    LVOT d 2.20 cm    LVPWd 1.20 (A) 0.60 - 1.00 cm    LV ED Vol A2C 97.30 cm3    LV ES Vol A2C 29.80 cm3    Left Atrium Major Axis 4.10 cm    Left Atrium to Aortic Root Ratio 1.02     BP EF 61.0 55.0 - 100.0 %    LV Mass .4 88.0 - 224.0 g    LV Mass AL Index 130.2 49.0 - 115.0 g/m2    Left Atrium Minor Axis 2.46 cm   GLUCOSE, POC    Collection Time: 08/12/21 11:11 AM   Result Value Ref Range    Glucose (POC) 184 (H) 70 - 110 mg/dL    Performed by Mirna Talamantes        Assessment:     Active Problems:    Failure to thrive (0-17) (8/10/2021)      Multiple falls (8/10/2021)      Ambulatory dysfunction (8/10/2021)      Syncope (8/10/2021)      Hypertension (8/10/2021)      Hypercholesteremia (8/10/2021)      Troponin I above reference range (8/11/2021)        Plan:   Falls:  -hx of multiple falls, and impaired gait.  -UA neg. CXR-cardiomegaly. CT chest unremarkable.  -continue PT/OT eval and treat  -case mgt assisting with d/c planning, after speaking with his wife, they have agreed to placement, we are pending placement. Hypotension:  -resolved. -BP trending up, now  hypertensive with SBPs up to 206, will increase his amlodipine to 10mg daily, and start low dose losartan at 25mg daily. .     Elevated troponin:  -trops-0.05-->0.14-->0.14-->0.13, ECG-non ischemic. No chest pain. -echo 3/2021 shows an EF of 23-00%, diastolic dysfunction.  -cardiology following. Recs for outpt stress test, may need outpt event monitor when d/c for his bradycardic episodes. Acute Right DVT:  -started on heparin drip last night.  -transitioned to eliquis bid,     Gait impairment:  -continue PT/OT eval and treat.  -case mgt for assistance with placement. Vascular wounds:  -chronic issue, wound care daily. -recommend outpt podiatry eval.    Diabetes Mellitus:   -chronic issue, diet controlled. -continue accuchecks achs, SSI prn. Hyperlipidemia:  -chronic issue on statin. Code status: Full  VTE prophylaxis: Eliquis  Plan of care discussed with patient. No family at bedside. Pending placement. Total time spent: 35 minutes.                   Signed By: Senait Lundberg NP     August 12, 2021

## 2021-08-12 NOTE — PROGRESS NOTES
Pt cleaned up from having small hard stool. Wound dressings on legs changed. No other concerns voiced at this time.  CBWR

## 2021-08-12 NOTE — PROGRESS NOTES
conducted an initial consultation and Spiritual Assessment for Glenn Liu, who is a [de-identified] y.o.,male. Patients Primary Language is: [place]. According to the patients EMR Rastafarian Affiliation is: No preference. The reason the Patient came to the hospital is:   Patient Active Problem List    Diagnosis Date Noted    Troponin I above reference range 08/11/2021    Failure to thrive (0-17) 08/10/2021    Multiple falls 08/10/2021    Ambulatory dysfunction 08/10/2021    Syncope 08/10/2021    Hypertension 08/10/2021    Hypercholesteremia 08/10/2021        The  provided the following Interventions:  Initiated a relationship of care and support. Explored issues of jazmin, spirituality and/or Yarsanism needs while hospitalized. Listened empathically. Provided chaplaincy education. Provided information about Spiritual Care Services. Offered prayer and assurance of continued prayers on patient's behalf. Chart reviewed. The following outcomes were achieved:  Patient shared some information about their medical narrative and spiritual journey/beliefs. Patient processed feeling about current hospitalization. Patient expressed gratitude for the 's visit. Assessment:  Patient did not indicate any spiritual or Yarsanism issues which require Spiritual Care Services interventions at this time. Patient does not have any Yarsanism/cultural needs that will affect patients preferences in health care. Plan:  Chaplains will continue to follow and will provide pastoral care on an as needed or requested basis.  recommends bedside caregivers page  on duty if patient shows signs of acute spiritual or emotional distress. Per patient, feeling better but still some uncertainty about his health. Patient seems to be unsure about his health status. Having weakness symptoms. Patient calm and alert,  offered availability and prayer of assurance.     Eloisa Shah Alfonso Alba   Staff [address] [facility]   [phone]

## 2021-08-12 NOTE — PROGRESS NOTES
Problem: Mobility Impaired (Adult and Pediatric)  Goal: *Acute Goals and Plan of Care (Insert Text)  Description: Physical Therapy Goals  Initiated 8/11/2021 and to be accomplished within 7 day(s)  1. Patient will move from supine to sit and sit to supine , scoot up and down, and roll side to side in bed with supervision/set-up. 2.  Patient will transfer from bed to chair and chair to bed with supervision/set-up using the least restrictive device. 3.  Patient will perform sit to stand with supervision/set-up. 4.  Patient will ambulate with supervision/set-up for 25 feet with the least restrictive device. 5.  Patient will ascend/descend 4 stairs with 2 handrail(s) with minimal assistance/contact guard assist.    PLOF: Limited community ambulator who used a standard walker and who had assist for ADLs from wife. Outcome: Progressing Towards Goal   PHYSICAL THERAPY TREATMENT    Patient: Ramón Pedro (21 y.o. male)  Date: 8/12/2021  Diagnosis: Ambulatory dysfunction [R26.2]  Failure to thrive (0-17) [R62.51]  Multiple falls [R29.6]  Troponin I above reference range [R77.8] <principal problem not specified>       Precautions: Fall    ASSESSMENT:  Pt Is pleasant and willing to participate however limited due to abdominal pain. Pt needed 2 attempts to come to sitting noted grimacing with this. After this Pt transfers to chair and is willing to sit up for lunch. Plans to check back PM. See below for treatment details. Progression toward goals: Pt was able to transfer however does to want to ambulate at this time. [x]      Improving appropriately and progressing toward goals  []      Improving slowly and progressing toward goals  []      Not making progress toward goals and plan of care will be adjusted     PLAN:  Patient continues to benefit from skilled intervention to address the above impairments. Continue treatment per established plan of care.   Discharge Recommendations: Skilled Nursing Facility  Further Equipment Recommendations for Discharge:  N/A     SUBJECTIVE:   Patient stated I will walk later when my stomach feels better. OBJECTIVE DATA SUMMARY:   Critical Behavior:  Neurologic State: Alert  Orientation Level: Disoriented to place, Disoriented to situation, Disoriented to time, Oriented to person  Cognition: Follows commands, Impaired decision making     08/11/21 1900   Bed Mobility   Supine to Sit Contact guard assistance   Sit to Supine Stand-by assistance   Scooting Stand-by assistance   Balance   Sitting Intact   Standing Intact   Standing - Static Good   Standing - Dynamic  Fair   Transfers   Sit to Stand Contact guard assistance   Stand to Sit Contact guard assistance   Stand Pivot Transfers Contact guard assistance  (no LOB noted )   Gait   Gait Abnormalities   (Pt declined due to abdominal pain )   Skin Integumentary   Skin Color Red   Skin Condition/Temp Warm;Dry   Skin Integrity Abrasion; Wound (add Wound LDA)     Pain:  Pain level pre-treatment: 8/10  Pain level post-treatment: 8/10   Pain location: Lower abdomin   Pain Intervention(s): None needed, nursing notified      Activity Tolerance:   Please refer to the flowsheet for vital signs taken during this treatment. After treatment:   [x] Patient left in no apparent distress sitting up in chair  [] Patient left in no apparent distress in bed  [x] Call bell left within reach  [x] Nursing notified  [] Caregiver present  [] Bed alarm activated  [] SCDs applied      COMMUNICATION/EDUCATION:   [x]         Role of Physical Therapy in the acute care setting. [x]         Fall prevention education was provided and the patient/caregiver indicated understanding. [x]         Patient/family have participated as able in working toward goals and plan of care. [x]         Patient/family agree to work toward stated goals and plan of care.   []         Patient understands intent and goals of therapy, but is neutral about his/her participation.   []         Patient is unable to participate in stated goals/plan of care: ongoing with therapy staff.  []         Other:        Luba Jameson, HIMANSHU   Time Calculation: 19 mins

## 2021-08-12 NOTE — PROGRESS NOTES
CARDIOLOGY PROGRESS NOTE - NP    Patient seen and examined. This is a patient who is followed for elevated troponins. He denies chest pain, shortness of breath, palpitations, or lightheadedness. He is sleeping at the time of the visit. No other complaints reported. Telemetry reviewed, there were no events noted in the past 24 hours. He is in sinus rhythm in the 70s-80s with first degree AV block. He had an episode of sinus bradycardia in the 30s this afternoon. Pertinent review of systems items noted above, all other systems are negative. Current medications reviewed. PHYSICAL EXAMINATION:  Vital sign assessment reveal a blood pressure of 185/63 and pulse rate of 60. Cardiovascular exam has a heart with a regular rate and rhythm, normal S1 and S2. Soft systolic murmur present. There are no rubs or gallops. Good peripheral pulses. No jugular venous distension. Respiratory exam reveals clear lung fields, no rales or rhonchi. Lymphatic exam reveals no edema. Neurologic exam is nonfocal.      Recent labs results and imaging reviewed.   Notable findings include:   Recent Results (from the past 24 hour(s))   TROPONIN I    Collection Time: 08/11/21  5:00 PM   Result Value Ref Range    Troponin-I, Qt. 0.13 (H) 0.02 - 0.05 ng/mL   GLUCOSE, POC    Collection Time: 08/11/21  5:03 PM   Result Value Ref Range    Glucose (POC) 107 70 - 110 mg/dL    Performed by Latoya Dean    PTT    Collection Time: 08/11/21  6:00 PM   Result Value Ref Range    aPTT 40.8 (H) 23.0 - 36.4 sec    aPTT, therapeutic range   82 - 109 sec   GLUCOSE, POC    Collection Time: 08/11/21  7:26 PM   Result Value Ref Range    Glucose (POC) 167 (H) 70 - 110 mg/dL    Performed by Aneesh Lora    TROPONIN I    Collection Time: 08/11/21 10:35 PM   Result Value Ref Range    Troponin-I, Qt. 0.11 (H) 0.02 - 0.05 ng/mL   PTT    Collection Time: 08/12/21  1:29 AM   Result Value Ref Range    aPTT 151.0 (HH) 23.0 - 36.4 sec    aPTT, therapeutic range   82 - 621 sec   METABOLIC PANEL, BASIC    Collection Time: 08/12/21  4:17 AM   Result Value Ref Range    Sodium 139 135 - 145 mmol/L    Potassium 4.1 3.2 - 5.1 mmol/L    Chloride 109 94 - 111 mmol/L    CO2 24 21 - 33 mmol/L    Anion gap 6 mmol/L    Glucose 153 (H) 70 - 110 mg/dL    BUN 36 (H) 9 - 21 mg/dL    Creatinine 1.30 0.8 - 1.50 mg/dL    BUN/Creatinine ratio 28      GFR est AA >60 ml/min/1.73m2    GFR est non-AA 53 ml/min/1.73m2    Calcium 8.5 8.5 - 10.5 mg/dL   TROPONIN I    Collection Time: 08/12/21  4:17 AM   Result Value Ref Range    Troponin-I, Qt. 0.09 (H) 0.02 - 0.05 ng/mL   CBC W/O DIFF    Collection Time: 08/12/21  4:17 AM   Result Value Ref Range    WBC 14.6 (H) 4.6 - 13.2 K/uL    RBC 3.53 (L) 4.70 - 5.50 M/uL    HGB 10.5 (L) 13.0 - 16.0 g/dL    HCT 30.8 (L) 36.0 - 48.0 %    MCV 87.3 74.0 - 97.0 FL    MCH 29.7 24.0 - 34.0 PG    MCHC 34.1 31.0 - 37.0 g/dL    RDW 14.6 (H) 11.6 - 14.5 %    PLATELET 237 887 - 582 K/uL    MPV 11.5 9.2 - 11.8 FL   GLUCOSE, POC    Collection Time: 08/12/21  4:34 AM   Result Value Ref Range    Glucose (POC) 149 (H) 70 - 110 mg/dL    Performed by [address], POC    Collection Time: 08/12/21  7:11 AM   Result Value Ref Range    Glucose (POC) 111 (H) 70 - 110 mg/dL    Performed by Selma AGUIRRE ADULT FOLLOW-UP OR LIMITED    Collection Time: 08/12/21  7:47 AM   Result Value Ref Range    AV Cusp 1.60 cm    Ao Root D 4.00 cm    AO ASC D 3.60 cm    IVSd 1.30 (A) 0.60 - 1.00 cm    LVIDd 4.60 4.20 - 5.90 cm    LVIDs 3.10 cm    LVOT d 2.20 cm    LVPWd 1.20 (A) 0.60 - 1.00 cm    LV ED Vol A2C 97.30 cm3    LV ES Vol A2C 29.80 cm3    Left Atrium Major Axis 4.10 cm    Left Atrium to Aortic Root Ratio 1.02     BP EF 61.0 55.0 - 100.0 %    LV Mass .4 88.0 - 224.0 g    LV Mass AL Index 130.2 49.0 - 115.0 g/m2    Left Atrium Minor Axis 2.46 cm   GLUCOSE, POC    Collection Time: 08/12/21 11:11 AM   Result Value Ref Range    Glucose (POC) 184 (H) 70 - 110 mg/dL    Performed by [facility] Marlon    COVID-19 RAPID TEST    Collection Time: 08/12/21 12:00 PM   Result Value Ref Range    Specimen source Nasopharyngeal      COVID-19 rapid test Not Detected Not Detected       Discussed case with Dr. Radha Andrews, and our impression and recommendations are as follows:  1. Elevated troponins: Troponins peaked at 0.14 which is non-ACS range, and have trended down. EKGs remain nonischemic. Troponin elevation is likely due to hypotension on presentation. He remains free of chest pain. Aspirin was stopped due to initiation of Eliquis for acute DVT treatment. Will defer metoprolol for now due to bradycardia on telemetry. The patient had a negative nuclear stress test in 2019. Consider repeating as outpatient. 2. Hypertension: Blood pressure is higher today. Continue amlodipine 10 mg daily and losartan 25 mg daily. 3. Hyperlipidemia: Continue statin therapy. 4. Near syncope: Orthostatics were negative. He was hypotensive on arrival to the ER. EKGs and telemetry remain without pauses; first degree AV block noted on telemetry. Chest CTA shows no PE. No aortic stenosis or LVOT obstruction noted on echocardiogram done yesterday. Recommend outpatient event monitoring due to bradycardia on telemetry. Thank you for involving us in the care of this patient. Please do not hesitate to call me or Dr. Shanique Nogueira if additional questions arise.

## 2021-08-13 VITALS
DIASTOLIC BLOOD PRESSURE: 70 MMHG | OXYGEN SATURATION: 97 % | HEART RATE: 69 BPM | WEIGHT: 130 LBS | RESPIRATION RATE: 20 BRPM | BODY MASS INDEX: 20.89 KG/M2 | TEMPERATURE: 97.9 F | HEIGHT: 66 IN | SYSTOLIC BLOOD PRESSURE: 197 MMHG

## 2021-08-13 LAB
ANION GAP SERPL CALC-SCNC: 6 MMOL/L
BUN SERPL-MCNC: 39 MG/DL (ref 9–21)
BUN/CREAT SERPL: 35
CA-I BLD-MCNC: 8.8 MG/DL (ref 8.5–10.5)
CHLORIDE SERPL-SCNC: 110 MMOL/L (ref 94–111)
CO2 SERPL-SCNC: 26 MMOL/L (ref 21–33)
CREAT SERPL-MCNC: 1.1 MG/DL (ref 0.8–1.5)
ERYTHROCYTE [DISTWIDTH] IN BLOOD BY AUTOMATED COUNT: 14.8 % (ref 11.6–14.5)
GLUCOSE BLD STRIP.AUTO-MCNC: 129 MG/DL (ref 70–110)
GLUCOSE SERPL-MCNC: 110 MG/DL (ref 70–110)
HCT VFR BLD AUTO: 31.9 % (ref 36–48)
HGB BLD-MCNC: 10.8 G/DL (ref 13–16)
MCH RBC QN AUTO: 29.8 PG (ref 24–34)
MCHC RBC AUTO-ENTMCNC: 33.9 G/DL (ref 31–37)
MCV RBC AUTO: 88.1 FL (ref 74–97)
PERFORMED BY, TECHID: ABNORMAL
PLATELET # BLD AUTO: 342 K/UL (ref 135–420)
PMV BLD AUTO: 11.3 FL (ref 9.2–11.8)
POTASSIUM SERPL-SCNC: 4.1 MMOL/L (ref 3.2–5.1)
RBC # BLD AUTO: 3.62 M/UL (ref 4.7–5.5)
SODIUM SERPL-SCNC: 142 MMOL/L (ref 135–145)
WBC # BLD AUTO: 13.6 K/UL (ref 4.6–13.2)

## 2021-08-13 PROCEDURE — 97530 THERAPEUTIC ACTIVITIES: CPT

## 2021-08-13 PROCEDURE — 74011250637 HC RX REV CODE- 250/637: Performed by: NURSE PRACTITIONER

## 2021-08-13 PROCEDURE — 36415 COLL VENOUS BLD VENIPUNCTURE: CPT

## 2021-08-13 PROCEDURE — 97116 GAIT TRAINING THERAPY: CPT

## 2021-08-13 PROCEDURE — 80048 BASIC METABOLIC PNL TOTAL CA: CPT

## 2021-08-13 PROCEDURE — 74011250637 HC RX REV CODE- 250/637: Performed by: INTERNAL MEDICINE

## 2021-08-13 PROCEDURE — 82962 GLUCOSE BLOOD TEST: CPT

## 2021-08-13 PROCEDURE — 85027 COMPLETE CBC AUTOMATED: CPT

## 2021-08-13 RX ORDER — AMLODIPINE BESYLATE 10 MG/1
10 TABLET ORAL DAILY
Qty: 30 TABLET | Refills: 0 | Status: SHIPPED | OUTPATIENT
Start: 2021-08-14 | End: 2021-08-19 | Stop reason: SDUPTHER

## 2021-08-13 RX ORDER — LOSARTAN POTASSIUM 25 MG/1
50 TABLET ORAL DAILY
Status: DISCONTINUED | OUTPATIENT
Start: 2021-08-14 | End: 2021-08-13

## 2021-08-13 RX ORDER — LOSARTAN POTASSIUM 25 MG/1
50 TABLET ORAL DAILY
Status: DISCONTINUED | OUTPATIENT
Start: 2021-08-13 | End: 2021-08-13 | Stop reason: HOSPADM

## 2021-08-13 RX ADMIN — DOCUSATE SODIUM 100 MG: 100 CAPSULE ORAL at 08:35

## 2021-08-13 RX ADMIN — LOSARTAN POTASSIUM 25 MG: 25 TABLET, FILM COATED ORAL at 08:35

## 2021-08-13 RX ADMIN — AMLODIPINE BESYLATE 10 MG: 5 TABLET ORAL at 08:35

## 2021-08-13 RX ADMIN — APIXABAN 2.5 MG: 2.5 TABLET, FILM COATED ORAL at 08:35

## 2021-08-13 NOTE — PROGRESS NOTES
Problem: Falls - Risk of  Goal: *Absence of Falls  Description: Document Sam Sites Fall Risk and appropriate interventions in the flowsheet. Outcome: Progressing Towards Goal  Note: Fall Risk Interventions:  Mobility Interventions: Bed/chair exit alarm    Mentation Interventions: Bed/chair exit alarm    Medication Interventions: Bed/chair exit alarm    Elimination Interventions: Bed/chair exit alarm, Call light in reach    History of Falls Interventions: Bed/chair exit alarm         Problem: Patient Education: Go to Patient Education Activity  Goal: Patient/Family Education  Outcome: Progressing Towards Goal     Problem: Pressure Injury - Risk of  Goal: *Prevention of pressure injury  Description: Document Malcom Scale and appropriate interventions in the flowsheet.   Outcome: Progressing Towards Goal  Note: Pressure Injury Interventions:  Sensory Interventions: Keep linens dry and wrinkle-free    Moisture Interventions: Absorbent underpads, Apply protective barrier, creams and emollients, Check for incontinence Q2 hours and as needed, Internal/External urinary devices    Activity Interventions: Increase time out of bed    Mobility Interventions: PT/OT evaluation    Nutrition Interventions: Document food/fluid/supplement intake, Offer support with meals,snacks and hydration    Friction and Shear Interventions: HOB 30 degrees or less                Problem: Patient Education: Go to Patient Education Activity  Goal: Patient/Family Education  Outcome: Progressing Towards Goal     Problem: Patient Education: Go to Patient Education Activity  Goal: Patient/Family Education  Outcome: Progressing Towards Goal     Problem: Nutrition Deficit  Goal: *Optimize nutritional status  Outcome: Progressing Towards Goal

## 2021-08-13 NOTE — PROGRESS NOTES
Shift assessment completed. Resting quietly in bed at this time. Denies c/o pain or discomfort. Increased B/P w/prn Hydralazine administered at this time. Will monitor results. Safety precautions remain in place. Will continue to monitor. CB in reach.

## 2021-08-13 NOTE — PROGRESS NOTES
Bedside shift report completed. White board updated. Denies c/o pain or discomfort at this time. Safety measure in place. Bed low/locked, SRx2, CB and all possessions in reach. Will continue to monitor.

## 2021-08-13 NOTE — ROUTINE PROCESS
0700- assumed care of patient after receiving report. 0900- AM medications given, wife at bedside. Full body assessment completed. Pt denies pain. He states he does not like the food at the hospital and is not eating much. 1001- report given to Rossi Oneill at [facility] here to take pt to [facility]. Pt had a large BM before leaving. Clean brief intact. Pt discharged in stable condition. Attempted to update wife but she did not answer but left message letting her know they were transporting him now.

## 2021-08-13 NOTE — DISCHARGE SUMMARY
Discharge Summary       PATIENT ID: Lexy Knight  MRN: [id number]   YOB: 1940    DATE OF ADMISSION: 8/10/2021  2:04 PM    DATE OF DISCHARGE: 08/13/21    PRIMARY CARE PROVIDER: Jes Dorantes MD     ATTENDING PHYSICIAN: Ervin Chandra MD  DISCHARGING PROVIDER: Ervin Chandra MD        CONSULTATIONS: IP CONSULT TO CARDIOLOGY    PROCEDURES/SURGERIES: * No surgery found *    ADMITTING [address] COURSE:   Lexy Knight is a [de-identified] y.o.  male with a past medical history of hypertension, hypocholesteremia, diabetes (diet-controlled), and dementia, patient presented to the ED with a chief complaint of sustaining a fall. Patient states that he was trying to carry some groceries into the house, and he was not using his walker, he became dizzy and he fell, patient denies blacking out. Patient denies headaches, shortness of breath, chest pain, and palpitations. Wife is no longer at the bedside and patient is a poor historian. While patient was in the ED they attempted to walk him, but patient is having difficulties walking and wife is stating that his becoming difficulty to care for patient alone at home. She says she refused to place him in a nursing home but would like to have some in-home care for her .     In the ED H&H 9.4/26.9, daily negative, 138, BUN 40, albumin 2.9 CT of the head negative for any acute intracranial abnormalities chest x-ray revealed cardiomegaly, retrocardiac density, EKG shows sinus or ectopic atrial rhythm with right bundle branch block and left ventricular hypertrophy in the ED patient received a 1 L fluid bolus.     Admit to telemetry. Patient assessed in the ED, at the bedside, patient is alert and oriented, there is no acute distress noted.  Patient agrees to admission for a diagnosis of syncope of unknown origin, treatment to include IV hydration, cardiac monitoring, orthostatic blood pressure, and PT consult along with case management consult. DISCHARGE DIAGNOSES / PLAN:      Falls:  -hx of multiple falls, and impaired gait.  -UA neg. CXR-cardiomegaly. CT chest unremarkable.  -continue PT/OT eval and treat  -case mgt assisting with d/c planning, after speaking with his wife, they have agreed to placement, we are pending placement.      Hypotension:  -resolved. -BP trending up, now  hypertensive with SBPs up to 206, will increase his amlodipine to 10mg daily, and start low dose losartan at 25mg daily. .      Elevated troponin:  -trops-0.05-->0.14-->0.14-->0.13, ECG-non ischemic. No chest pain. -echo 3/2021 shows an EF of 03-85%, diastolic dysfunction.  -cardiology following. Recs for outpt stress test, may need outpt event monitor when d/c for his bradycardic episodes.     Acute Right DVT:  -started on heparin drip last night.  -transitioned to eliquis bid,      Gait impairment:  -continue PT/OT eval and treat.  -case mgt for assistance with placement.     Vascular wounds:  -chronic issue, wound care daily. -recommend outpt podiatry eval.     Diabetes Mellitus:   -chronic issue, diet controlled. -continue accuchecks achs, SSI prn.     Hyperlipidemia:  -chronic issue on statin. Day of dc, stable for dc to snf        recs per cardio:  Discussed case with Dr. Mary Kruse, and our impression and recommendations are as follows:  1. Elevated troponins: Troponins peaked at 0.14 which is non-ACS range, and have trended down. EKGs remain nonischemic. Troponin elevation is likely due to hypotension on presentation. He remains free of chest pain. Aspirin was stopped due to initiation of Eliquis for acute DVT treatment. Will defer metoprolol for now due to bradycardia on telemetry. The patient had a negative nuclear stress test in 2019. Consider repeating as outpatient.     2. Hypertension: Blood pressure is higher today. Continue amlodipine 10 mg daily and losartan 25 mg daily.       3. Hyperlipidemia: Continue statin therapy.     4. Near syncope: Orthostatics were negative. He was hypotensive on arrival to the ER. EKGs and telemetry remain without pauses; first degree AV block noted on telemetry. Chest CTA shows no PE. No aortic stenosis or LVOT obstruction noted on echocardiogram done yesterday. Recommend outpatient event monitoring due to bradycardia on telemetry. Found DVT, informed pt and family. Warned of dangers of falling while on eliquis. Opted to try and avoid falls and take eliquis  Near syncope from transient hypotension vs bradycardia. Needs continued follow up with his podiatry team  Total dc time 35 mins    FOLLOW UP APPOINTMENTS:    Follow-up Information     Follow up With Specialties Details Why Contact Info    Robert Romero MD [address] [place] [address] [phone]               DIET: diabetic      DISCHARGE MEDICATIONS:  Current Discharge Medication List      START taking these medications    Details   apixaban (ELIQUIS) 2.5 mg tablet Take 1 Tablet by mouth every twelve (12) hours. Qty: 60 Tablet, Refills: 0  Start date: 8/13/2021      amLODIPine (NORVASC) 10 mg tablet Take 1 Tablet by mouth daily. Qty: 30 Tablet, Refills: 0  Start date: 8/14/2021         CONTINUE these medications which have NOT CHANGED    Details   losartan (COZAAR) 100 mg tablet TAKE 1 TABLET BY MOUTH EVERY DAY      chlorthalidone (HYGROTEN) 50 mg tablet TAKE 1 TABLET BY MOUTH EVERY DAY IN THE MORNING WITH FOOD      simvastatin (ZOCOR) 20 mg tablet Take  by mouth nightly. STOP taking these medications       cephALEXin (KEFLEX) 500 mg capsule Comments:   Reason for Stopping:         hydrALAZINE (APRESOLINE) 50 mg tablet Comments:   Reason for Stopping:                 NOTIFY YOUR PHYSICIAN FOR ANY OF THE FOLLOWING:   Fever over 101 degrees for 24 hours. Chest pain, shortness of breath, fever, chills, nausea, vomiting, diarrhea, change in mentation, falling, weakness, bleeding.  Severe pain or pain not relieved by medications. Or, any other signs or symptoms that you may have questions about.     CHRONIC MEDICAL DIAGNOSES:  Problem List as of 8/13/2021 Date Reviewed: 1/23/2017        Codes Class Noted - Resolved    Troponin I above reference range ICD-10-CM: R77.8  ICD-9-CM: 790.6  8/11/2021 - Present        Failure to thrive (0-17) ICD-10-CM: R62.51  ICD-9-CM: 783.41  8/10/2021 - Present        Multiple falls ICD-10-CM: R29.6  ICD-9-CM: V15.88  8/10/2021 - Present        Ambulatory dysfunction ICD-10-CM: R26.2  ICD-9-CM: 719.7  8/10/2021 - Present        Syncope ICD-10-CM: R55  ICD-9-CM: 780.2  8/10/2021 - Present        Hypertension ICD-10-CM: I10  ICD-9-CM: 401.9  8/10/2021 - Present        Hypercholesteremia ICD-10-CM: E78.00  ICD-9-CM: 272.0  8/10/2021 - Present              Greater than 35 minutes were spent with the patient on counseling and coordination of care    Signed:   Richard Love MD  8/13/2021  9:43 AM

## 2021-08-13 NOTE — PROGRESS NOTES
Problem: Mobility Impaired (Adult and Pediatric)  Goal: *Acute Goals and Plan of Care (Insert Text)  Description: Physical Therapy Goals  Initiated 8/11/2021 and to be accomplished within 7 day(s)  1. Patient will move from supine to sit and sit to supine , scoot up and down, and roll side to side in bed with supervision/set-up. 2.  Patient will transfer from bed to chair and chair to bed with supervision/set-up using the least restrictive device. 3.  Patient will perform sit to stand with supervision/set-up. 4.  Patient will ambulate with supervision/set-up for 25 feet with the least restrictive device. 5.  Patient will ascend/descend 4 stairs with 2 handrail(s) with minimal assistance/contact guard assist.    PLOF: Limited community ambulator who used a standard walker and who had assist for ADLs from wife. Outcome: Progressing Towards Goal   PHYSICAL THERAPY TREATMENT    Patient: William Vasquez (34 y.o. male)  Date: 8/13/2021  Diagnosis: Ambulatory dysfunction [R26.2]  Failure to thrive (0-17) [R62.51]  Multiple falls [R29.6]  Troponin I above reference range [R77.8] <principal problem not specified>       Precautions: Fall    ASSESSMENT:  Pt Is pleasant and willing to participate. Pt came to sitting then stood before performing one bout of gait to the bathroom. Pt needed V/C for device management and Min A to maintain standing balance. After toileting Pt ambulated to stretcher for D/C. See below for treatment details. Progression toward goals: Progressing towards gait goals. [x]      Improving appropriately and progressing toward goals  []      Improving slowly and progressing toward goals  []      Not making progress toward goals and plan of care will be adjusted     PLAN:  Patient continues to benefit from skilled intervention to address the above impairments. Continue treatment per established plan of care.   Discharge Recommendations: [address] Recommendations for Discharge:  N/A     SUBJECTIVE:   Patient stated \"I wish I knew why my stomach hurt\"    OBJECTIVE DATA SUMMARY:   Critical Behavior:  Neurologic State: Alert  Orientation Level: Disoriented to situation, Disoriented to time, Oriented to person  Cognition: Decreased attention/concentration, Follows commands      Functional Mobility Training:  Bed Mobility:     Supine to Sit: Minimum assistance     Scooting: Contact guard assistance         Transfers:  Sit to Stand: Contact guard assistance;Minimum assistance  Stand to Sit: Contact guard assistance;Minimum assistance  Posterior LOB need min A to correct. Balance:  Sitting: Intact  Sitting - Static: Fair (occasional)  Sitting - Dynamic: Fair (occasional)  Standing: Impaired  Standing - Static: Fair  Standing - Dynamic : Poor      Ambulation/Gait Training:  Distance (ft): 15 Feet (ft)  Assistive Device: Walker, rolling  Ambulation - Level of Assistance: Minimal assistance        Gait Abnormalities: Path deviations;Scissoring (poor devicemanagemnet )  Pain:  Pain level pre-treatment: ?/10  Pain level post-treatment: ?/10   Lower abdomen. Pt unable to rate pain. Pain Intervention(s): None needed, nursing notified      Activity Tolerance:   Please refer to the flowsheet for vital signs taken during this treatment. After treatment:   [] Patient left in no apparent distress sitting up in chair  [x] Patient left in no apparent distress in bed  [x] Call bell left within reach  [x] Nursing notified  [] Caregiver present  [x] Bed alarm activated  [] SCDs applied      COMMUNICATION/EDUCATION:   [x]         Role of Physical Therapy in the acute care setting. [x]         Fall prevention education was provided and the patient/caregiver indicated understanding. [x]         Patient/family have participated as able in working toward goals and plan of care. [x]         Patient/family agree to work toward stated goals and plan of care.   []         Patient understands intent and goals of therapy, but is neutral about his/her participation.   []         Patient is unable to participate in stated goals/plan of care: ongoing with therapy staff.  []         Other:        Paul Hollingsworth, PTA   Time Calculation: 34 mins

## 2021-08-13 NOTE — PROGRESS NOTES
Rounding and VS completed. Asleep but easily aroused. Denies c/o pain or discomfort. Will continue to monitor. CB in reach.

## 2021-08-13 NOTE — PROGRESS NOTES
Medication pass completed. Snack offered and accepted. Denies c/o pain or discomfort. Will continue to monitor. CB in reach.

## 2021-08-13 NOTE — PROGRESS NOTES
Problem: Falls - Risk of  Goal: *Absence of Falls  Description: Document Naz Chris Fall Risk and appropriate interventions in the flowsheet. 8/13/2021 0951 by Dio La LPN  Outcome: Resolved/Met  Note: Fall Risk Interventions:  Mobility Interventions: Bed/chair exit alarm    Mentation Interventions: Bed/chair exit alarm    Medication Interventions: Bed/chair exit alarm    Elimination Interventions: Bed/chair exit alarm, Call light in reach    History of Falls Interventions: Bed/chair exit alarm      8/13/2021 0930 by Dio La LPN  Outcome: Progressing Towards Goal  Note: Fall Risk Interventions:  Mobility Interventions: Bed/chair exit alarm    Mentation Interventions: Bed/chair exit alarm    Medication Interventions: Bed/chair exit alarm    Elimination Interventions: Bed/chair exit alarm, Call light in reach    History of Falls Interventions: Bed/chair exit alarm         Problem: Patient Education: Go to Patient Education Activity  Goal: Patient/Family Education  8/13/2021 0951 by Dio La LPN  Outcome: Resolved/Met  8/13/2021 0930 by Dio La LPN  Outcome: Progressing Towards Goal     Problem: Pressure Injury - Risk of  Goal: *Prevention of pressure injury  Description: Document Malcom Scale and appropriate interventions in the flowsheet.   8/13/2021 0951 by Dio La LPN  Outcome: Resolved/Met  Note: Pressure Injury Interventions:  Sensory Interventions: Keep linens dry and wrinkle-free    Moisture Interventions: Absorbent underpads, Apply protective barrier, creams and emollients, Check for incontinence Q2 hours and as needed, Internal/External urinary devices    Activity Interventions: Increase time out of bed    Mobility Interventions: PT/OT evaluation    Nutrition Interventions: Document food/fluid/supplement intake, Offer support with meals,snacks and hydration    Friction and Shear Interventions: HOB 30 degrees or less             8/13/2021 0930 by Dio La LPN  Outcome: Progressing Towards Goal  Note: Pressure Injury Interventions:  Sensory Interventions: Keep linens dry and wrinkle-free    Moisture Interventions: Absorbent underpads, Apply protective barrier, creams and emollients, Check for incontinence Q2 hours and as needed, Internal/External urinary devices    Activity Interventions: Increase time out of bed    Mobility Interventions: PT/OT evaluation    Nutrition Interventions: Document food/fluid/supplement intake, Offer support with meals,snacks and hydration    Friction and Shear Interventions: HOB 30 degrees or less                Problem: Patient Education: Go to Patient Education Activity  Goal: Patient/Family Education  8/13/2021 0951 by Arturo Wade LPN  Outcome: Resolved/Met  8/13/2021 0930 by Arturo Wade LPN  Outcome: Progressing Towards Goal     Problem: Patient Education: Go to Patient Education Activity  Goal: Patient/Family Education  8/13/2021 0951 by Arturo Wade LPN  Outcome: Resolved/Met  8/13/2021 0930 by Arturo Wade LPN  Outcome: Progressing Towards Goal     Problem: Nutrition Deficit  Goal: *Optimize nutritional status  8/13/2021 0951 by Arturo Wade LPN  Outcome: Resolved/Met  8/13/2021 0930 by Arturo Wade LPN  Outcome: Progressing Towards Goal

## 2021-08-19 RX ORDER — CHLORTHALIDONE 50 MG/1
50 TABLET ORAL DAILY
Qty: 30 TABLET | Refills: 1 | Status: SHIPPED | OUTPATIENT
Start: 2021-08-19 | End: 2021-09-04

## 2021-08-19 RX ORDER — AMLODIPINE BESYLATE 10 MG/1
10 TABLET ORAL DAILY
Qty: 30 TABLET | Refills: 0 | Status: ON HOLD | OUTPATIENT
Start: 2021-08-19 | End: 2021-08-27

## 2021-08-19 RX ORDER — SIMVASTATIN 20 MG/1
20 TABLET, FILM COATED ORAL
Qty: 30 TABLET | Refills: 1 | Status: SHIPPED | OUTPATIENT
Start: 2021-08-19

## 2021-08-19 RX ORDER — LOSARTAN POTASSIUM 100 MG/1
100 TABLET ORAL DAILY
Qty: 30 TABLET | Refills: 1 | Status: SHIPPED | OUTPATIENT
Start: 2021-08-19 | End: 2021-10-07

## 2021-08-27 ENCOUNTER — APPOINTMENT (OUTPATIENT)
Dept: GENERAL RADIOLOGY | Age: 81
DRG: 871 | End: 2021-08-27
Attending: EMERGENCY MEDICINE
Payer: MEDICARE

## 2021-08-27 ENCOUNTER — APPOINTMENT (OUTPATIENT)
Dept: CT IMAGING | Age: 81
DRG: 871 | End: 2021-08-27
Attending: EMERGENCY MEDICINE
Payer: MEDICARE

## 2021-08-27 ENCOUNTER — HOSPITAL ENCOUNTER (INPATIENT)
Age: 81
LOS: 8 days | Discharge: HOSPICE | DRG: 871 | End: 2021-09-04
Attending: EMERGENCY MEDICINE | Admitting: INTERNAL MEDICINE
Payer: MEDICARE
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
## 2021-08-27 DIAGNOSIS — S40.022A CONTUSION OF LEFT UPPER EXTREMITY, INITIAL ENCOUNTER: ICD-10-CM

## 2021-08-27 DIAGNOSIS — N39.0 URINARY TRACT INFECTION WITHOUT HEMATURIA, SITE UNSPECIFIED: ICD-10-CM

## 2021-08-27 DIAGNOSIS — T68.XXXA HYPOTHERMIA, INITIAL ENCOUNTER: ICD-10-CM

## 2021-08-27 DIAGNOSIS — R62.51 FAILURE TO THRIVE (0-17): Primary | ICD-10-CM

## 2021-08-27 DIAGNOSIS — R00.1 BRADYCARDIA: ICD-10-CM

## 2021-08-27 PROBLEM — R65.21 SEPTIC SHOCK DUE TO URINARY TRACT INFECTION (HCC): Status: ACTIVE | Noted: 2021-08-27

## 2021-08-27 PROBLEM — A41.9 SEPTIC SHOCK DUE TO URINARY TRACT INFECTION (HCC): Status: ACTIVE | Noted: 2021-08-27

## 2021-08-27 LAB
ANION GAP SERPL CALC-SCNC: 14 MMOL/L
APPEARANCE UR: ABNORMAL
BACTERIA URNS QL MICRO: ABNORMAL /HPF
BASOPHILS # BLD: 0 K/UL (ref 0–0.1)
BASOPHILS NFR BLD: 0 % (ref 0–2)
BILIRUB UR QL: NEGATIVE
BUN SERPL-MCNC: 140 MG/DL (ref 9–21)
BUN/CREAT SERPL: 74
CA-I BLD-MCNC: 9.4 MG/DL (ref 8.5–10.5)
CHLORIDE SERPL-SCNC: 107 MMOL/L (ref 94–111)
CK SERPL-CCNC: 116 U/L (ref 38–174)
CO2 SERPL-SCNC: 25 MMOL/L (ref 21–33)
COLOR UR: YELLOW
COVID-19 RAPID TEST, COVR: NOT DETECTED
CREAT SERPL-MCNC: 1.9 MG/DL (ref 0.8–1.5)
DIFFERENTIAL METHOD BLD: ABNORMAL
EOSINOPHIL # BLD: 0 K/UL (ref 0–0.4)
EOSINOPHIL NFR BLD: 0 % (ref 0–5)
EPITH CASTS URNS QL MICRO: ABNORMAL /LPF (ref 0–20)
ERYTHROCYTE [DISTWIDTH] IN BLOOD BY AUTOMATED COUNT: 14.1 % (ref 11.6–14.5)
EST. AVERAGE GLUCOSE BLD GHB EST-MCNC: 105 MG/DL
GLUCOSE BLD STRIP.AUTO-MCNC: 62 MG/DL (ref 70–110)
GLUCOSE BLD STRIP.AUTO-MCNC: 67 MG/DL (ref 70–110)
GLUCOSE BLD STRIP.AUTO-MCNC: 74 MG/DL (ref 70–110)
GLUCOSE BLD STRIP.AUTO-MCNC: 81 MG/DL (ref 70–110)
GLUCOSE SERPL-MCNC: 271 MG/DL (ref 70–110)
GLUCOSE UR STRIP.AUTO-MCNC: NEGATIVE MG/DL
HBA1C MFR BLD: 5.3 % (ref 4.2–5.6)
HCT VFR BLD AUTO: 31 % (ref 36–48)
HGB BLD-MCNC: 10.8 G/DL (ref 13–16)
HGB UR QL STRIP: ABNORMAL
IMM GRANULOCYTES # BLD AUTO: 0.1 K/UL (ref 0–0.04)
IMM GRANULOCYTES NFR BLD AUTO: 1 % (ref 0–0.5)
KETONES UR QL STRIP.AUTO: NEGATIVE MG/DL
LACTATE SERPL-SCNC: 1.3 MMOL/L (ref 0.5–2)
LEUKOCYTE ESTERASE UR QL STRIP.AUTO: ABNORMAL
LYMPHOCYTES # BLD: 0.5 K/UL (ref 0.9–3.6)
LYMPHOCYTES NFR BLD: 4 % (ref 21–52)
MAGNESIUM SERPL-MCNC: 3.1 MG/DL (ref 1.7–2.8)
MCH RBC QN AUTO: 29.9 PG (ref 24–34)
MCHC RBC AUTO-ENTMCNC: 34.8 G/DL (ref 31–37)
MCV RBC AUTO: 85.9 FL (ref 78–100)
MONOCYTES # BLD: 0.4 K/UL (ref 0.05–1.2)
MONOCYTES NFR BLD: 4 % (ref 3–10)
NEUTS SEG # BLD: 11 K/UL (ref 1.8–8)
NEUTS SEG NFR BLD: 91 % (ref 40–73)
NITRITE UR QL STRIP.AUTO: NEGATIVE
NRBC # BLD: 0 K/UL (ref 0–0.01)
NRBC BLD-RTO: 0 PER 100 WBC
PERFORMED BY, TECHID: ABNORMAL
PERFORMED BY, TECHID: ABNORMAL
PERFORMED BY, TECHID: NORMAL
PERFORMED BY, TECHID: NORMAL
PH UR STRIP: 5 [PH] (ref 5–8)
PLATELET # BLD AUTO: 582 K/UL (ref 135–420)
PMV BLD AUTO: 11.8 FL (ref 9.2–11.8)
POTASSIUM SERPL-SCNC: 3.3 MMOL/L (ref 3.2–5.1)
PROT UR STRIP-MCNC: 100 MG/DL
RBC # BLD AUTO: 3.61 M/UL (ref 4.35–5.65)
RBC #/AREA URNS HPF: ABNORMAL /HPF (ref 0–2)
SARS-COV-2, COV2: NORMAL
SODIUM SERPL-SCNC: 146 MMOL/L (ref 135–145)
SP GR UR REFRACTOMETRY: 1.02 (ref 1–1.03)
SPECIMEN SOURCE: NORMAL
TROPONIN I SERPL-MCNC: 0.05 NG/ML (ref 0.02–0.05)
UROBILINOGEN UR QL STRIP.AUTO: 0.2 EU/DL (ref 0.2–1)
WBC # BLD AUTO: 12 K/UL (ref 4.6–13.2)
WBC URNS QL MICRO: ABNORMAL /HPF (ref 0–4)

## 2021-08-27 PROCEDURE — 73060 X-RAY EXAM OF HUMERUS: CPT

## 2021-08-27 PROCEDURE — 02HV33Z INSERTION OF INFUSION DEVICE INTO SUPERIOR VENA CAVA, PERCUTANEOUS APPROACH: ICD-10-PCS | Performed by: INTERNAL MEDICINE

## 2021-08-27 PROCEDURE — 74011250636 HC RX REV CODE- 250/636: Performed by: INTERNAL MEDICINE

## 2021-08-27 PROCEDURE — 99285 EMERGENCY DEPT VISIT HI MDM: CPT

## 2021-08-27 PROCEDURE — 74011250637 HC RX REV CODE- 250/637: Performed by: NURSE PRACTITIONER

## 2021-08-27 PROCEDURE — 71045 X-RAY EXAM CHEST 1 VIEW: CPT

## 2021-08-27 PROCEDURE — 96361 HYDRATE IV INFUSION ADD-ON: CPT

## 2021-08-27 PROCEDURE — 81001 URINALYSIS AUTO W/SCOPE: CPT

## 2021-08-27 PROCEDURE — 74011250636 HC RX REV CODE- 250/636: Performed by: NURSE PRACTITIONER

## 2021-08-27 PROCEDURE — 65610000006 HC RM INTENSIVE CARE

## 2021-08-27 PROCEDURE — 74011000250 HC RX REV CODE- 250: Performed by: NURSE PRACTITIONER

## 2021-08-27 PROCEDURE — 85025 COMPLETE CBC W/AUTO DIFF WBC: CPT

## 2021-08-27 PROCEDURE — 87086 URINE CULTURE/COLONY COUNT: CPT

## 2021-08-27 PROCEDURE — 74011000258 HC RX REV CODE- 258: Performed by: NURSE PRACTITIONER

## 2021-08-27 PROCEDURE — 74011000258 HC RX REV CODE- 258: Performed by: INTERNAL MEDICINE

## 2021-08-27 PROCEDURE — 74011000250 HC RX REV CODE- 250: Performed by: INTERNAL MEDICINE

## 2021-08-27 PROCEDURE — 83036 HEMOGLOBIN GLYCOSYLATED A1C: CPT

## 2021-08-27 PROCEDURE — 84484 ASSAY OF TROPONIN QUANT: CPT

## 2021-08-27 PROCEDURE — 99284 EMERGENCY DEPT VISIT MOD MDM: CPT

## 2021-08-27 PROCEDURE — 82962 GLUCOSE BLOOD TEST: CPT

## 2021-08-27 PROCEDURE — 87635 SARS-COV-2 COVID-19 AMP PRB: CPT

## 2021-08-27 PROCEDURE — U0003 INFECTIOUS AGENT DETECTION BY NUCLEIC ACID (DNA OR RNA); SEVERE ACUTE RESPIRATORY SYNDROME CORONAVIRUS 2 (SARS-COV-2) (CORONAVIRUS DISEASE [COVID-19]), AMPLIFIED PROBE TECHNIQUE, MAKING USE OF HIGH THROUGHPUT TECHNOLOGIES AS DESCRIBED BY CMS-2020-01-R: HCPCS

## 2021-08-27 PROCEDURE — 74011000258 HC RX REV CODE- 258: Performed by: EMERGENCY MEDICINE

## 2021-08-27 PROCEDURE — 74011250636 HC RX REV CODE- 250/636: Performed by: EMERGENCY MEDICINE

## 2021-08-27 PROCEDURE — 96360 HYDRATION IV INFUSION INIT: CPT

## 2021-08-27 PROCEDURE — 73090 X-RAY EXAM OF FOREARM: CPT

## 2021-08-27 PROCEDURE — 70450 CT HEAD/BRAIN W/O DYE: CPT

## 2021-08-27 PROCEDURE — 75810000455 HC PLCMT CENT VENOUS CATH LVL 2 5182

## 2021-08-27 PROCEDURE — 93005 ELECTROCARDIOGRAM TRACING: CPT

## 2021-08-27 PROCEDURE — 83735 ASSAY OF MAGNESIUM: CPT

## 2021-08-27 PROCEDURE — 87040 BLOOD CULTURE FOR BACTERIA: CPT

## 2021-08-27 PROCEDURE — 82550 ASSAY OF CK (CPK): CPT

## 2021-08-27 PROCEDURE — 80048 BASIC METABOLIC PNL TOTAL CA: CPT

## 2021-08-27 PROCEDURE — 83605 ASSAY OF LACTIC ACID: CPT

## 2021-08-27 PROCEDURE — 51702 INSERT TEMP BLADDER CATH: CPT

## 2021-08-27 RX ORDER — SODIUM CHLORIDE 9 MG/ML
500 INJECTION, SOLUTION INTRAVENOUS ONCE
Status: COMPLETED | OUTPATIENT
Start: 2021-08-27 | End: 2021-08-27

## 2021-08-27 RX ORDER — ONDANSETRON 2 MG/ML
4 INJECTION INTRAMUSCULAR; INTRAVENOUS
Status: DISCONTINUED | OUTPATIENT
Start: 2021-08-27 | End: 2021-09-04 | Stop reason: HOSPADM

## 2021-08-27 RX ORDER — SODIUM CHLORIDE 0.9 % (FLUSH) 0.9 %
5-40 SYRINGE (ML) INJECTION AS NEEDED
Status: DISCONTINUED | OUTPATIENT
Start: 2021-08-27 | End: 2021-09-04 | Stop reason: HOSPADM

## 2021-08-27 RX ORDER — SODIUM CHLORIDE 0.9 % (FLUSH) 0.9 %
5-40 SYRINGE (ML) INJECTION EVERY 8 HOURS
Status: DISCONTINUED | OUTPATIENT
Start: 2021-08-27 | End: 2021-09-04 | Stop reason: HOSPADM

## 2021-08-27 RX ORDER — MIDODRINE HYDROCHLORIDE 5 MG/1
TABLET ORAL
Status: DISCONTINUED
Start: 2021-08-27 | End: 2021-08-27 | Stop reason: WASHOUT

## 2021-08-27 RX ORDER — ACETAMINOPHEN 650 MG/1
650 SUPPOSITORY RECTAL
Status: DISCONTINUED | OUTPATIENT
Start: 2021-08-27 | End: 2021-09-04 | Stop reason: HOSPADM

## 2021-08-27 RX ORDER — MAGNESIUM SULFATE 100 %
4 CRYSTALS MISCELLANEOUS AS NEEDED
Status: DISCONTINUED | OUTPATIENT
Start: 2021-08-27 | End: 2021-08-27

## 2021-08-27 RX ORDER — UREA 10 %
100 LOTION (ML) TOPICAL DAILY
COMMUNITY

## 2021-08-27 RX ORDER — SODIUM CHLORIDE 0.9 % (FLUSH) 0.9 %
5-10 SYRINGE (ML) INJECTION AS NEEDED
Status: DISCONTINUED | OUTPATIENT
Start: 2021-08-27 | End: 2021-09-04 | Stop reason: HOSPADM

## 2021-08-27 RX ORDER — DOPAMINE HYDROCHLORIDE 160 MG/100ML
0-20 INJECTION, SOLUTION INTRAVENOUS
Status: DISCONTINUED | OUTPATIENT
Start: 2021-08-27 | End: 2021-08-27

## 2021-08-27 RX ORDER — SODIUM CHLORIDE 9 MG/ML
100 INJECTION, SOLUTION INTRAVENOUS CONTINUOUS
Status: DISCONTINUED | OUTPATIENT
Start: 2021-08-27 | End: 2021-08-28

## 2021-08-27 RX ORDER — ONDANSETRON 4 MG/1
4 TABLET, ORALLY DISINTEGRATING ORAL
Status: DISCONTINUED | OUTPATIENT
Start: 2021-08-27 | End: 2021-09-04 | Stop reason: HOSPADM

## 2021-08-27 RX ORDER — DEXTROSE 50 % IN WATER (D50W) INTRAVENOUS SYRINGE
25-50 AS NEEDED
Status: DISCONTINUED | OUTPATIENT
Start: 2021-08-27 | End: 2021-09-04 | Stop reason: HOSPADM

## 2021-08-27 RX ORDER — LEVOFLOXACIN 5 MG/ML
750 INJECTION, SOLUTION INTRAVENOUS
Status: DISCONTINUED | OUTPATIENT
Start: 2021-08-27 | End: 2021-08-28

## 2021-08-27 RX ORDER — ACETAMINOPHEN 325 MG/1
650 TABLET ORAL
Status: DISCONTINUED | OUTPATIENT
Start: 2021-08-27 | End: 2021-09-04 | Stop reason: HOSPADM

## 2021-08-27 RX ORDER — INSULIN LISPRO 100 [IU]/ML
INJECTION, SOLUTION INTRAVENOUS; SUBCUTANEOUS
Status: DISCONTINUED | OUTPATIENT
Start: 2021-08-27 | End: 2021-09-04 | Stop reason: HOSPADM

## 2021-08-27 RX ORDER — INSULIN LISPRO 100 [IU]/ML
INJECTION, SOLUTION INTRAVENOUS; SUBCUTANEOUS
Status: DISCONTINUED | OUTPATIENT
Start: 2021-08-27 | End: 2021-08-27

## 2021-08-27 RX ORDER — MAGNESIUM SULFATE 100 %
4 CRYSTALS MISCELLANEOUS AS NEEDED
Status: DISCONTINUED | OUTPATIENT
Start: 2021-08-27 | End: 2021-09-04 | Stop reason: HOSPADM

## 2021-08-27 RX ORDER — SODIUM CHLORIDE 450 MG/100ML
100 INJECTION, SOLUTION INTRAVENOUS CONTINUOUS
Status: DISCONTINUED | OUTPATIENT
Start: 2021-08-27 | End: 2021-08-27

## 2021-08-27 RX ORDER — HYDRALAZINE HYDROCHLORIDE 50 MG/1
50 TABLET, FILM COATED ORAL 2 TIMES DAILY
COMMUNITY
End: 2021-09-04

## 2021-08-27 RX ORDER — POLYETHYLENE GLYCOL 3350 17 G/17G
17 POWDER, FOR SOLUTION ORAL DAILY PRN
Status: DISCONTINUED | OUTPATIENT
Start: 2021-08-27 | End: 2021-09-04 | Stop reason: HOSPADM

## 2021-08-27 RX ORDER — LEVOFLOXACIN 5 MG/ML
750 INJECTION, SOLUTION INTRAVENOUS EVERY 24 HOURS
Status: DISCONTINUED | OUTPATIENT
Start: 2021-08-27 | End: 2021-08-27 | Stop reason: SDUPTHER

## 2021-08-27 RX ORDER — GLUCOSAMINE SULFATE 1500 MG
1000 POWDER IN PACKET (EA) ORAL DAILY
COMMUNITY

## 2021-08-27 RX ADMIN — CEFTRIAXONE 1 G: 1 INJECTION, POWDER, FOR SOLUTION INTRAMUSCULAR; INTRAVENOUS at 04:37

## 2021-08-27 RX ADMIN — CEFEPIME HYDROCHLORIDE 1 G: 1 INJECTION, POWDER, FOR SOLUTION INTRAMUSCULAR; INTRAVENOUS at 06:40

## 2021-08-27 RX ADMIN — Medication 10 ML: at 22:24

## 2021-08-27 RX ADMIN — SODIUM CHLORIDE 100 ML/HR: 9 INJECTION, SOLUTION INTRAVENOUS at 17:16

## 2021-08-27 RX ADMIN — SODIUM CHLORIDE 100 ML/HR: 4.5 INJECTION, SOLUTION INTRAVENOUS at 06:37

## 2021-08-27 RX ADMIN — SODIUM CHLORIDE 100 ML/HR: 9 INJECTION, SOLUTION INTRAVENOUS at 08:29

## 2021-08-27 RX ADMIN — SODIUM CHLORIDE 1000 ML: 9 INJECTION, SOLUTION INTRAVENOUS at 09:13

## 2021-08-27 RX ADMIN — NOREPINEPHRINE BITARTRATE 3 MCG/MIN: 1 INJECTION, SOLUTION, CONCENTRATE INTRAVENOUS at 23:50

## 2021-08-27 RX ADMIN — DOPAMINE HYDROCHLORIDE IN DEXTROSE 5 MCG/KG/MIN: 1.6 INJECTION, SOLUTION INTRAVENOUS at 06:11

## 2021-08-27 RX ADMIN — SODIUM CHLORIDE 500 ML: 9 INJECTION, SOLUTION INTRAVENOUS at 04:49

## 2021-08-27 RX ADMIN — SODIUM CHLORIDE 1000 ML: 9 INJECTION, SOLUTION INTRAVENOUS at 08:29

## 2021-08-27 RX ADMIN — APIXABAN 2.5 MG: 2.5 TABLET, FILM COATED ORAL at 22:24

## 2021-08-27 RX ADMIN — CEFTRIAXONE 1 G: 1 INJECTION, POWDER, FOR SOLUTION INTRAMUSCULAR; INTRAVENOUS at 04:38

## 2021-08-27 RX ADMIN — APIXABAN 2.5 MG: 2.5 TABLET, FILM COATED ORAL at 13:14

## 2021-08-27 RX ADMIN — NOREPINEPHRINE BITARTRATE 4 MCG/MIN: 1 INJECTION, SOLUTION, CONCENTRATE INTRAVENOUS at 22:33

## 2021-08-27 RX ADMIN — SODIUM CHLORIDE 500 ML: 9 INJECTION, SOLUTION INTRAVENOUS at 02:00

## 2021-08-27 RX ADMIN — LEVOFLOXACIN 750 MG: 5 INJECTION, SOLUTION INTRAVENOUS at 06:40

## 2021-08-27 RX ADMIN — NOREPINEPHRINE BITARTRATE 4 MCG/MIN: 1 INJECTION, SOLUTION, CONCENTRATE INTRAVENOUS at 09:14

## 2021-08-27 RX ADMIN — Medication 10 ML: at 13:38

## 2021-08-27 NOTE — Clinical Note
Status[de-identified] INPATIENT [101]   Type of Bed: Intensive Care [6]   Inpatient Hospitalization Certified Necessary for the Following Reasons: 4.  Patient requires ICU level of care interventions (further clarification in H&P documentation)   Admitting Diagnosis: Hypothermia [447736]   Admitting Diagnosis: Bradycardia [656337]   Admitting Diagnosis: UTI (urinary tract infection) [124232]   Admitting Diagnosis: Fall [[id number]]   Admitting Physician: Janki Cardenas [[id number]]   Attending Physician: Janki Cardenas [[id number]]   Estimated Length of Stay: 2 Midnights   Discharge Plan[de-identified] Home with Office Follow-up

## 2021-08-27 NOTE — CONSULTS
[address] [address] Report    NAME:  Teena Owens  SEX:   male  ADMIT: 2021  DATE OF CONSULT:2021  REFERRING PHYSICIAN:  Lucy Lopez MD  : [id number]  MR#    [id number]  ROOM: [facility]  [facility]#  [de-identified]    CONSULTING PHYSICIAN:  Jordan Chavarria MD    REASON FOR CONSULTATION:  Septic Shock    HISTORY OF PRESENT ILLNESS:  Teena Owens is a [de-identified] y.o. male with a medical history of dementia, T2DM, HTN, recent DVT who presented to [facility] ED with c/o fall per reports. The patient is unable to provide information 2/2 dementia and wife is not present at time of evaluation. According to ED report, the patient fell at home and was soon brought to the ED for evaluation.          REVIEW OF SYSTEMS:  Unable to obtain due to patients current disposition      PAST MEDICAL HISTORY:  Past Medical History:   Diagnosis Date    Abnormality of gait     Benign neoplasm of colon     Depression     DM (diabetes mellitus) ([facility] [address]     Headache     Hypertension     Nonspecific (abnormal) findings on radiological and other examination of genitourinary organs     Other and unspecified hyperlipidemia     Personal history of fall     Right bundle branch block and left anterior fascicular block    .     PAST SURGICAL HISTORY:  Past Surgical History:   Procedure Laterality Date    COLONOSCOPY N/A 3/5/2018    COLONOSCOPY w/ biopsies w/ polypectomy performed by Faith Escobar [facility] at [facility] ENDOSCOPY    HX CATARACT REMOVAL      HX CHOLECYSTECTOMY      HX COLONOSCOPY  3/12/2015    HX TONSILLECTOMY         SOCIAL HISTORY:  Social History     Socioeconomic History    Marital status:      Spouse name: Not on file    Number of children: Not on file    Years of education: Not on file    Highest education level: Not on file   Occupational History    Not on file   Tobacco Use    Smoking status: Former Smoker     Types: Cigarettes    Smokeless tobacco: Never Used    Tobacco comment: Quit 40 years ago    Substance and Sexual Activity    Alcohol use: Not Currently     Alcohol/week: 0.0 standard drinks    Drug use: No    Sexual activity: Not on file   Other Topics Concern    Not on file   Social History Narrative    Not on file     Social Determinants of Health     Financial Resource Strain:     Difficulty of Paying Living Expenses:    Food Insecurity:     Worried About Running Out of Food in the Last Year:     [address] in the Last Year:    Transportation Needs:     Lack of Transportation (Medical):  Lack of Transportation (Non-Medical):    Physical Activity:     Days of Exercise per Week:     Minutes of Exercise per Session:    Stress:     Feeling of Stress :    Social Connections:     Frequency of Communication with Friends and Family:     Frequency of Social Gatherings with Friends and Family:     Attends [employer] Services:     Active Member of Clubs or Organizations:     Attends Club or Organization Meetings:     Marital Status:    Intimate Partner Violence:     Fear of Current or Ex-Partner:     Emotionally Abused:     Physically Abused:     Sexually Abused:        FAMILY HISTORY:  Family History   Problem Relation Age of Onset    Diabetes Mother     Alzheimer Mother     Cancer Father         Lung Cancer       ALLERGIES:  Allergies   Allergen Reactions    Oxycontin [Oxycodone] Other (comments)     hallucinations    Vicodin [Hydrocodone-Acetaminophen] Other (comments)       MEDICATIONS:  Prior to Admission Medications   Prescriptions Last Dose Informant Patient Reported? Taking? amLODIPine (NORVASC) 10 mg tablet   No No   Sig: Take 1 Tablet by mouth daily. apixaban (ELIQUIS) 2.5 mg tablet   No No   Sig: Take 1 Tablet by mouth every twelve (12) hours. chlorthalidone (HYGROTEN) 50 mg tablet   No No   Sig: Take 1 Tablet by mouth daily. losartan (COZAAR) 100 mg tablet   No No   Sig: Take 1 Tablet by mouth daily.    simvastatin (ZOCOR) 20 mg tablet   No No   Sig: Take 1 Tablet by mouth nightly.       Facility-Administered Medications: None     Current Facility-Administered Medications   Medication Dose Route Frequency    DOPamine (INTROPIN) 400 mg in dextrose 5% 250 mL infusion  0-20 mcg/kg/min IntraVENous TITRATE    sodium chloride (NS) flush 5-10 mL  5-10 mL IntraVENous PRN    levoFLOXacin (LEVAQUIN) 750 mg in D5W IVPB  750 mg IntraVENous Q24H    0.45% sodium chloride infusion  100 mL/hr IntraVENous CONTINUOUS    sodium chloride (NS) flush 5-40 mL  5-40 mL IntraVENous Q8H    sodium chloride (NS) flush 5-40 mL  5-40 mL IntraVENous PRN    acetaminophen (TYLENOL) tablet 650 mg  650 mg Oral Q6H PRN    Or    acetaminophen (TYLENOL) suppository 650 mg  650 mg Rectal Q6H PRN    polyethylene glycol (MIRALAX) packet 17 g  17 g Oral DAILY PRN    ondansetron (ZOFRAN ODT) tablet 4 mg  4 mg Oral Q8H PRN    Or    ondansetron (ZOFRAN) injection 4 mg  4 mg IntraVENous Q6H PRN    apixaban (ELIQUIS) tablet 2.5 mg  2.5 mg Oral Q12H    glucose chewable tablet 16 g  4 Tablet Oral PRN    glucagon (GLUCAGEN) injection 1 mg  1 mg IntraMUSCular PRN    dextrose (D50W) injection syrg 12.5-25 g  25-50 mL IntraVENous PRN    cefepime (MAXIPIME) 1 g in 0.9% sodium chloride (MBP/ADV) 50 mL MBP  1 g IntraVENous Q24H    levoFLOXacin (LEVAQUIN) 750 mg in D5W IVPB  750 mg IntraVENous Q48H    insulin lispro (HUMALOG) injection   SubCUTAneous AC&HS    glucose chewable tablet 16 g  4 Tablet Oral PRN    glucagon (GLUCAGEN) injection 1 mg  1 mg IntraMUSCular PRN    dextrose (D50W) injection syrg 12.5-25 g  25-50 mL IntraVENous PRN       PHYSICAL EXAMINATION:  Patient Vitals for the past 24 hrs:   Temp Pulse Resp BP SpO2   08/27/21 0636  66 20 (!) 104/38 98 %   08/27/21 0611  (!) 46  (!) 89/49    08/27/21 0544 (!) 90.7 °F (32.6 °C) (!) 47  (!) 104/35    08/27/21 0534    (!) 95/36    08/27/21 0530  (!) 43  (!) 101/46    08/27/21 0524  (!) 42 22 (!) 89/38 99 % 08/27/21 0453  (!) 43 15 (!) 90/50 98 %   08/27/21 0434  (!) 39 12 (!) 81/31 98 %   08/27/21 0336 (!) 84.8 °F (29.3 °C) (!) 48 14 (!) 102/44 99 %   08/27/21 0214  (!) 55 15 125/62 99 %   08/27/21 0139 (!) 81 °F (27.2 °C) 60 24 (!) 124/52 100 %     HEENT: NCAT, PERRLA  Neck: supple  Chest: clear  CVS:RRR  Abd: soft, NT, ND, BS++  Ext: no edema  Neuro: non focal exam    Ventilator Settings:  Mode Rate Tidal Volume Pressure FiO2 PEEP                    Peak airway pressure:      Minute ventilation:          LABORATORY DATA:  Labs: Results:   ABG   No results found for: PH, PHI, PCO2, PCO2I, PO2, PO2I, HCO3, HCO3I, FIO2, FIO2I   Chemistry Recent Labs     08/27/21  0155   *   *   K 3.3      CO2 25   *   CREA 1.90*   CA 9.4   AGAP 14   BUCR 74    Estimated Creatinine Clearance: 25.9 mL/min (A) (based on SCr of 1.9 mg/dL (H)). CBC w/Diff Recent Labs     08/27/21  0155   WBC 12.0   RBC 3.61*   HGB 10.8*   HCT 31.0*   *   GRANS 91*   LYMPH 4*   EOS 0      BTNP No results found for: BNP, BNPP, BNPPPOC, XBNPT, BNPNT   Cardiac Enzymes Recent Labs     08/27/21  0155         Coagulation No results for input(s): PTP, INR, APTT, INREXT in the last 72 hours. Liver Enzymes No results for input(s): TP, ALB, TBIL, AP, ALT in the last 72 hours. No lab exists for component: SGOT, DBIL   Thyroid Studies No results for input(s): T4, T3U, TSH, TSHEXT in the last 72 hours. No lab exists for component: T3RU     UA No results for input(s): UGLU in the last 72 hours. No lab exists for component: SOURCEUR, UBILIRUBIN, UKET, USPG, UOCB, UPH, UPSC, UUROBILISQ, UNITR, URINELEUKOC   Blood cultures No results found for: CULT, CULT1, CULT2, GMS    No results for input(s): CULT, CULT1, CULT2, GMS in the last 72 hours.     No results found for: CULT, CULT1, CULT2, GMS   Cultures All Micro Results     Procedure Component Value Units Date/Time    CULTURE, BLOOD [[id number]] Collected: 08/27/21 0540 Order Status: Completed Specimen: Blood Updated: 08/27/21 0552    CULTURE, BLOOD [[id number]] Collected: 08/27/21 0545    Order Status: Canceled Specimen: Blood     CULTURE, BLOOD [[id number]] Collected: 08/27/21 0420    Order Status: Completed Specimen: Blood Updated: 08/27/21 0523    CULTURE, BLOOD [[id number]]     Order Status: Canceled Specimen: Blood     COVID-19 RAPID TEST [[id number]] Collected: 08/27/21 0145    Order Status: Completed Specimen: Nasopharyngeal Updated: 08/27/21 0245     Specimen source Nasopharynx        COVID-19 rapid test Not Detected        Comment:   Rapid NAAT:  The specimen is NEGATIVE for SARS-CoV-2, the novel coronavirus associated with COVID-19. Negative results should be treated as presumptive and, if inconsistent with clinical signs and symptoms or necessary for patient management, should be tested with an alternative molecular assay. Negative results do not preclude SARS-CoV-2 infection and should not be used as the sole basis for patient management decisions. This test has been authorized by the FDA under an Emergency Use   Authorization (EUA) for use by authorized laboratories.  Fact sheet for Healthcare Providers: ConventionUpdate.co.nz Fact sheet for Patients: ConventionUpdate.co.nz   Methodology: Isothermal Nucleic Acid Amplification         CULTURE, URINE [[id number]] Collected: 08/27/21 0145    Order Status: Completed Specimen: Urine Updated: 08/27/21 0209           All Micro Results     Procedure Component Value Units Date/Time    CULTURE, BLOOD [[id number]] Collected: 08/27/21 0540    Order Status: Completed Specimen: Blood Updated: 08/27/21 0552    CULTURE, BLOOD [[id number]] Collected: 08/27/21 0545    Order Status: Canceled Specimen: Blood     CULTURE, BLOOD [[id number]] Collected: 08/27/21 0420    Order Status: Completed Specimen: Blood Updated: 08/27/21 0523    CULTURE, BLOOD [[id number]]     Order Status: Canceled Specimen: Blood COVID-19 RAPID TEST [[id number]] Collected: 08/27/21 0145    Order Status: Completed Specimen: Nasopharyngeal Updated: 08/27/21 0245     Specimen source Nasopharynx        COVID-19 rapid test Not Detected        Comment:   Rapid NAAT:  The specimen is NEGATIVE for SARS-CoV-2, the novel coronavirus associated with COVID-19. Negative results should be treated as presumptive and, if inconsistent with clinical signs and symptoms or necessary for patient management, should be tested with an alternative molecular assay. Negative results do not preclude SARS-CoV-2 infection and should not be used as the sole basis for patient management decisions. This test has been authorized by the FDA under an Emergency Use   Authorization (EUA) for use by authorized laboratories. Fact sheet for Healthcare Providers: ConventionRefinery29date.co.nz Fact sheet for Patients: ConventionUpdate.co.nz   Methodology: Isothermal Nucleic Acid Amplification         CULTURE, URINE [[id number]] Collected: 08/27/21 0145    Order Status: Completed Specimen: Urine Updated: 08/27/21 0209         XR HUMERUS LT    Result Date: 8/27/2021  EXAM: XR HUMERUS LT CLINICAL INDICATION/HISTORY: Left arm pain  COMPARISON: None. TECHNIQUE: AP and lateral views of the left humerus were obtained. _______________ FINDINGS:  There is no acute fracture or dislocation. Normal alignment of the left shoulder and left elbow. Joint spaces are well preserved. Soft tissues are normal in appearance. _______________     No acute abnormality. XR FOREARM LT AP/LAT    Result Date: 8/27/2021  EXAM: XR FOREARM LT AP/LAT CLINICAL INDICATION/HISTORY: Left forearm pain  COMPARISON: None. TECHNIQUE: AP and lateral views of the left forearm were obtained. _______________ FINDINGS: There is no acute fracture or dislocation. Normal articulation of the left elbow and left wrist. Prominent TFCC chondrocalcinosis.  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 Soft tissues are unremarkable. _______________     No acute abnormality. CT HEAD WO CONT    Result Date: 8/27/2021  EXAM: CT HEAD, WITHOUT IV CONTRAST INDICATION: Fall with posttraumatic headache COMPARISON: 8/10/2021 TECHNIQUE: Multiple axial CT images of the head were obtained extending from the skull base through the vertex. Additional coronal and sagittal reformations were also performed. One or more dose reduction techniques were used on this CT: automated exposure control, adjustment of the mAs and/or kVp according to patient size, and iterative reconstruction techniques. The specific techniques used on this CT exam have been documented in the patient's electronic medical record. Digital Imaging and Communications in Medicine (DICOM) format image data are available to nonaffiliated external healthcare facilities or entities on a secure, media free, reciprocally searchable basis with patient authorization for at least a 12-month period after this study. _______________ FINDINGS: BRAIN AND POSTERIOR FOSSA: There is generalized prominence of the ventricular system, associated with proportional widening of the cortical cerebral sulci, compatible with generalized volume loss. Hazy hypoattenuation identified along the periventricular white matter, a nonspecific finding which is most commonly encountered in the setting of chronic microvascular disease. There is no intracranial hemorrhage, mass effect, or midline shift. There are no significant additional areas of abnormal parenchymal attenuation. EXTRA-AXIAL SPACES AND MENINGES: There are no abnormal extra-axial fluid collections. CALVARIUM: No acute osseous abnormality. OTHER: The visualized portions of the paranasal sinuses and mastoid air cells are clear. _______________     1. No CT evidence of an acute intracranial abnormality or other findings related to recent trauma.   2.  Mild cerebral atrophy/volume loss and periventricular white matter changes, most commonly seen with chronic microvascular disease. XR CHEST PORT    Result Date: 8/27/2021  EXAM: CHEST RADIOGRAPH, SINGLE VIEW CLINICAL INDICATION/HISTORY: Chest pain COMPARISON: 8/10/2021 TECHNIQUE: Portable frontal view of the chest was obtained. _______________ FINDINGS: SUPPORT DEVICES: None. HEART AND MEDIASTINUM: Cardiomediastinal silhouette appears mildly enlarged, unchanged. Tortuous and atherosclerotic thoracic aorta. No central vascular congestion. LUNGS AND PLEURAL SPACES: Bandlike atelectasis within left and right lung bases. No acute pulmonary infiltrate. No pleural effusion or pneumothorax. BONY THORAX AND SOFT TISSUES: No acute osseous abnormality. _______________     No active cardiopulmonary disease. Echo 8/12/2021: EF of 61%  Result status: Final result   · LV: Calculated LVEF is 61%. Normal cavity size and systolic function (ejection fraction normal). Mild concentric hypertrophy. · AO: Ascending aorta diameter = 3.6 cm. · Previous echo done 3/2021  · AV: Mild aortic valve regurgitation is present. · RV: Not well visualized. · TV: Mild tricuspid valve regurgitation is present.          ASSESSMENT:  -Septic shock  -Acute kidney injury  -Urinary tract infection  -Bradycardia  -Hypothermia  -History of hypertension    PLAN:  -I have reviewed the patient's presentation, labs, radiology studies  -He presented to the emergency room after a fall brought to the ER by his wife  -He was very weak  -Noted to be hypothermic, bradycardic, hypotensive  -Initial urinalysis shows urinary tract infection, labs showed dehydration indicis and creatinine was elevated  -Patient was admitted to the ICU on dopamine drip after initial 1 L of IV normal saline bolus  -He is currently on cefepime and levofloxacin  -He has multiple bruising on his arms and legs with ecchymosis  -I will give him another liter of normal saline bolus and switch him to Levophed  -Troponins are negative, CPKs normal  -We will slowly titrate the pressors to keep MAP greater than 60 or systolic greater than 90  -Peptic ulcer disease and DVT prophylaxis in place  -Cardiology consult has been requested    Thank you very much for consulting me on this patient total time spent in direct patient care is 31 minutes of critical care time    Celine Ames MD  August 27, 2021  7:51 AM

## 2021-08-27 NOTE — ASSESSMENT & PLAN NOTE
Sepsis order set initiated to include appropriate cultures  Cefepime/Levaquin initiated per protocol  Continue Dopamine for hypotension  Will monitor closely and modify POC accordingly

## 2021-08-27 NOTE — ASSESSMENT & PLAN NOTE
On tele  Cardiology consult placed for further recs  Femoral line and Dopamine ordered in ED, will continue as needed

## 2021-08-27 NOTE — DEATH NOTE
Right femoral central line inserted by , tolerated well by pt. 2nd set of blood cultures obtained and sent to labn as ordered. Pt swabbed for COVID PCR, specimen sent to lab as ordered.

## 2021-08-27 NOTE — CONSULTS
[facility] CARDIOLOGY  CARDIOLOGY CONSULTATION    REASON FOR CONSULT: Bradycardia    REQUESTING PROVIDER: Jeremi Yoo NP    CHIEF COMPLAINT: Fall    HISTORY OF PRESENT ILLNESS:  Katerina Kan is a [de-identified]y.o. year-old male with past medical history significant for DM, HTN, hyperlipidemia, DVT, and dementia who was seen today for evaluation of bradycardia. Unable to obtain history from patient due to memory impairment; information obtained from the medical record. The patient presented to the [facility] ER this morning by EMS after sustaining a fall at home. The fall was witnessed by his wife and she states she is unable to care for him. On arrival to the ER, he was hypothermic with temp of 81 degrees F. During his ER workup, his heart rate dropped to the 30s-40s, and BP dropped to 81/31. A central line was placed and dopamine drip was started. He is being treated for septic shock due to UTI and VLADIMIR. He is being tested for COVID-19. Records from hospital admission course thus far reviewed. Telemetry reviewed. No events overnight. The patient is in ectopic atrial rhythm in the 60s. INPATIENT MEDICATIONS:  Home medications reviewed.     Current Facility-Administered Medications:     sodium chloride (NS) flush 5-10 mL, 5-10 mL, IntraVENous, PRN, Julienne Situ, NP    sodium chloride (NS) flush 5-40 mL, 5-40 mL, IntraVENous, Q8H, [place] Situ, NP    sodium chloride (NS) flush 5-40 mL, 5-40 mL, IntraVENous, PRN, Julienne Situ, NP    acetaminophen (TYLENOL) tablet 650 mg, 650 mg, Oral, Q6H PRN **OR** acetaminophen (TYLENOL) suppository 650 mg, 650 mg, Rectal, Q6H PRN, Julienne Situ, NP    polyethylene glycol (MIRALAX) packet 17 g, 17 g, Oral, DAILY PRN, [place] Situ, NP    ondansetron (ZOFRAN ODT) tablet 4 mg, 4 mg, Oral, Q8H PRN **OR** ondansetron (ZOFRAN) injection 4 mg, 4 mg, IntraVENous, Q6H PRN, Julienne Situ, NP    apixaban (ELIQUIS) tablet 2.5 mg, 2.5 mg, Oral, Q12H, Julienne Situ, NP  Paola Allen dextrose (D50W) injection syrg 12.5-25 g, 25-50 mL, IntraVENous, PRN, Danica Snowden NP    cefepime (MAXIPIME) 1 g in 0.9% sodium chloride (MBP/ADV) 50 mL MBP, 1 g, IntraVENous, Q24H, Danica Snowden NP, Last Rate: 100 mL/hr at 08/27/21 0640, 1 g at 08/27/21 0640    levoFLOXacin (LEVAQUIN) 750 mg in D5W IVPB, 750 mg, IntraVENous, Q48H, Calli Pastrana MD    insulin lispro (HUMALOG) injection, , SubCUTAneous, AC&HS, Calli Pastrana MD    glucose chewable tablet 16 g, 4 Tablet, Oral, PRN, Calli Pastrana MD    glucagon (GLUCAGEN) injection 1 mg, 1 mg, IntraMUSCular, PRN, Calli Pastrana MD    dextrose (D50W) injection syrg 12.5-25 g, 25-50 mL, IntraVENous, PRN, Calli Pastrana MD    sodium chloride 0.9 % bolus infusion 1,000 mL, 1,000 mL, IntraVENous, ONCE, Yisel Estrada MD, Last Rate: 250 mL/hr at 08/27/21 0829, 1,000 mL at 08/27/21 0829    NOREPINephrine (LEVOPHED) 4,000 mcg in dextrose 5% 250 mL infusion, 2 mcg/min, IntraVENous, TITRATE, Yisel Estrada MD, Last Rate: 15 mL/hr at 08/27/21 0914, 4 mcg/min at 08/27/21 0914    0.9% sodium chloride infusion, 100 mL/hr, IntraVENous, CONTINUOUS, Yisel Estrada MD, Last Rate: 100 mL/hr at 08/27/21 0829, 100 mL/hr at 08/27/21 0829    sodium chloride 0.9 % bolus infusion 1,000 mL, 1,000 mL, IntraVENous, NOW, Calli Pastrana MD, Last Rate: 1,000 mL/hr at 08/27/21 0913, 1,000 mL at 08/27/21 0913     ALLERGIES:  Allergies reviewed with the patient,  Allergies   Allergen Reactions    Oxycontin [Oxycodone] Other (comments)     hallucinations    Vicodin [Hydrocodone-Acetaminophen] Other (comments)      FAMILY HISTORY:  Family history reviewed. SOCIAL HISTORY:  Notable for former tobacco use, no alcohol use, and no illicit drug use. REVIEW OF SYSTEMS:  Complete review of systems performed, pertinents noted above, all other systems are negative. PHYSICAL EXAMINATION:  Vital sign assessment reveal a blood pressure of 88/36 and pulse rate of 53. Cardiovascular exam has a heart with a regular rate and rhythm on telemetry. Respiratory exam reveals no supplemental oxygen use. Lymphatic exam reveals no edema. Neurologic exam is notable for dementia. Recent labs results and imaging reviewed.   Notable findings include:  Recent Results (from the past 24 hour(s))   EKG, 12 LEAD, INITIAL    Collection Time: 08/27/21  1:38 AM   Result Value Ref Range    Ventricular Rate 46 BPM    Atrial Rate 44 BPM    P-R Interval 289 ms    QRS Duration 174 ms    Q-T Interval 600 ms    QTC Calculation (Bezet) 525 ms    Calculated P Axis -85 degrees    Calculated R Axis -47 degrees    Calculated T Axis 86 degrees    Diagnosis       Bradycardia with irregular rate  Prolonged KS interval  IVCD, consider atypical RBBB  LVH with IVCD and secondary repol abnrm  Prolonged QT interval     URINALYSIS W/ RFLX MICROSCOPIC    Collection Time: 08/27/21  1:45 AM   Result Value Ref Range    Color Yellow      Appearance Cloudy      Specific gravity 1.016 1.005 - 1.030      pH (UA) 5.0 5.0 - 8.0      Protein 100 (A) Negative mg/dL    Glucose Negative Negative mg/dL    Ketone Negative Negative mg/dL    Bilirubin Negative Negative      Blood NON HEMOLYTIC TRACE (A) Negative      Urobilinogen 0.2 0.2 - 1.0 EU/dL    Nitrites Negative Negative      Leukocyte Esterase Moderate (A) Negative     COVID-19 RAPID TEST    Collection Time: 08/27/21  1:45 AM   Result Value Ref Range    Specimen source Nasopharynx      COVID-19 rapid test Not Detected Not Detected     URINE MICROSCOPIC    Collection Time: 08/27/21  1:45 AM   Result Value Ref Range    WBC 10-20 0 - 4 /hpf    RBC 5-10 0 - 2 /hpf    Epithelial cells Few 0 - 20 /lpf    Bacteria 2+ (A) None /hpf   CBC WITH AUTOMATED DIFF    Collection Time: 08/27/21  1:55 AM   Result Value Ref Range    WBC 12.0 4.6 - 13.2 K/uL    RBC 3.61 (L) 4.35 - 5.65 M/uL    HGB 10.8 (L) 13.0 - 16.0 g/dL    HCT 31.0 (L) 36.0 - 48.0 %    MCV 85.9 78.0 - 100.0 FL    MCH 29.9 24.0 - 34.0 PG    MCHC 34.8 31.0 - 37.0 g/dL    RDW 14.1 11.6 - 14.5 %    PLATELET 607 (H) 590 - 420 K/uL    MPV 11.8 9.2 - 11.8 FL    NRBC 0.0 0.0  WBC    ABSOLUTE NRBC 0.00 0.00 - 0.01 K/uL    NEUTROPHILS 91 (H) 40 - 73 %    LYMPHOCYTES 4 (L) 21 - 52 %    MONOCYTES 4 3 - 10 %    EOSINOPHILS 0 0 - 5 %    BASOPHILS 0 0 - 2 %    IMMATURE GRANULOCYTES 1 (H) 0 - 0.5 %    ABS. NEUTROPHILS 11.0 (H) 1.8 - 8.0 K/UL    ABS. LYMPHOCYTES 0.5 (L) 0.9 - 3.6 K/UL    ABS. MONOCYTES 0.4 0.05 - 1.2 K/UL    ABS. EOSINOPHILS 0.0 0.0 - 0.4 K/UL    ABS. BASOPHILS 0.0 0.0 - 0.1 K/UL    ABS. IMM. GRANS. 0.1 (H) 0.00 - 0.04 K/UL    DF AUTOMATED     METABOLIC PANEL, BASIC    Collection Time: 08/27/21  1:55 AM   Result Value Ref Range    Sodium 146 (H) 135 - 145 mmol/L    Potassium 3.3 3.2 - 5.1 mmol/L    Chloride 107 94 - 111 mmol/L    CO2 25 21 - 33 mmol/L    Anion gap 14 mmol/L    Glucose 271 (H) 70 - 110 mg/dL     (H) 9 - 21 mg/dL    Creatinine 1.90 (H) 0.8 - 1.50 mg/dL    BUN/Creatinine ratio 74      GFR est AA 42 ml/min/1.73m2    GFR est non-AA 34 ml/min/1.73m2    Calcium 9.4 8.5 - 10.5 mg/dL   TROPONIN I    Collection Time: 08/27/21  1:55 AM   Result Value Ref Range    Troponin-I, Qt. 0.05 0.02 - 0.05 ng/mL   CK    Collection Time: 08/27/21  1:55 AM   Result Value Ref Range     38 - 174 U/L   LACTIC ACID    Collection Time: 08/27/21  1:55 AM   Result Value Ref Range    Lactic acid 1.3 0.5 - 2.0 mmol/L   SARS-COV-2    Collection Time: 08/27/21  5:40 AM   Result Value Ref Range    SARS-CoV-2 Please find results under separate order       Discussed case with Dr. Thomas Donis, and our impression and recommendations are as follows:  1. Bradycardia: He is in ectopic atrial rhythm with rate in the 60s. Bradycardia is improving as hypothermia is improving. Atropine may be used if heart rate is sustained < 40. Dopamine drip was switched to Levophed this morning. Continue telemetry monitoring.  Keep serum potassium between 4-5 and serum magnesium > 2. Continue telemetry monitoring. EKGs prn. Echocardiogram completed on 8/12/21 shows EF of 61% with mild LVH, mild AVR, and mild TR.  2. Septic shock: Due to UTI. He is on Levophed and receiving fluid resuscitation. He is on IV antibiotics. He was significantly hypothermic on arrival with hypotension. Pulm/critical care is following as well. Thank you for involving us in the care of this patient. Please do not hesitate to call me or Dr. Hawa Murphy if additional questions arise.

## 2021-08-27 NOTE — ROUTINE PROCESS
8133  TRANSFER - IN REPORT:    Verbal report received from SHYLA Pérez RN(name) on Vernon Hutchison  being received from  ED (unit) for routine progression of care      Report consisted of patients Situation, Background, Assessment and   Recommendations(SBAR). Information from the following report(s) SBAR, ED Summary, Procedure Summary, Intake/Output, MAR, Recent Results and Quality Measures was reviewed with the receiving nurse. Opportunity for questions and clarification was provided. Assessment completed upon patients arrival to unit and care assumed. 0700   Verbal shift change report given to DENISE Daugherty RN (oncoming nurse) by SARITA Hernandez RN(offgoing nurse). Report included the following information SBAR, ED Summary, MAR and Quality Measures.

## 2021-08-27 NOTE — ED PROVIDER NOTES
Pt brought in by medics, state called by spouse, pt h/o dementia, she is unable to take care of him at home anymore. Pt w h/o reported dementia, non verbal currently, history not obtainable otherwise.             Past Medical History:   Diagnosis Date    Abnormality of gait     Benign neoplasm of colon     Depression     DM (diabetes mellitus) ([facility] [address]     Headache     Hypertension     Nonspecific (abnormal) findings on radiological and other examination of genitourinary organs     Other and unspecified hyperlipidemia     Personal history of fall     Right bundle branch block and left anterior fascicular block        Past Surgical History:   Procedure Laterality Date    COLONOSCOPY N/A 3/5/2018    COLONOSCOPY w/ biopsies w/ polypectomy performed by Re Robles MD at [facility] ENDOSCOPY    HX CATARACT REMOVAL      HX CHOLECYSTECTOMY      HX COLONOSCOPY  3/12/2015    HX TONSILLECTOMY           Family History:   Problem Relation Age of Onset    Diabetes Mother     Alzheimer Mother     Cancer Father         Lung Cancer       Social History     Socioeconomic History    Marital status:      Spouse name: Not on file    Number of children: Not on file    Years of education: Not on file    Highest education level: Not on file   Occupational History    Not on file   Tobacco Use    Smoking status: Former Smoker     Types: Cigarettes    Smokeless tobacco: Never Used    Tobacco comment: Quit 40 years ago    Substance and Sexual Activity    Alcohol use: Not Currently     Alcohol/week: 0.0 standard drinks    Drug use: No    Sexual activity: Not on file   Other Topics Concern    Not on file   Social History Narrative    Not on file     Social Determinants of Health     Financial Resource Strain:     Difficulty of Paying Living Expenses:    Food Insecurity:     Worried About Running Out of Food in the Last Year:     Rina of Food in the Last Year:    Transportation Needs:     Lack of Transportation (Medical):  Lack of Transportation (Non-Medical):    Physical Activity:     Days of Exercise per Week:     Minutes of Exercise per Session:    Stress:     Feeling of Stress :    Social Connections:     Frequency of Communication with Friends and Family:     Frequency of Social Gatherings with Friends and Family:     Attends Oriental orthodox Services:     Active Member of Clubs or Organizations:     Attends Club or Organization Meetings:     Marital Status:    Intimate Partner Violence:     Fear of Current or Ex-Partner:     Emotionally Abused:     Physically Abused:     Sexually Abused: ALLERGIES: Oxycontin [oxycodone] and Vicodin [hydrocodone-acetaminophen]    Review of Systems   Unable to perform ROS: Mental status change       Vitals:    08/27/21 0214 08/27/21 0336 08/27/21 0434 08/27/21 0453   BP: 125/62 (!) 102/44 (!) 81/31 (!) 90/50   Pulse: (!) 55 (!) 48 (!) 39 (!) 43   Resp: 15 14 12 15   Temp:  (!) 84.8 °F (29.3 °C)     SpO2: 99% 99% 98% 98%   Weight:       Height:                Physical Exam  Vitals and nursing note reviewed. Constitutional:       Appearance: He is well-developed. HENT:      Head: Normocephalic and atraumatic. Eyes:      Conjunctiva/sclera: Conjunctivae normal.   Cardiovascular:      Rate and Rhythm: Regular rhythm. Bradycardia present. Pulmonary:      Effort: Pulmonary effort is normal.      Breath sounds: No wheezing. Abdominal:      Palpations: Abdomen is soft. There is no mass. Musculoskeletal:      Cervical back: Normal range of motion. Right lower leg: Edema present. Left lower leg: Edema present. Comments: w scatteed skin tears/bruising different stages b/l lower ext. No ttp  + skin tear left mid arm and mid forearm, 2 cm, no bleeding. Mild surr ttp   Skin:     General: Skin is warm and dry. Capillary Refill: Capillary refill takes less than 2 seconds. Findings: No rash.    Neurological:      Mental Status: He is alert. Comments: Alert, non verbal  Jameson x 4   Psychiatric:      Comments: Unable to test          MDM       Central Line    Date/Time: 8/27/2021 5:51 AM  Performed by: Maia Langston MD  Authorized by: Maia Langston MD     Consent:     Consent obtained:  Emergent situation  Pre-procedure details:     Hand hygiene: Hand hygiene performed prior to insertion      Sterile barrier technique: All elements of maximal sterile technique followed      Skin preparation:  2% chlorhexidine    Skin preparation agent: Skin preparation agent completely dried prior to procedure    Procedure details:     Location:  R femoral    Site selection rationale:  Pt on anticoagulation, compressible, lowest risk in urgent situation    Patient position:  Flat    Procedural supplies:  Triple lumen    Catheter size:  7 Fr    Landmarks identified: yes      Ultrasound guidance: no      Number of attempts:  2    Successful placement: yes    Post-procedure details:     Post-procedure:  Dressing applied and line sutured    Assessment:  Blood return through all ports    Patient tolerance of procedure:   Tolerated well, no immediate complications        Vitals:  Patient Vitals for the past 12 hrs:   Temp Pulse Resp BP SpO2   08/27/21 0453  (!) 43 15 (!) 90/50 98 %   08/27/21 0434  (!) 39 12 (!) 81/31 98 %   08/27/21 0336 (!) 84.8 °F (29.3 °C) (!) 48 14 (!) 102/44 99 %   08/27/21 0214  (!) 55 15 125/62 99 %   08/27/21 0139 (!) 81 °F (27.2 °C) 60 24 (!) 124/52 100 %         Medications ordered:   Medications   DOPamine (INTROPIN) 400 mg in dextrose 5% 250 mL infusion (has no administration in time range)   sodium chloride (NS) flush 5-10 mL (has no administration in time range)   cefepime (MAXIPIME) 2 g in 0.9% sodium chloride (MBP/ADV) 100 mL MBP (has no administration in time range)   levoFLOXacin (LEVAQUIN) 750 mg in D5W IVPB (has no administration in time range)   0.45% sodium chloride infusion (has no administration in time range) sodium chloride (NS) flush 5-40 mL (has no administration in time range)   sodium chloride (NS) flush 5-40 mL (has no administration in time range)   acetaminophen (TYLENOL) tablet 650 mg (has no administration in time range)     Or   acetaminophen (TYLENOL) suppository 650 mg (has no administration in time range)   polyethylene glycol (MIRALAX) packet 17 g (has no administration in time range)   ondansetron (ZOFRAN ODT) tablet 4 mg (has no administration in time range)     Or   ondansetron (ZOFRAN) injection 4 mg (has no administration in time range)   apixaban (ELIQUIS) tablet 2.5 mg (has no administration in time range)   0.9% sodium chloride infusion 500 mL (500 mL IntraVENous New Bag 8/27/21 0200)   cefTRIAXone (ROCEPHIN) 1 g in 0.9% sodium chloride (MBP/ADV) 50 mL MBP (1 g IntraVENous New Bag 8/27/21 0438)   cefTRIAXone (ROCEPHIN) 1 g in 0.9% sodium chloride (MBP/ADV) 50 mL MBP (1 g IntraVENous New Bag 8/27/21 0437)   0.9% sodium chloride infusion 500 mL (500 mL IntraVENous New Bag 8/27/21 0449)         Lab findings:  Recent Results (from the past 12 hour(s))   URINALYSIS W/ RFLX MICROSCOPIC    Collection Time: 08/27/21  1:45 AM   Result Value Ref Range    Color Yellow      Appearance Cloudy      Specific gravity 1.016 1.005 - 1.030      pH (UA) 5.0 5.0 - 8.0      Protein 100 (A) Negative mg/dL    Glucose Negative Negative mg/dL    Ketone Negative Negative mg/dL    Bilirubin Negative Negative      Blood NON HEMOLYTIC TRACE (A) Negative      Urobilinogen 0.2 0.2 - 1.0 EU/dL    Nitrites Negative Negative      Leukocyte Esterase Moderate (A) Negative     COVID-19 RAPID TEST    Collection Time: 08/27/21  1:45 AM   Result Value Ref Range    Specimen source Nasopharynx      COVID-19 rapid test Not Detected Not Detected     URINE MICROSCOPIC    Collection Time: 08/27/21  1:45 AM   Result Value Ref Range    WBC 10-20 0 - 4 /hpf    RBC 5-10 0 - 2 /hpf    Epithelial cells Few 0 - 20 /lpf    Bacteria 2+ (A) None /hpf CBC WITH AUTOMATED DIFF    Collection Time: 08/27/21  1:55 AM   Result Value Ref Range    WBC 12.0 4.6 - 13.2 K/uL    RBC 3.61 (L) 4.35 - 5.65 M/uL    HGB 10.8 (L) 13.0 - 16.0 g/dL    HCT 31.0 (L) 36.0 - 48.0 %    MCV 85.9 78.0 - 100.0 FL    MCH 29.9 24.0 - 34.0 PG    MCHC 34.8 31.0 - 37.0 g/dL    RDW 14.1 11.6 - 14.5 %    PLATELET 493 (H) 389 - 420 K/uL    MPV 11.8 9.2 - 11.8 FL    NRBC 0.0 0.0  WBC    ABSOLUTE NRBC 0.00 0.00 - 0.01 K/uL    NEUTROPHILS 91 (H) 40 - 73 %    LYMPHOCYTES 4 (L) 21 - 52 %    MONOCYTES 4 3 - 10 %    EOSINOPHILS 0 0 - 5 %    BASOPHILS 0 0 - 2 %    IMMATURE GRANULOCYTES 1 (H) 0 - 0.5 %    ABS. NEUTROPHILS 11.0 (H) 1.8 - 8.0 K/UL    ABS. LYMPHOCYTES 0.5 (L) 0.9 - 3.6 K/UL    ABS. MONOCYTES 0.4 0.05 - 1.2 K/UL    ABS. EOSINOPHILS 0.0 0.0 - 0.4 K/UL    ABS. BASOPHILS 0.0 0.0 - 0.1 K/UL    ABS. IMM. GRANS. 0.1 (H) 0.00 - 0.04 K/UL    DF AUTOMATED     METABOLIC PANEL, BASIC    Collection Time: 08/27/21  1:55 AM   Result Value Ref Range    Sodium 146 (H) 135 - 145 mmol/L    Potassium 3.3 3.2 - 5.1 mmol/L    Chloride 107 94 - 111 mmol/L    CO2 25 21 - 33 mmol/L    Anion gap 14 mmol/L    Glucose 271 (H) 70 - 110 mg/dL     (H) 9 - 21 mg/dL    Creatinine 1.90 (H) 0.8 - 1.50 mg/dL    BUN/Creatinine ratio 74      GFR est AA 42 ml/min/1.73m2    GFR est non-AA 34 ml/min/1.73m2    Calcium 9.4 8.5 - 10.5 mg/dL   TROPONIN I    Collection Time: 08/27/21  1:55 AM   Result Value Ref Range    Troponin-I, Qt. 0.05 0.02 - 0.05 ng/mL   CK    Collection Time: 08/27/21  1:55 AM   Result Value Ref Range     38 - 174 U/L   LACTIC ACID    Collection Time: 08/27/21  1:55 AM   Result Value Ref Range    Lactic acid 1.3 0.5 - 2.0 mmol/L           X-Ray, CT or other radiology findings or impressions:  XR HUMERUS LT   Final Result      No acute abnormality. XR FOREARM LT AP/LAT   Final Result      No acute abnormality.                   XR CHEST PORT   Final Result      No active cardiopulmonary disease. CT HEAD WO CONT   Final Result      1. No CT evidence of an acute intracranial abnormality or other findings   related to recent trauma. 2.  Mild cerebral atrophy/volume loss and periventricular white matter changes,   most commonly seen with chronic microvascular disease. Progress notes, Consult notes or additional Procedure notes:   3:43 AM bradycardia but no hypotension. To cont close monitoring. No obv fx. Hypothermia, bear huggar in place  D/w np Emmy Ayon, to admit for hospitalist service  dw pt spouse on phone, told of all findings. Confirms pt is full code  5:50 AM pt w bradycardia, now mild hypotension, sbp 80's on multiple checks. Urgent cvl placed for dopamine need. Rt femoral line site used for ease of placement and dec risks. .  Full sterile barriers inc gown/gloves/mask/drapes, sterilized right mid inguinal region w chlorxedine and inserted 7 fr triple lumen cath using through 18 g needle using guidewire on first attempt without difficulty. All 3 ports flushed w 10 ml ns without difficulty. Line sewn into place x 2 and sterile dressing applied. Pt tolerated well. I d/w admitting provider PAULINA Ayon temporary nature of access, if needed greater than 24-48 hours recommend arrange for pt to have medport or picc line inserted and remove cvl. CRITICAL CARE NOTE:  5:55 AM  I have spent 36 minutes of critical care time involved in lab review, consultations with specialist, family decision-making, and documentation. During this entire length of time I was immediately available to the patient. Critical Care: The reason for providing this level of medical care for this critically ill patient was due a critical illness that impaired one or more vital organ systems such that there was a high probability of imminent or life threatening deterioration in the patients condition.  This care involved high complexity decision making to assess, manipulate, and support vital system functions, to treat this degree vital organ system failure and to prevent further life threatening deterioration of the patients condition. Diagnosis:   1. Bradycardia    2. Urinary tract infection without hematuria, site unspecified    3. Hypothermia, initial encounter    4. Fall, initial encounter    5. Contusion of left upper extremity, initial encounter        Disposition: admit    Follow-up Information    None          Patient's Medications   Start Taking    No medications on file   Continue Taking    AMLODIPINE (NORVASC) 10 MG TABLET    Take 1 Tablet by mouth daily. APIXABAN (ELIQUIS) 2.5 MG TABLET    Take 1 Tablet by mouth every twelve (12) hours. CHLORTHALIDONE (HYGROTEN) 50 MG TABLET    Take 1 Tablet by mouth daily. LOSARTAN (COZAAR) 100 MG TABLET    Take 1 Tablet by mouth daily. SIMVASTATIN (ZOCOR) 20 MG TABLET    Take 1 Tablet by mouth nightly.    These Medications have changed    No medications on file   Stop Taking    No medications on file

## 2021-08-27 NOTE — ED NOTES
TRANSFER - OUT REPORT:    Verbal report given to Rowena Benedict Registered Nurse on Paula Reyes  being transferred to ICU Bed 1 for routine progression of care       Report consisted of patients Situation, Background, Assessment and   Recommendations(SBAR). Information from the following report(s) SBAR, ED Summary, MAR, Recent Results and Cardiac Rhythm Bradycardia  was reviewed with the receiving nurse. Lines:   Peripheral IV 08/27/21 Right Forearm (Active)   Site Assessment Clean, dry, & intact 08/27/21 0155   Phlebitis Assessment 0 08/27/21 0155   Infiltration Assessment 0 08/27/21 0155   Dressing Status Clean, dry, & intact 08/27/21 0155   Hub Color/Line Status Pink;Patent; Flushed 08/27/21 0155        Opportunity for questions and clarification was provided.       Patient transported with:   Monitor  Registered Nurse - - -

## 2021-08-27 NOTE — ASSESSMENT & PLAN NOTE
Likely 2/2 volume depletion  Will provide IVF and monitor closely  Hold nephrotoxic meds  Consult nephrology if no improvement noted

## 2021-08-27 NOTE — H&P
History and Physical    Patient: Roula Álvarez MRN: [id number]      YOB: 1940  Age: [de-identified] y.o. Sex: male      Subjective:      Roula Álvarez is a [de-identified] y.o. male with a medical history of dementia, T2DM, HTN, recent DVT who presented to [facility] ED with c/o fall per reports. The patient is unable to provide information 2/2 dementia and wife is not present at time of evaluation. According to ED report, the patient fell at home and was soon brought to the ED for evaluation. Past Medical History:   Diagnosis Date    Abnormality of gait     Benign neoplasm of colon     Depression     DM (diabetes mellitus) ([facility]     Headache     Hypertension     Nonspecific (abnormal) findings on radiological and other examination of genitourinary organs     Other and unspecified hyperlipidemia     Personal history of fall     Right bundle branch block and left anterior fascicular block      Past Surgical History:   Procedure Laterality Date    COLONOSCOPY N/A 3/5/2018    COLONOSCOPY w/ biopsies w/ polypectomy performed by Ilene Avelar MD at [facility] ENDOSCOPY    HX CATARACT REMOVAL      HX CHOLECYSTECTOMY      HX COLONOSCOPY  3/12/2015    HX TONSILLECTOMY        Family History   Problem Relation Age of Onset    Diabetes Mother     Alzheimer Mother     Cancer Father         Lung Cancer     Social History     Tobacco Use    Smoking status: Former Smoker     Types: Cigarettes    Smokeless tobacco: Never Used    Tobacco comment: Quit 40 years ago    Substance Use Topics    Alcohol use: Not Currently     Alcohol/week: 0.0 standard drinks      Allergies   Allergen Reactions    Oxycontin [Oxycodone] Other (comments)     hallucinations    Vicodin [Hydrocodone-Acetaminophen] Other (comments)     Immunizations are UTD per pt. Patient will be admitted for Hypothermia [T68. XXXA]  Bradycardia [R00.1]  UTI (urinary tract infection) [N39.0]  Fall [W19. XXXA]  to hospitalist service as Inpatient and evaluated daily via physical assessment, diagnostic testing, and laboratory assessment. Review of Systems:  Unable to obtain 2/2 AMS/Dementia      Objective:     Vitals:    08/27/21 0214 08/27/21 0336 08/27/21 0434 08/27/21 0453   BP: 125/62 (!) 102/44 (!) 81/31 (!) 90/50   Pulse: (!) 55 (!) 48 (!) 39 (!) 43   Resp: 15 14 12 15   Temp:  (!) 84.8 °F (29.3 °C)     SpO2: 99% 99% 98% 98%   Weight:       Height:            Physical Exam:  - GENERAL: Alert and disoriented. No acute distress. Well-nourished. ?- HEENT: EOMI. Anicteric. Moist mucous membranes. No scleral icterus. No cervical lymphadenopathy    -NECK: Supple, no tracheal deviation, no JVD. ? - LUNGS: Clear to auscultation bilaterally. No accessory muscle use. ? Chest symmetrical, No wheezing, rales, rhonchi noted. Appropriate respiratory effort.     - CARDIOVASCULAR: Bradycardic. BLE edema noted- 1+    - ABDOMEN: Soft and non-distended. No palpable masses. , lesions, hepatomegaly. Bowels active X4 quadrants. - SKIN: Warm, dry.   Multiple skin tears and abrasions noted     - MUSCULOSKELETAL:  no deformities     - NEUROLOGIC: No focal neurological deficits.     - PSYCHIATRIC: Calm & Cooperative    Recent Results (from the past 12 hour(s))   URINALYSIS W/ RFLX MICROSCOPIC    Collection Time: 08/27/21  1:45 AM   Result Value Ref Range    Color Yellow      Appearance Cloudy      Specific gravity 1.016 1.005 - 1.030      pH (UA) 5.0 5.0 - 8.0      Protein 100 (A) Negative mg/dL    Glucose Negative Negative mg/dL    Ketone Negative Negative mg/dL    Bilirubin Negative Negative      Blood NON HEMOLYTIC TRACE (A) Negative      Urobilinogen 0.2 0.2 - 1.0 EU/dL    Nitrites Negative Negative      Leukocyte Esterase Moderate (A) Negative     COVID-19 RAPID TEST    Collection Time: 08/27/21  1:45 AM   Result Value Ref Range    Specimen source Nasopharynx      COVID-19 rapid test Not Detected Not Detected     URINE MICROSCOPIC    Collection Time: 08/27/21 1:45 AM   Result Value Ref Range    WBC 10-20 0 - 4 /hpf    RBC 5-10 0 - 2 /hpf    Epithelial cells Few 0 - 20 /lpf    Bacteria 2+ (A) None /hpf   CBC WITH AUTOMATED DIFF    Collection Time: 08/27/21  1:55 AM   Result Value Ref Range    WBC 12.0 4.6 - 13.2 K/uL    RBC 3.61 (L) 4.35 - 5.65 M/uL    HGB 10.8 (L) 13.0 - 16.0 g/dL    HCT 31.0 (L) 36.0 - 48.0 %    MCV 85.9 78.0 - 100.0 FL    MCH 29.9 24.0 - 34.0 PG    MCHC 34.8 31.0 - 37.0 g/dL    RDW 14.1 11.6 - 14.5 %    PLATELET 697 (H) 194 - 420 K/uL    MPV 11.8 9.2 - 11.8 FL    NRBC 0.0 0.0  WBC    ABSOLUTE NRBC 0.00 0.00 - 0.01 K/uL    NEUTROPHILS 91 (H) 40 - 73 %    LYMPHOCYTES 4 (L) 21 - 52 %    MONOCYTES 4 3 - 10 %    EOSINOPHILS 0 0 - 5 %    BASOPHILS 0 0 - 2 %    IMMATURE GRANULOCYTES 1 (H) 0 - 0.5 %    ABS. NEUTROPHILS 11.0 (H) 1.8 - 8.0 K/UL    ABS. LYMPHOCYTES 0.5 (L) 0.9 - 3.6 K/UL    ABS. MONOCYTES 0.4 0.05 - 1.2 K/UL    ABS. EOSINOPHILS 0.0 0.0 - 0.4 K/UL    ABS. BASOPHILS 0.0 0.0 - 0.1 K/UL    ABS. IMM.  GRANS. 0.1 (H) 0.00 - 0.04 K/UL    DF AUTOMATED     METABOLIC PANEL, BASIC    Collection Time: 08/27/21  1:55 AM   Result Value Ref Range    Sodium 146 (H) 135 - 145 mmol/L    Potassium 3.3 3.2 - 5.1 mmol/L    Chloride 107 94 - 111 mmol/L    CO2 25 21 - 33 mmol/L    Anion gap 14 mmol/L    Glucose 271 (H) 70 - 110 mg/dL     (H) 9 - 21 mg/dL    Creatinine 1.90 (H) 0.8 - 1.50 mg/dL    BUN/Creatinine ratio 74      GFR est AA 42 ml/min/1.73m2    GFR est non-AA 34 ml/min/1.73m2    Calcium 9.4 8.5 - 10.5 mg/dL   TROPONIN I    Collection Time: 08/27/21  1:55 AM   Result Value Ref Range    Troponin-I, Qt. 0.05 0.02 - 0.05 ng/mL   CK    Collection Time: 08/27/21  1:55 AM   Result Value Ref Range     38 - 174 U/L   LACTIC ACID    Collection Time: 08/27/21  1:55 AM   Result Value Ref Range    Lactic acid 1.3 0.5 - 2.0 mmol/L   SARS-COV-2    Collection Time: 08/27/21  5:40 AM   Result Value Ref Range    SARS-CoV-2 Please find results under separate order              Assessment/Plan:   Se[facility] shock due [facility] ([facility]  Sepsis order set initiated to include appropriate cultures  Cefepime/Levaquin initiated per protocol  Will monitor closely and modify POC accordingly    VLADIMIR (acute kidney injury) ([facility]  Likely 2/2 volume depletion  Will provide IVF and monitor closely  Hold nephrotoxic meds  Consult nephrology if no improvement noted    Bradycardia  On tele  Cardiology consult placed for further recs  Femoral line and Dopamine ordered in ED, will continue as needed      Hypothermia  Warming blanket ordered  Monitor closely    Hypertension  Hold antihypertensives given hypotension    Fall  Recurrent  PT/OT consult  Fall precautions  CT head negative for acute intracranial abnormalities    History of DVT (deep vein thrombosis)  Continue Eliquis    DM (diabetes mellitus) ([facility]  Accuchecks, SSI, A1C    Prophylaxis- Eliquis for DVT  Disposition: admitted to inpatient. Pending clinical improvement and culture results.   CC time 55 min    Signed By: Almetta Kawasaki, NP     August 27, 2021

## 2021-08-27 NOTE — PROGRESS NOTES
PT consulted with nursing concerning patient status in order to conduct PT eval. Pt is currently hypotensive and not currently appropriate to participate in physical therapy. Pt currently resting in bed. PT will continue to monitor.

## 2021-08-27 NOTE — PROGRESS NOTES
Comprehensive Nutrition Assessment    Type and Reason for Visit: Initial    Nutrition Recommendations/Plan: 4 CHO choice with cardiac restriction and provide glucerna BID    Nutrition Assessment:  [de-identified] yo male PMH: dementia, type DM, HTN, DVT   fall at home came to ED having sepsis 2/2 UTI but also COVID R/O. Pt with dementia not reliable historian. BMI 20.1  BG elevated 271 then down to 67 modify to 4 CHO choice cardiac diet for consistent CHO servings. Start ONS glucerna due to increased needs related to sepsis.      Recent Results (from the past 24 hour(s))   EKG, 12 LEAD, INITIAL    Collection Time: 08/27/21  1:38 AM   Result Value Ref Range    Ventricular Rate 46 BPM    Atrial Rate 44 BPM    P-R Interval 289 ms    QRS Duration 174 ms    Q-T Interval 600 ms    QTC Calculation (Bezet) 525 ms    Calculated P Axis -85 degrees    Calculated R Axis -47 degrees    Calculated T Axis 86 degrees    Diagnosis       Bradycardia with irregular rate  Prolonged OH interval  IVCD, consider atypical RBBB  LVH with IVCD and secondary repol abnrm  Prolonged QT interval     URINALYSIS W/ RFLX MICROSCOPIC    Collection Time: 08/27/21  1:45 AM   Result Value Ref Range    Color Yellow      Appearance Cloudy      Specific gravity 1.016 1.005 - 1.030      pH (UA) 5.0 5.0 - 8.0      Protein 100 (A) Negative mg/dL    Glucose Negative Negative mg/dL    Ketone Negative Negative mg/dL    Bilirubin Negative Negative      Blood NON HEMOLYTIC TRACE (A) Negative      Urobilinogen 0.2 0.2 - 1.0 EU/dL    Nitrites Negative Negative      Leukocyte Esterase Moderate (A) Negative     COVID-19 RAPID TEST    Collection Time: 08/27/21  1:45 AM   Result Value Ref Range    Specimen source Nasopharynx      COVID-19 rapid test Not Detected Not Detected     URINE MICROSCOPIC    Collection Time: 08/27/21  1:45 AM   Result Value Ref Range    WBC 10-20 0 - 4 /hpf    RBC 5-10 0 - 2 /hpf    Epithelial cells Few 0 - 20 /lpf    Bacteria 2+ (A) None /hpf   CBC WITH AUTOMATED DIFF    Collection Time: 08/27/21  1:55 AM   Result Value Ref Range    WBC 12.0 4.6 - 13.2 K/uL    RBC 3.61 (L) 4.35 - 5.65 M/uL    HGB 10.8 (L) 13.0 - 16.0 g/dL    HCT 31.0 (L) 36.0 - 48.0 %    MCV 85.9 78.0 - 100.0 FL    MCH 29.9 24.0 - 34.0 PG    MCHC 34.8 31.0 - 37.0 g/dL    RDW 14.1 11.6 - 14.5 %    PLATELET 054 (H) 018 - 420 K/uL    MPV 11.8 9.2 - 11.8 FL    NRBC 0.0 0.0  WBC    ABSOLUTE NRBC 0.00 0.00 - 0.01 K/uL    NEUTROPHILS 91 (H) 40 - 73 %    LYMPHOCYTES 4 (L) 21 - 52 %    MONOCYTES 4 3 - 10 %    EOSINOPHILS 0 0 - 5 %    BASOPHILS 0 0 - 2 %    IMMATURE GRANULOCYTES 1 (H) 0 - 0.5 %    ABS. NEUTROPHILS 11.0 (H) 1.8 - 8.0 K/UL    ABS. LYMPHOCYTES 0.5 (L) 0.9 - 3.6 K/UL    ABS. MONOCYTES 0.4 0.05 - 1.2 K/UL    ABS. EOSINOPHILS 0.0 0.0 - 0.4 K/UL    ABS. BASOPHILS 0.0 0.0 - 0.1 K/UL    ABS. IMM.  GRANS. 0.1 (H) 0.00 - 0.04 K/UL    DF AUTOMATED     METABOLIC PANEL, BASIC    Collection Time: 08/27/21  1:55 AM   Result Value Ref Range    Sodium 146 (H) 135 - 145 mmol/L    Potassium 3.3 3.2 - 5.1 mmol/L    Chloride 107 94 - 111 mmol/L    CO2 25 21 - 33 mmol/L    Anion gap 14 mmol/L    Glucose 271 (H) 70 - 110 mg/dL     (H) 9 - 21 mg/dL    Creatinine 1.90 (H) 0.8 - 1.50 mg/dL    BUN/Creatinine ratio 74      GFR est AA 42 ml/min/1.73m2    GFR est non-AA 34 ml/min/1.73m2    Calcium 9.4 8.5 - 10.5 mg/dL   TROPONIN I    Collection Time: 08/27/21  1:55 AM   Result Value Ref Range    Troponin-I, Qt. 0.05 0.02 - 0.05 ng/mL   CK    Collection Time: 08/27/21  1:55 AM   Result Value Ref Range     38 - 174 U/L   LACTIC ACID    Collection Time: 08/27/21  1:55 AM   Result Value Ref Range    Lactic acid 1.3 0.5 - 2.0 mmol/L   SARS-COV-2    Collection Time: 08/27/21  5:40 AM   Result Value Ref Range    SARS-CoV-2 Please find results under separate order     MAGNESIUM    Collection Time: 08/27/21  9:00 AM   Result Value Ref Range    Magnesium 3.1 (H) 1.7 - 2.8 mg/dL   GLUCOSE, POC    Collection Time: 08/27/21  1:24 PM   Result Value Ref Range    Glucose (POC) 67 (L) 70 - 110 mg/dL    Performed by Henrry Villasenor        Malnutrition Assessment:  Malnutrition Status:  Insufficient data (Pt dementia and COVID rule out)    Context:  Acute illness     Findings of the 6 clinical characteristics of malnutrition:   Energy Intake:  Unable to assess  Weight Loss:  Unable to assess     Body Fat Loss:  Unable to assess,     Muscle Mass Loss:  Unable to assess,    Fluid Accumulation:  Unable to assess,     Strength:  Not performed         Estimated Daily Nutrient Needs:  Energy (kcal): 4369-8624 kcal/day; Weight Used for Energy Requirements: Admission (60 kg)  Protein (g): 60-72 g/day; Weight Used for Protein Requirements: Admission (1-1.2 g/kg)  Fluid (ml/day): 3878-2261 mL/day; Method Used for Fluid Requirements: 1 ml/kcal      Nutrition Related Findings:  fall at home came to ED having sepsis 2/2 UTI but also COVID R/O. Pt with dementia not reliable historian. BMI 20.1      Wounds:    None       Current Nutrition Therapies:  ADULT DIET Regular    Anthropometric Measures:  · Height:  5' 6\" (167.6 cm)  · Current Body Wt:  59 kg (130 lb)   · Admission Body Wt:  130 lb    · Usual Body Wt:        · Ideal Body Wt:  142 lbs:  91.5 %   · Adjusted Body Weight:   ; Weight Adjustment for: No adjustment   · Adjusted BMI:       · BMI Category:  Normal weight (BMI 18.5-24. 9)       Nutrition Diagnosis:   · Increased nutrient needs related to increased demand for energy/nutrients (sepsis) as evidenced by other (specify) (sepsis)      Nutrition Interventions:   Food and/or Nutrient Delivery: Modify current diet, Start oral nutrition supplement  Nutrition Education and Counseling: Education not appropriate  Coordination of Nutrition Care: Continue to monitor while inpatient    Goals:  glucose , Hgb A1c < 7, Pt to eat > 75% of meals and supplement, BM q 1-3 days       Nutrition Monitoring and Evaluation:   Behavioral-Environmental Outcomes:    Food/Nutrient Intake Outcomes: Food and nutrient intake, Supplement intake  Physical Signs/Symptoms Outcomes: Biochemical data, Meal time behavior, Weight, Constipation    Discharge Planning:    Continue current diet, Continue oral nutrition supplement     Electronically signed by Vance Alas on 8/27/2021 at 3:32 PM    Contact: CELIA [phone]

## 2021-08-27 NOTE — PROGRESS NOTES
Admission Medication Reconciliation:    Information obtained from:  outpatient dispense report    Comments/Recommendations: Reviewed PTA medications and patient's allergies. Unable to speak to patient (COVID-19 Rule Out). Notified nursing that hydralazine added to outpatient medication list.  Nursing will further verify with patient. Allergies:  Oxycontin [oxycodone] and Vicodin [hydrocodone-acetaminophen]    Significant PMH/Disease States:   Past Medical History:   Diagnosis Date    Abnormality of gait     Benign neoplasm of colon     Depression     DM (diabetes mellitus) (HCC)     Headache     Hypertension     Nonspecific (abnormal) findings on radiological and other examination of genitourinary organs     Other and unspecified hyperlipidemia     Personal history of fall     Right bundle branch block and left anterior fascicular block      Chief Complaint for this Admission:    Chief Complaint   Patient presents with    Fall     Prior to Admission Medications:   Prior to Admission Medications   Prescriptions Last Dose Informant Patient Reported? Taking? amLODIPine (NORVASC) 10 mg tablet   No No   Sig: Take 1 Tablet by mouth daily. apixaban (ELIQUIS) 2.5 mg tablet   No No   Sig: Take 1 Tablet by mouth every twelve (12) hours. chlorthalidone (HYGROTEN) 50 mg tablet   No No   Sig: Take 1 Tablet by mouth daily. Patient taking differently: Take 50 mg by mouth Every morning. With food   hydrALAZINE (APRESOLINE) 50 mg tablet   Yes Yes   Sig: Take 25 mg by mouth three (3) times daily. losartan (COZAAR) 100 mg tablet   No No   Sig: Take 1 Tablet by mouth daily. simvastatin (ZOCOR) 20 mg tablet   No No   Sig: Take 1 Tablet by mouth nightly.       Facility-Administered Medications: None       [place] Fitting

## 2021-08-27 NOTE — ED TRIAGE NOTES
Pt to ED via EMS with witnessed fall. Per EMS pts wife states pt fell and was on floor for a few minutes, pts wife states she is no longer able to care for him and needs him to be placed in hospital/rehab.     Multiple bruises, abrasions, and skin tears noted to bilateral upper and lower extremities upon arrival.

## 2021-08-27 NOTE — PROGRESS NOTES
Reason for Admission: Chart reviewed and noted patient presented with C/O a fall. The pt was unable to tell exactly what happened due to him having dementia and his wife wasn't present. Dx: Septic Shock due to UTI Infection    PMH: Abnormality of Gait, Depression,DM, HTN, HLD, Right Bundle Branch Block and Left Anterior Fascicular Block                      RUR Score: 15%                    Plan for utilizing home health: TBD         PCP: First and Last name:  Parviz Pinto MD     Name of Practice:    Are you a current patient: Yes/No:    Approximate date of last visit:    Can you participate in a virtual visit with your PCP:                     Current Advanced Directive/Advance Care Plan: Full Code      Healthcare Decision Maker:  Melissa Singer  Click here to complete [employer] including selection of the Healthcare Decision Maker Relationship (ie \"Primary\")                             Transition of Care Plan:  TBD    Care Management Interventions  PCP Verified by CM: Yes  Transition of Care Consult (CM Consult):  Discharge Planning  Current Support Network: Wife- Melissa Singer- [phone].

## 2021-08-28 LAB
ALBUMIN SERPL-MCNC: 2.3 G/DL (ref 3.5–4.7)
ALBUMIN/GLOB SERPL: 0.8 {RATIO}
ALP SERPL-CCNC: 69 U/L (ref 38–126)
ALT SERPL-CCNC: 33 U/L (ref 3–72)
ANION GAP SERPL CALC-SCNC: 6 MMOL/L
ANION GAP SERPL CALC-SCNC: 8 MMOL/L
AST SERPL W P-5'-P-CCNC: 37 U/L (ref 17–74)
ATRIAL RATE: 44 BPM
BACTERIA SPEC CULT: NORMAL
BASOPHILS # BLD: 0 K/UL (ref 0–0.1)
BASOPHILS NFR BLD: 0 % (ref 0–2)
BILIRUB SERPL-MCNC: 0.8 MG/DL (ref 0.2–1)
BUN SERPL-MCNC: 71 MG/DL (ref 9–21)
BUN SERPL-MCNC: 96 MG/DL (ref 9–21)
BUN/CREAT SERPL: 55
BUN/CREAT SERPL: 56
CA-I BLD-MCNC: 8.2 MG/DL (ref 8.5–10.5)
CA-I BLD-MCNC: 8.3 MG/DL (ref 8.5–10.5)
CALCULATED P AXIS, ECG09: -85 DEGREES
CALCULATED R AXIS, ECG10: -47 DEGREES
CALCULATED T AXIS, ECG11: 86 DEGREES
CHLORIDE SERPL-SCNC: 117 MMOL/L (ref 94–111)
CHLORIDE SERPL-SCNC: 119 MMOL/L (ref 94–111)
CO2 SERPL-SCNC: 23 MMOL/L (ref 21–33)
CO2 SERPL-SCNC: 23 MMOL/L (ref 21–33)
CREAT SERPL-MCNC: 1.3 MG/DL (ref 0.8–1.5)
CREAT SERPL-MCNC: 1.7 MG/DL (ref 0.8–1.5)
DIAGNOSIS, 93000: NORMAL
DIFFERENTIAL METHOD BLD: ABNORMAL
EOSINOPHIL # BLD: 0 K/UL (ref 0–0.4)
EOSINOPHIL NFR BLD: 0 % (ref 0–5)
ERYTHROCYTE [DISTWIDTH] IN BLOOD BY AUTOMATED COUNT: 14.7 % (ref 11.6–14.5)
GLOBULIN SER CALC-MCNC: 3 G/DL
GLUCOSE BLD STRIP.AUTO-MCNC: 168 MG/DL (ref 70–110)
GLUCOSE BLD STRIP.AUTO-MCNC: 66 MG/DL (ref 70–110)
GLUCOSE BLD STRIP.AUTO-MCNC: 73 MG/DL (ref 70–110)
GLUCOSE BLD STRIP.AUTO-MCNC: 98 MG/DL (ref 70–110)
GLUCOSE SERPL-MCNC: 147 MG/DL (ref 70–110)
GLUCOSE SERPL-MCNC: 59 MG/DL (ref 70–110)
HCT VFR BLD AUTO: 23.1 % (ref 36–48)
HGB BLD-MCNC: 8.1 G/DL (ref 13–16)
IMM GRANULOCYTES # BLD AUTO: 0.1 K/UL (ref 0–0.04)
IMM GRANULOCYTES NFR BLD AUTO: 1 % (ref 0–0.5)
LYMPHOCYTES # BLD: 1.2 K/UL (ref 0.9–3.6)
LYMPHOCYTES NFR BLD: 11 % (ref 21–52)
MAGNESIUM SERPL-MCNC: 2.3 MG/DL (ref 1.7–2.8)
MCH RBC QN AUTO: 30.3 PG (ref 24–34)
MCHC RBC AUTO-ENTMCNC: 35.1 G/DL (ref 31–37)
MCV RBC AUTO: 86.5 FL (ref 78–100)
MONOCYTES # BLD: 1 K/UL (ref 0.05–1.2)
MONOCYTES NFR BLD: 8 % (ref 3–10)
NEUTS SEG # BLD: 9.5 K/UL (ref 1.8–8)
NEUTS SEG NFR BLD: 80 % (ref 40–73)
NRBC # BLD: 0 K/UL (ref 0–0.01)
NRBC BLD-RTO: 0 PER 100 WBC
P-R INTERVAL, ECG05: 289 MS
PERFORMED BY, TECHID: ABNORMAL
PERFORMED BY, TECHID: ABNORMAL
PERFORMED BY, TECHID: NORMAL
PERFORMED BY, TECHID: NORMAL
PLATELET # BLD AUTO: 497 K/UL (ref 135–420)
PMV BLD AUTO: 12.2 FL (ref 9.2–11.8)
POTASSIUM SERPL-SCNC: 3.5 MMOL/L (ref 3.2–5.1)
POTASSIUM SERPL-SCNC: 3.9 MMOL/L (ref 3.2–5.1)
PROT SERPL-MCNC: 5.3 G/DL (ref 6.1–8.4)
Q-T INTERVAL, ECG07: 600 MS
QRS DURATION, ECG06: 174 MS
QTC CALCULATION (BEZET), ECG08: 525 MS
RBC # BLD AUTO: 2.67 M/UL (ref 4.35–5.65)
SODIUM SERPL-SCNC: 146 MMOL/L (ref 135–145)
SODIUM SERPL-SCNC: 150 MMOL/L (ref 135–145)
SPECIAL REQUESTS,SREQ: NORMAL
VENTRICULAR RATE, ECG03: 46 BPM
WBC # BLD AUTO: 11.9 K/UL (ref 4.6–13.2)

## 2021-08-28 PROCEDURE — 74011250637 HC RX REV CODE- 250/637: Performed by: INTERNAL MEDICINE

## 2021-08-28 PROCEDURE — 85025 COMPLETE CBC W/AUTO DIFF WBC: CPT

## 2021-08-28 PROCEDURE — 74011000250 HC RX REV CODE- 250: Performed by: INTERNAL MEDICINE

## 2021-08-28 PROCEDURE — 36415 COLL VENOUS BLD VENIPUNCTURE: CPT

## 2021-08-28 PROCEDURE — 74011000258 HC RX REV CODE- 258: Performed by: INTERNAL MEDICINE

## 2021-08-28 PROCEDURE — 74011000258 HC RX REV CODE- 258: Performed by: NURSE PRACTITIONER

## 2021-08-28 PROCEDURE — 74011250637 HC RX REV CODE- 250/637: Performed by: NURSE PRACTITIONER

## 2021-08-28 PROCEDURE — 80048 BASIC METABOLIC PNL TOTAL CA: CPT

## 2021-08-28 PROCEDURE — 74011250636 HC RX REV CODE- 250/636: Performed by: NURSE PRACTITIONER

## 2021-08-28 PROCEDURE — 83735 ASSAY OF MAGNESIUM: CPT

## 2021-08-28 PROCEDURE — 82962 GLUCOSE BLOOD TEST: CPT

## 2021-08-28 PROCEDURE — 74011636637 HC RX REV CODE- 636/637: Performed by: INTERNAL MEDICINE

## 2021-08-28 PROCEDURE — 74011250636 HC RX REV CODE- 250/636: Performed by: INTERNAL MEDICINE

## 2021-08-28 PROCEDURE — 65610000006 HC RM INTENSIVE CARE

## 2021-08-28 PROCEDURE — 80053 COMPREHEN METABOLIC PANEL: CPT

## 2021-08-28 RX ORDER — CLINDAMYCIN HYDROCHLORIDE 150 MG/1
300 CAPSULE ORAL EVERY 8 HOURS
Status: COMPLETED | OUTPATIENT
Start: 2021-08-28 | End: 2021-09-02

## 2021-08-28 RX ORDER — SODIUM CHLORIDE 450 MG/100ML
75 INJECTION, SOLUTION INTRAVENOUS CONTINUOUS
Status: DISCONTINUED | OUTPATIENT
Start: 2021-08-28 | End: 2021-09-02

## 2021-08-28 RX ADMIN — SODIUM CHLORIDE 100 ML/HR: 4.5 INJECTION, SOLUTION INTRAVENOUS at 10:24

## 2021-08-28 RX ADMIN — NOREPINEPHRINE BITARTRATE 1 MCG/MIN: 1 INJECTION, SOLUTION, CONCENTRATE INTRAVENOUS at 01:40

## 2021-08-28 RX ADMIN — Medication 10 ML: at 14:43

## 2021-08-28 RX ADMIN — CEFEPIME HYDROCHLORIDE 1 G: 1 INJECTION, POWDER, FOR SOLUTION INTRAMUSCULAR; INTRAVENOUS at 05:56

## 2021-08-28 RX ADMIN — SODIUM CHLORIDE 100 ML/HR: 9 INJECTION, SOLUTION INTRAVENOUS at 01:37

## 2021-08-28 RX ADMIN — SODIUM CHLORIDE 125 ML/HR: 4.5 INJECTION, SOLUTION INTRAVENOUS at 18:17

## 2021-08-28 RX ADMIN — Medication 10 ML: at 21:31

## 2021-08-28 RX ADMIN — INSULIN LISPRO 2 UNITS: 100 INJECTION, SOLUTION INTRAVENOUS; SUBCUTANEOUS at 21:31

## 2021-08-28 RX ADMIN — Medication 10 ML: at 06:00

## 2021-08-28 RX ADMIN — APIXABAN 2.5 MG: 2.5 TABLET, FILM COATED ORAL at 21:30

## 2021-08-28 RX ADMIN — APIXABAN 2.5 MG: 2.5 TABLET, FILM COATED ORAL at 08:29

## 2021-08-28 RX ADMIN — CLINDAMYCIN HYDROCHLORIDE 300 MG: 150 CAPSULE ORAL at 21:30

## 2021-08-28 RX ADMIN — CLINDAMYCIN HYDROCHLORIDE 300 MG: 150 CAPSULE ORAL at 14:43

## 2021-08-28 NOTE — PROGRESS NOTES
Verbal shift change report given to DENISE Daugherty, RNN (oncoming nurse) by DAQUAN Rodriguez RN (offgoing nurse). Report included the following information Kardex, Intake/Output, MAR and Recent Results.

## 2021-08-28 NOTE — PROGRESS NOTES
@7398 Received care of pt from off going nurse. @2010 PM Assessment completed. A&OX2 with confusion. Resp even and non-labored. Lungs clear, SATS 98% on RA. Skin warm and dry. Pt has multiple bruises, skin tears, and abrasions to BUE & BLE.  +2 pitting edema to BUE &BLE. James patent, draining dark macroon colored urine. TLC to right femoral intact. Levophed drip @ 5 mcg/min. Pt fed dinner,  Pt ate <10%. Pt turned and repositioned for comfort. Pt temp 98.7, oral. Bear hugger removed. @2224 Pt took po pm meds in applesauce. @0020 Pt arousal at present. Pt turned and repositioned. Urine in james hematuria. CBWR, bed in lowest position.

## 2021-08-28 NOTE — PROGRESS NOTES
Progress Note    Patient: Bipin Lau MRN: [id number]  SSN: [id number]    YOB: 1940  Age: [de-identified] y.o. Sex: male      Admit Date: 8/27/2021    LOS: 1 day     Assessment and Plan:     Septic shock due to urinary tract infection ([facility] [address]  Acute sepsis has resolved. Continue cefepime to cover his urine. I am a little suspicious of his lower extremity wounds. They appear warm to me. He has some mild streaking bilaterally. We do need to add coverage for MRSA. Given that he can take p.o., will start p.o. clindamycin  Discontinue Levaquin given prolonged QT interval.  I have reviewed his EKG    VLADIMIR (acute kidney injury) ([facility] [address]  Likely 2/2 volume depletion  Renal function is improving albeit slowly. Continue IV fluids but change to half-normal.  Encourage free water intake. May need to switch to quarter normal repeat BMP tonight       Hypothermia  Resolved     Hypertension  Hold antihypertensives for today     Fall  Recurrent  PT/OT consult  Fall precautions  CT head negative for acute intracranial abnormalities     History of DVT (deep vein thrombosis)  Continue Eliquis     DM (diabetes mellitus) ([facility])  Accuchecks, SSI, A1C     Hypernatremia  - encourage free water intake. Change IVF to 1/2NS    Elevated cardiac enzymes  There is a slight increase in troponins but I suspect this was due to renal failure. He has no chest pain. He does have a prolonged QT. He has no signs of fluid overload at this time  Bilateral lower extremity wounds present on admission note these or not decubitus wounds  -Red and warm. Continue cefepime but will add clindamycin. Will hold vancomycin given his renal insufficiency      This patient's condition remains guarded      Subjective:   Tim Kincaid reports he actually feels a little bit better. He would like something to eat. He has no nausea or vomiting. He reports the wounds on his legs have been there for quite some time.   He otherwise has no other complaints this morning. Case discussed with nursing at length. The patient originally came in hypothermic and in what appears to be shock. He has subsequently been partially resuscitated and we are switching his fluids. He shows no signs of fluid overload at this time        Current Facility-Administered Medications   Medication Dose Route Frequency    0.45% sodium chloride infusion  100 mL/hr IntraVENous CONTINUOUS    sodium chloride (NS) flush 5-10 mL  5-10 mL IntraVENous PRN    sodium chloride (NS) flush 5-40 mL  5-40 mL IntraVENous Q8H    sodium chloride (NS) flush 5-40 mL  5-40 mL IntraVENous PRN    acetaminophen (TYLENOL) tablet 650 mg  650 mg Oral Q6H PRN    Or    acetaminophen (TYLENOL) suppository 650 mg  650 mg Rectal Q6H PRN    polyethylene glycol (MIRALAX) packet 17 g  17 g Oral DAILY PRN    ondansetron (ZOFRAN ODT) tablet 4 mg  4 mg Oral Q8H PRN    Or    ondansetron (ZOFRAN) injection 4 mg  4 mg IntraVENous Q6H PRN    apixaban (ELIQUIS) tablet 2.5 mg  2.5 mg Oral Q12H    dextrose (D50W) injection syrg 12.5-25 g  25-50 mL IntraVENous PRN    cefepime (MAXIPIME) 1 g in 0.9% sodium chloride (MBP/ADV) 50 mL MBP  1 g IntraVENous Q24H    insulin lispro (HUMALOG) injection   SubCUTAneous AC&HS    glucose chewable tablet 16 g  4 Tablet Oral PRN    glucagon (GLUCAGEN) injection 1 mg  1 mg IntraMUSCular PRN    dextrose (D50W) injection syrg 12.5-25 g  25-50 mL IntraVENous PRN    NOREPINephrine (LEVOPHED) 4,000 mcg in dextrose 5% 250 mL infusion  2 mcg/min IntraVENous TITRATE        Vitals:    08/28/21 0415 08/28/21 0647 08/28/21 0745 08/28/21 0800   BP: (!) 134/53  (!) 128/55    Pulse: 64  60 60   Resp: 14  14    Temp: 98.4 °F (36.9 °C)  98.3 °F (36.8 °C)    SpO2: 98%  97%    Weight:  59.4 kg (131 lb)     Height:         Objective:   General: alert awake no acute distress. oriented to self  HEENT: EOMI, no Icterus, no Pallor,  mucosa dry.   Neck: Neck is supple, No JVD  Lungs: breathsounds normal, no respiratory distress on inspection, no rhonchi, no rales,   CVS: heart sounds normal, regular rate and rhythm, 1/6 sm lsb   GI: soft, nontender, normal BS. Extremeties: no cyanosis, + trace edema, bilateral wounds with surrounding redness, warm to touch wounds are superficial,   Neuro: Alert, awake, oriented x1, No new focal deficits, moving all extremeties well. .        Intake and Output:  Current Shift: No intake/output data recorded. Last three shifts: 08/26 1901 - 08/28 0700  In: 4804.4 [P.O.:220; I.V.:4584.4]  Out: 3250 [Urine:3250]      Lab/Data Review:  Recent Results (from the past 24 hour(s))   GLUCOSE, POC    Collection Time: 08/27/21  1:24 PM   Result Value Ref Range    Glucose (POC) 67 (L) 70 - 110 mg/dL    Performed by [address] [facility] [employer], POC    Collection Time: 08/27/21  5:03 PM   Result Value Ref Range    Glucose (POC) 62 (L) 70 - 110 mg/dL    Performed by [address] [place] [address], POC    Collection Time: 08/27/21  7:20 PM   Result Value Ref Range    Glucose (POC) 74 70 - 110 mg/dL    Performed by [employer] [facility] [employer], POC    Collection Time: 08/27/21  9:56 PM   Result Value Ref Range    Glucose (POC) 81 70 - 110 mg/dL    Performed by Ramses Dee    CBC WITH AUTOMATED DIFF    Collection Time: 08/28/21  4:00 AM   Result Value Ref Range    WBC 11.9 4.6 - 13.2 K/uL    RBC 2.67 (L) 4.35 - 5.65 M/uL    HGB 8.1 (L) 13.0 - 16.0 g/dL    HCT 23.1 (L) 36.0 - 48.0 %    MCV 86.5 78.0 - 100.0 FL    MCH 30.3 24.0 - 34.0 PG    MCHC 35.1 31.0 - 37.0 g/dL    RDW 14.7 (H) 11.6 - 14.5 %    PLATELET 320 (H) 638 - 420 K/uL    MPV 12.2 (H) 9.2 - 11.8 FL    NRBC 0.0 0.0  WBC    ABSOLUTE NRBC 0.00 0.00 - 0.01 K/uL    NEUTROPHILS 80 (H) 40 - 73 %    LYMPHOCYTES 11 (L) 21 - 52 %    MONOCYTES 8 3 - 10 %    EOSINOPHILS 0 0 - 5 %    BASOPHILS 0 0 - 2 %    IMMATURE GRANULOCYTES 1 (H) 0 - 0.5 %    ABS. NEUTROPHILS 9.5 (H) 1.8 - 8.0 K/UL    ABS. LYMPHOCYTES 1.2 0.9 - 3.6 K/UL    ABS.  MONOCYTES 1.0 0.05 - 1.2 K/UL    ABS. EOSINOPHILS 0.0 0.0 - 0.4 K/UL    ABS. BASOPHILS 0.0 0.0 - 0.1 K/UL    ABS. IMM. GRANS. 0.1 (H) 0.00 - 0.04 K/UL    DF AUTOMATED     METABOLIC PANEL, COMPREHENSIVE    Collection Time: 08/28/21  4:00 AM   Result Value Ref Range    Sodium 150 (H) 135 - 145 mmol/L    Potassium 3.9 3.2 - 5.1 mmol/L    Chloride 119 (H) 94 - 111 mmol/L    CO2 23 21 - 33 mmol/L    Anion gap 8 mmol/L    Glucose 59 (L) 70 - 110 mg/dL    BUN 96 (H) 9 - 21 mg/dL    Creatinine 1.70 (H) 0.8 - 1.50 mg/dL    BUN/Creatinine ratio 56      GFR est AA 47 ml/min/1.73m2    GFR est non-AA 39 ml/min/1.73m2    Calcium 8.2 (L) 8.5 - 10.5 mg/dL    Bilirubin, total 0.8 0.2 - 1.0 mg/dL    AST (SGOT) 37 17 - 74 U/L    ALT (SGPT) 33 3 - 72 U/L    Alk.  phosphatase 69 38 - 126 U/L    Protein, total 5.3 (L) 6.1 - 8.4 g/dL    Albumin 2.3 (L) 3.5 - 4.7 g/dL    Globulin 3.0 g/dL    A-G Ratio 0.8     MAGNESIUM    Collection Time: 08/28/21  4:00 AM   Result Value Ref Range    Magnesium 2.3 1.7 - 2.8 mg/dL   GLUCOSE, POC    Collection Time: 08/28/21  7:40 AM   Result Value Ref Range    Glucose (POC) 66 (L) 70 - 110 mg/dL    Performed by Juan Carlos Guerra           Signed By: Mary Santa MD     August 28, 2021

## 2021-08-28 NOTE — PROGRESS NOTES
On previous adm, pt was placed in SNF at [place] [address]. He was progressing well with therapy, however, Nuvia, pt's wife, removed him after 1 week, because she did not like the new Covid-19 requirements. CM knows this family well. Benitez Knapp is very frightened when her  is in the hospital, because she can't read, and has very limited understanding. She is also sick with and ambulates very slowly with a walker. They have a very limited income and she depends on him for everything. CM has applied them for [facility] , however, that is pending. Pt's wife requires a lot of patience, when she doesn't understand, she becomes angry, and wants to remove her  form care. She is the POA and she does have the documents which she gave to the nursing staff on last adm, so they should be in the EMR. She doesn't realize how sick he his and thinks she can take care of him at home. CM did check on him after she brought him home, and he was still walking at that time. [employer] did pay for his medications on last adm and CM made sure he got his Eloquis with coupons from the company. CM following.

## 2021-08-28 NOTE — PROGRESS NOTES
[facility]        Progress Note    Name: Liset Maguire   : 1940   MRN: [id number]   Date: 2021    [x]I have reviewed the flowsheet and previous days notes. Events, vitals, medications and notes from last 24 hours reviewed. ASSESSMENT:   -Septic shock  -Acute kidney injury  -Urinary tract infection  -Bradycardia  -Hypothermia  -History of hypertension   PLAN:   -He is currently off all pressors and blood pressure is holding in 266 systolic range  -Appears more alert and awake but still quite confused  -I agree with choice of clindamycin for anaerobic coverage to include possible wound infection. Levaquin has been discontinued. Continue cefepime  -WBCs have improved  -Renal function is improving  -Agree with switch of IV fluids to half-normal saline with rising sodium  -Hold all antihypertensives  --Mild bump in troponin likely related to sepsis and underlying renal failure  -Continue current management as outlined by IM team  -We will keep the femoral central line for now and watch another 24 hours likely can pull it tomorrow  -Continue Eliquis and peptic ulcer disease prophylaxis    -discussed with the nurse and RT.  CCT: 31 min                 Subjective: More alert and awake but still pretty confused.   Underlying dementia    ROS: Unable to obtain due to patients current disposition      Allergy:  Allergies   Allergen Reactions    Oxycontin [Oxycodone] Other (comments)     hallucinations    Vicodin [Hydrocodone-Acetaminophen] Other (comments)        Vital Signs:    Visit Vitals  BP (!) 128/55   Pulse 60   Temp 98.3 °F (36.8 °C)   Resp 14   Ht 5' 6\" (1.676 m)   Wt 59.4 kg (131 lb)   SpO2 97%   BMI 21.14 kg/m²       O2 Device: None (Room air)       Temp (24hrs), Av.7 °F (36.5 °C), Min:93.5 °F (34.2 °C), Max:98.7 °F (37.1 °C)       Patient Vitals for the past 8 hrs:   Temp Pulse Resp BP SpO2   21 0800  60      21 0745 98.3 °F (36.8 °C) 60 14 (!) 128/55 97 %   08/28/21 0415 98.4 °F (36.9 °C) 64 14 (!) 134/53 98 %       Intake/Output:   Last shift:      No intake/output data recorded. Last 3 shifts: 08/26 1901 - 08/28 0700  In: 4804.4 [P.O.:220; I.V.:4584.4]  Out: 3250 [Urine:3250]    Intake/Output Summary (Last 24 hours) at 8/28/2021 1130  Last data filed at 8/28/2021 0647  Gross per 24 hour   Intake 4804.35 ml   Output 3250 ml   Net 1554.35 ml         Physical Exam:  General: alert awake no acute distress. oriented to self  HEENT: EOMI, no Icterus, no Pallor,  mucosa dry. Neck: Neck is supple, No JVD  Lungs: breathsounds normal, no respiratory distress on inspection, no rhonchi, no rales,   CVS: heart sounds normal, regular rate and rhythm, 1/6 sm lsb   GI: soft, nontender, normal BS. Extremeties: no cyanosis, + trace edema, bilateral wounds with surrounding redness, warm to touch wounds are superficial,   Neuro: Alert, awake, oriented x1, No new focal deficits, moving all extremeties well.        DATA:   Current Facility-Administered Medications   Medication Dose Route Frequency    0.45% sodium chloride infusion  125 mL/hr IntraVENous CONTINUOUS    clindamycin (CLEOCIN) capsule 300 mg  300 mg Oral Q8H    sodium chloride (NS) flush 5-10 mL  5-10 mL IntraVENous PRN    sodium chloride (NS) flush 5-40 mL  5-40 mL IntraVENous Q8H    sodium chloride (NS) flush 5-40 mL  5-40 mL IntraVENous PRN    acetaminophen (TYLENOL) tablet 650 mg  650 mg Oral Q6H PRN    Or    acetaminophen (TYLENOL) suppository 650 mg  650 mg Rectal Q6H PRN    polyethylene glycol (MIRALAX) packet 17 g  17 g Oral DAILY PRN    ondansetron (ZOFRAN ODT) tablet 4 mg  4 mg Oral Q8H PRN    Or    ondansetron (ZOFRAN) injection 4 mg  4 mg IntraVENous Q6H PRN    apixaban (ELIQUIS) tablet 2.5 mg  2.5 mg Oral Q12H    dextrose (D50W) injection syrg 12.5-25 g  25-50 mL IntraVENous PRN    cefepime (MAXIPIME) 1 g in 0.9% sodium chloride (MBP/ADV) 50 mL MBP  1 g IntraVENous Q24H    insulin lispro (HUMALOG) injection   SubCUTAneous AC&HS    glucose chewable tablet 16 g  4 Tablet Oral PRN    glucagon (GLUCAGEN) injection 1 mg  1 mg IntraMUSCular PRN    dextrose (D50W) injection syrg 12.5-25 g  25-50 mL IntraVENous PRN    NOREPINephrine (LEVOPHED) 4,000 mcg in dextrose 5% 250 mL infusion  2 mcg/min IntraVENous TITRATE           Labs: Results:   Chemistry Recent Labs     08/28/21  0400 08/27/21  0155   GLU 59* 271*   * 146*   K 3.9 3.3   * 107   CO2 23 25   BUN 96* 140*   CREA 1.70* 1.90*   CA 8.2* 9.4   AGAP 8 14   BUCR 56 74   AP 69  --    TP 5.3*  --    ALB 2.3*  --    GLOB 3.0  --    AGRAT 0.8  --       CBC w/Diff Recent Labs     08/28/21 0400 08/27/21  0155   WBC 11.9 12.0   RBC 2.67* 3.61*   HGB 8.1* 10.8*   HCT 23.1* 31.0*   * 582*   GRANS 80* 91*   LYMPH 11* 4*   EOS 0 0          All Micro Results     Procedure Component Value Units Date/Time    CULTURE, BLOOD [[id number]] Collected: 08/27/21 0420    Order Status: Completed Specimen: Blood Updated: 08/28/21 1129     Special Requests: No Special Requests        Culture result: No growth 1 day       CULTURE, BLOOD [[id number]] Collected: 08/27/21 0540    Order Status: Completed Specimen: Blood Updated: 08/28/21 1129     Special Requests: No Special Requests        Culture result: No growth 1 day       CULTURE, BLOOD [[id number]] Collected: 08/27/21 0545    Order Status: Canceled Specimen: Blood     CULTURE, BLOOD [[id number]]     Order Status: Canceled Specimen: Blood     COVID-19 RAPID TEST [[id number]] Collected: 08/27/21 0145    Order Status: Completed Specimen: Nasopharyngeal Updated: 08/27/21 0245     Specimen source Nasopharynx        COVID-19 rapid test Not Detected        Comment:   Rapid NAAT:  The specimen is NEGATIVE for SARS-CoV-2, the novel coronavirus associated with COVID-19.    Negative results should be treated as presumptive and, if inconsistent with clinical signs and symptoms or necessary for patient management, should be tested with an alternative molecular assay. Negative results do not preclude SARS-CoV-2 infection and should not be used as the sole basis for patient management decisions. This test has been authorized by the FDA under an Emergency Use   Authorization (EUA) for use by authorized laboratories. Fact sheet for Healthcare Providers: ConventionUpdate.co.nz Fact sheet for Patients: ConventionUpdate.co.nz   Methodology: Isothermal Nucleic Acid Amplification         CULTURE, URINE [[id number]] Collected: 08/27/21 0145    Order Status: Completed Specimen: Urine Updated: 08/27/21 0209            Imaging:  No results found.           Drucilla Harada, MD  August 28, 2021  11:30 AM

## 2021-08-28 NOTE — PROGRESS NOTES
Problem: Falls - Risk of  Goal: *Absence of Falls  Description: Document Chiquita Locket Fall Risk and appropriate interventions in the flowsheet. Outcome: Progressing Towards Goal  Note: Fall Risk Interventions:                 Elimination Interventions: Call light in reach, Bed/chair exit alarm              Problem: Pressure Injury - Risk of  Goal: *Prevention of pressure injury  Description: Document Malcom Scale and appropriate interventions in the flowsheet. Outcome: Progressing Towards Goal  Note: Pressure Injury Interventions:  Sensory Interventions: Assess changes in LOC                                         Problem: General Medical Care Plan  Goal: *Vital signs within specified parameters  Outcome: Progressing Towards Goal  Goal: *Optimal pain control at patient's stated goal  Outcome: Progressing Towards Goal  Goal: *Skin integrity maintained  Outcome: Progressing Towards Goal     Problem: Activity Intolerance  Goal: *Oxygen saturation during activity within specified parameters  Outcome: Progressing Towards Goal     Problem: Impaired Skin Integrity/Pressure Injury Treatment  Goal: *Improvement of Existing Pressure Injury  Outcome: Progressing Towards Goal  Goal: *Prevention of pressure injury  Description: Document Malcom Scale and appropriate interventions in the flowsheet.   Outcome: Progressing Towards Goal  Note: Pressure Injury Interventions:  Sensory Interventions: Assess changes in LOC                                         Problem: Nutrition Deficit  Goal: *Optimize nutritional status  Outcome: Not Progressing Towards Goal     Problem: General Medical Care Plan  Goal: *Labs within defined limits  Outcome: Not Progressing Towards Goal  Goal: *Absence of infection signs and symptoms  Outcome: Not Progressing Towards Goal  Goal: *Fluid volume balance  Outcome: Not Progressing Towards Goal  Goal: *Optimize nutritional status  Outcome: Not Progressing Towards Goal  Goal: *Anxiety reduced or absent  Outcome: Not Progressing Towards Goal  Goal: *Progressive mobility and function (eg: ADL's)  Outcome: Not Progressing Towards Goal     Problem: Activity Intolerance  Goal: *Able to remain out of bed as prescribed  Outcome: Not Progressing Towards Goal     Problem:  Body Temperature -  Risk of, Imbalanced  Goal: *Absence of heat stress or hyperthermia signs and symptoms  Outcome: Not Progressing Towards Goal  Goal: *Absence of cold stress or hypothermia signs and symptoms  Outcome: Not Progressing Towards Goal

## 2021-08-29 LAB
ALBUMIN SERPL-MCNC: 2.4 G/DL (ref 3.5–4.7)
ALBUMIN/GLOB SERPL: 0.8 {RATIO}
ALP SERPL-CCNC: 71 U/L (ref 38–126)
ALT SERPL-CCNC: 44 U/L (ref 3–72)
ANION GAP SERPL CALC-SCNC: 9 MMOL/L
AST SERPL W P-5'-P-CCNC: 52 U/L (ref 17–74)
ATRIAL RATE: 56 BPM
BASOPHILS # BLD: 0.1 K/UL (ref 0–0.1)
BASOPHILS NFR BLD: 0 % (ref 0–2)
BILIRUB SERPL-MCNC: 0.8 MG/DL (ref 0.2–1)
BUN SERPL-MCNC: 62 MG/DL (ref 9–21)
BUN/CREAT SERPL: 52
CA-I BLD-MCNC: 8.6 MG/DL (ref 8.5–10.5)
CALCULATED P AXIS, ECG09: -82 DEGREES
CALCULATED R AXIS, ECG10: -48 DEGREES
CALCULATED T AXIS, ECG11: 33 DEGREES
CHLORIDE SERPL-SCNC: 116 MMOL/L (ref 94–111)
CO2 SERPL-SCNC: 23 MMOL/L (ref 21–33)
CREAT SERPL-MCNC: 1.2 MG/DL (ref 0.8–1.5)
DIAGNOSIS, 93000: NORMAL
DIFFERENTIAL METHOD BLD: ABNORMAL
EOSINOPHIL # BLD: 0.3 K/UL (ref 0–0.4)
EOSINOPHIL NFR BLD: 2 % (ref 0–5)
ERYTHROCYTE [DISTWIDTH] IN BLOOD BY AUTOMATED COUNT: 14.9 % (ref 11.6–14.5)
GLOBULIN SER CALC-MCNC: 3.1 G/DL
GLUCOSE BLD STRIP.AUTO-MCNC: 103 MG/DL (ref 70–110)
GLUCOSE BLD STRIP.AUTO-MCNC: 140 MG/DL (ref 70–110)
GLUCOSE BLD STRIP.AUTO-MCNC: 163 MG/DL (ref 70–110)
GLUCOSE BLD STRIP.AUTO-MCNC: 80 MG/DL (ref 70–110)
GLUCOSE SERPL-MCNC: 76 MG/DL (ref 70–110)
HCT VFR BLD AUTO: 26.1 % (ref 36–48)
HGB BLD-MCNC: 9 G/DL (ref 13–16)
IMM GRANULOCYTES # BLD AUTO: 0.1 K/UL (ref 0–0.04)
IMM GRANULOCYTES NFR BLD AUTO: 1 % (ref 0–0.5)
LYMPHOCYTES # BLD: 1.7 K/UL (ref 0.9–3.6)
LYMPHOCYTES NFR BLD: 12 % (ref 21–52)
MAGNESIUM SERPL-MCNC: 2.1 MG/DL (ref 1.7–2.8)
MCH RBC QN AUTO: 30 PG (ref 24–34)
MCHC RBC AUTO-ENTMCNC: 34.5 G/DL (ref 31–37)
MCV RBC AUTO: 87 FL (ref 78–100)
MONOCYTES # BLD: 1 K/UL (ref 0.05–1.2)
MONOCYTES NFR BLD: 7 % (ref 3–10)
NEUTS SEG # BLD: 10.6 K/UL (ref 1.8–8)
NEUTS SEG NFR BLD: 78 % (ref 40–73)
NRBC # BLD: 0 K/UL (ref 0–0.01)
NRBC BLD-RTO: 0 PER 100 WBC
P-R INTERVAL, ECG05: 207 MS
PERFORMED BY, TECHID: ABNORMAL
PERFORMED BY, TECHID: ABNORMAL
PERFORMED BY, TECHID: NORMAL
PERFORMED BY, TECHID: NORMAL
PLATELET # BLD AUTO: 488 K/UL (ref 135–420)
PMV BLD AUTO: 12.1 FL (ref 9.2–11.8)
POTASSIUM SERPL-SCNC: 3.7 MMOL/L (ref 3.2–5.1)
PROT SERPL-MCNC: 5.5 G/DL (ref 6.1–8.4)
Q-T INTERVAL, ECG07: 479 MS
QRS DURATION, ECG06: 155 MS
QTC CALCULATION (BEZET), ECG08: 463 MS
RBC # BLD AUTO: 3 M/UL (ref 4.35–5.65)
SARS-COV-2, COV2NT: NOT DETECTED
SODIUM SERPL-SCNC: 148 MMOL/L (ref 135–145)
VENTRICULAR RATE, ECG03: 56 BPM
WBC # BLD AUTO: 13.7 K/UL (ref 4.6–13.2)

## 2021-08-29 PROCEDURE — 85025 COMPLETE CBC W/AUTO DIFF WBC: CPT

## 2021-08-29 PROCEDURE — 74011250637 HC RX REV CODE- 250/637: Performed by: NURSE PRACTITIONER

## 2021-08-29 PROCEDURE — 93005 ELECTROCARDIOGRAM TRACING: CPT

## 2021-08-29 PROCEDURE — 83735 ASSAY OF MAGNESIUM: CPT

## 2021-08-29 PROCEDURE — 74011250637 HC RX REV CODE- 250/637: Performed by: INTERNAL MEDICINE

## 2021-08-29 PROCEDURE — 74011000250 HC RX REV CODE- 250: Performed by: INTERNAL MEDICINE

## 2021-08-29 PROCEDURE — 74011000258 HC RX REV CODE- 258: Performed by: NURSE PRACTITIONER

## 2021-08-29 PROCEDURE — 82962 GLUCOSE BLOOD TEST: CPT

## 2021-08-29 PROCEDURE — 74011250636 HC RX REV CODE- 250/636: Performed by: NURSE PRACTITIONER

## 2021-08-29 PROCEDURE — 80053 COMPREHEN METABOLIC PANEL: CPT

## 2021-08-29 PROCEDURE — 36415 COLL VENOUS BLD VENIPUNCTURE: CPT

## 2021-08-29 PROCEDURE — 65610000006 HC RM INTENSIVE CARE

## 2021-08-29 PROCEDURE — 74011636637 HC RX REV CODE- 636/637: Performed by: INTERNAL MEDICINE

## 2021-08-29 RX ADMIN — INSULIN LISPRO 2 UNITS: 100 INJECTION, SOLUTION INTRAVENOUS; SUBCUTANEOUS at 16:46

## 2021-08-29 RX ADMIN — SODIUM CHLORIDE 125 ML/HR: 4.5 INJECTION, SOLUTION INTRAVENOUS at 01:41

## 2021-08-29 RX ADMIN — APIXABAN 2.5 MG: 2.5 TABLET, FILM COATED ORAL at 22:56

## 2021-08-29 RX ADMIN — CEFEPIME HYDROCHLORIDE 1 G: 1 INJECTION, POWDER, FOR SOLUTION INTRAMUSCULAR; INTRAVENOUS at 05:18

## 2021-08-29 RX ADMIN — CLINDAMYCIN HYDROCHLORIDE 300 MG: 150 CAPSULE ORAL at 15:04

## 2021-08-29 RX ADMIN — CLINDAMYCIN HYDROCHLORIDE 300 MG: 150 CAPSULE ORAL at 05:17

## 2021-08-29 RX ADMIN — Medication 10 ML: at 05:18

## 2021-08-29 RX ADMIN — APIXABAN 2.5 MG: 2.5 TABLET, FILM COATED ORAL at 08:38

## 2021-08-29 RX ADMIN — Medication 10 ML: at 22:48

## 2021-08-29 RX ADMIN — SODIUM CHLORIDE 125 ML/HR: 4.5 INJECTION, SOLUTION INTRAVENOUS at 14:13

## 2021-08-29 RX ADMIN — SODIUM CHLORIDE 125 ML/HR: 4.5 INJECTION, SOLUTION INTRAVENOUS at 22:15

## 2021-08-29 RX ADMIN — CLINDAMYCIN HYDROCHLORIDE 300 MG: 150 CAPSULE ORAL at 22:56

## 2021-08-29 RX ADMIN — Medication 10 ML: at 15:05

## 2021-08-29 NOTE — PROGRESS NOTES
Verbal shift change report given to MARCELINO Salamanca, RN (oncoming nurse) by DAQUAN Vazquez RN (offgoing nurse). Report included the following information SBAR, Intake/Output and MAR.

## 2021-08-29 NOTE — PROGRESS NOTES
0730-Received care of pt. Pt resting in bed with eyes closed no distress noted. Vitals stable. Bed in lowest position, wheels locked, call bell within reach. 0845-Medication administered. Pt Sitting up in bed eating breakfast at this time. 1145-Pt sitting up in bed eating lunch tray at this time time. No distress noted. 1500-Pt resting in the bed with eyes closed. No distress noted. Wife called and given update on pt.    1800-I's&O's completed.

## 2021-08-29 NOTE — PROGRESS NOTES
@1180 Received care of pt from off going nurse. @8046 PM Assessment completed. A&OX2. Resp even and non-labored   Lungs clear, SATS 99% on RA. Skin warm and dry. Pt has multiple skin tears and abrasions to BUE & BLE.  TLC to right femoral intact. @0010 Pt easily arousal.  Pt turned and repositioned for comfort. CBWR, bed in lowest position. NAD noted. @5661 Am labs drawn from TLC. Pt turned and repositioned. Miles patent draining yellow colored urine.

## 2021-08-29 NOTE — PROGRESS NOTES
Progress Note    Patient: Roxy Ames MRN: [id number]  SSN: [id number]    YOB: 1940  Age: [de-identified] y.o. Sex: male      Admit Date: 8/27/2021    LOS: 2 days     Assessment and Plan:   Septic shock due to urinary tract infection ([facility] [address]  Acute sepsis has resolved. Continue cefepime to cover his urine. .   Discontinued Levaquin given prolonged QT interval.  I have reviewed his EKG   cellulitis of lower ext has IMPROVED. Continue clinda    VLADIMIR (acute kidney injury) ([facility] [address]  Likely 2/2 volume depletion  Renal function is continuing to improve. continue IVF for another liter        Hypothermia  Resolved     Hypertension  stable     Fall  Recurrent  PT/OT consult  Fall precautions  CT head negative for acute intracranial abnormalities     History of DVT (deep vein thrombosis)  Continue Eliquis     DM (diabetes mellitus) ([facility])  Accuchecks, SSI, A1C     Hypernatremia  - encourage free water intake. improved     Elevated cardiac enzymes  There is a slight increase in troponins but I suspect this was due to renal failure. He has no chest pain. He does have a prolonged QT. He has no signs of fluid overload at this time    Episode of Bradycardia  - recheck ekg. Bilateral lower extremity wounds present on admission note these or not decubitus wounds  -Red and warm. Continue cefepime day 2 and will continue clindamycin day 2. Will hold vancomycin given his renal insufficiency          Subjective: The patient reports he feels fine. He has no chest pain shortness of breath or cough. He reports breakfast was excellent. He reports he is moving his bowels.   He has no other complaints today        Current Facility-Administered Medications   Medication Dose Route Frequency    0.45% sodium chloride infusion  125 mL/hr IntraVENous CONTINUOUS    clindamycin (CLEOCIN) capsule 300 mg  300 mg Oral Q8H    sodium chloride (NS) flush 5-10 mL  5-10 mL IntraVENous PRN    sodium chloride (NS) flush 5-40 mL  5-40 mL IntraVENous Q8H    sodium chloride (NS) flush 5-40 mL  5-40 mL IntraVENous PRN    acetaminophen (TYLENOL) tablet 650 mg  650 mg Oral Q6H PRN    Or    acetaminophen (TYLENOL) suppository 650 mg  650 mg Rectal Q6H PRN    polyethylene glycol (MIRALAX) packet 17 g  17 g Oral DAILY PRN    ondansetron (ZOFRAN ODT) tablet 4 mg  4 mg Oral Q8H PRN    Or    ondansetron (ZOFRAN) injection 4 mg  4 mg IntraVENous Q6H PRN    apixaban (ELIQUIS) tablet 2.5 mg  2.5 mg Oral Q12H    dextrose (D50W) injection syrg 12.5-25 g  25-50 mL IntraVENous PRN    cefepime (MAXIPIME) 1 g in 0.9% sodium chloride (MBP/ADV) 50 mL MBP  1 g IntraVENous Q24H    insulin lispro (HUMALOG) injection   SubCUTAneous AC&HS    glucose chewable tablet 16 g  4 Tablet Oral PRN    glucagon (GLUCAGEN) injection 1 mg  1 mg IntraMUSCular PRN    dextrose (D50W) injection syrg 12.5-25 g  25-50 mL IntraVENous PRN    NOREPINephrine (LEVOPHED) 4,000 mcg in dextrose 5% 250 mL infusion  2 mcg/min IntraVENous TITRATE        Vitals:    08/29/21 0350 08/29/21 0613 08/29/21 0734 08/29/21 0800   BP: (!) 174/74  (!) 113/45    Pulse:   (!) 49 67   Resp: 14  16    Temp: 97.7 °F (36.5 °C)  97.8 °F (36.6 °C)    SpO2: 97%      Weight:  55.7 kg (122 lb 11.2 oz)     Height:         Objective:   General: alert awake well-oriented, no acute distress. HEENT: EOMI, no Icterus, no Pallor,  mucosa moist.  Neck: Neck is supple, No JVD  Lungs: breathsounds normal, no respiratory distress on inspection, no rhonchi, no rales,   CVS: heart sounds normal, regular rate and rhythm, no murmurs, no rubs. GI: soft, nontender, normal BS. Extremeties: no cyanosis, no edema,   Neuro: Alert, awake, oriented x3, No new focal deficits, moving all extremeties well. Skin: wounds noted bilateral lower ext. Less red and warm today. .        Intake and Output:  Current Shift: No intake/output data recorded. Last three shifts: 08/27 1901 - 08/29 0700  In: 4458.2 [P.O.:1280;  I.V.:3178.2]  Out: [address] Review:  Recent Results (from the past 24 hour(s))   GLUCOSE, POC    Collection Time: 08/28/21 12:02 PM   Result Value Ref Range    Glucose (POC) 98 70 - 110 mg/dL    Performed by [address] [place] [address], POC    Collection Time: 08/28/21  4:52 PM   Result Value Ref Range    Glucose (POC) 73 70 - 110 mg/dL    Performed by [facility], BASIC    Collection Time: 08/28/21  6:30 PM   Result Value Ref Range    Sodium 146 (H) 135 - 145 mmol/L    Potassium 3.5 3.2 - 5.1 mmol/L    Chloride 117 (H) 94 - 111 mmol/L    CO2 23 21 - 33 mmol/L    Anion gap 6 mmol/L    Glucose 147 (H) 70 - 110 mg/dL    BUN 71 (H) 9 - 21 mg/dL    Creatinine 1.30 0.8 - 1.50 mg/dL    BUN/Creatinine ratio 55      GFR est AA >60 ml/min/1.73m2    GFR est non-AA 53 ml/min/1.73m2    Calcium 8.3 (L) 8.5 - 10.5 mg/dL   GLUCOSE, POC    Collection Time: 08/28/21  9:03 PM   Result Value Ref Range    Glucose (POC) 168 (H) 70 - 110 mg/dL    Performed by Bethann Goltz    MAGNESIUM    Collection Time: 08/29/21  3:22 AM   Result Value Ref Range    Magnesium 2.1 1.7 - 2.8 mg/dL   CBC WITH AUTOMATED DIFF    Collection Time: 08/29/21  3:22 AM   Result Value Ref Range    WBC 13.7 (H) 4.6 - 13.2 K/uL    RBC 3.00 (L) 4.35 - 5.65 M/uL    HGB 9.0 (L) 13.0 - 16.0 g/dL    HCT 26.1 (L) 36.0 - 48.0 %    MCV 87.0 78.0 - 100.0 FL    MCH 30.0 24.0 - 34.0 PG    MCHC 34.5 31.0 - 37.0 g/dL    RDW 14.9 (H) 11.6 - 14.5 %    PLATELET 130 (H) 474 - 420 K/uL    MPV 12.1 (H) 9.2 - 11.8 FL    NRBC 0.0 0.0  WBC    ABSOLUTE NRBC 0.00 0.00 - 0.01 K/uL    NEUTROPHILS 78 (H) 40 - 73 %    LYMPHOCYTES 12 (L) 21 - 52 %    MONOCYTES 7 3 - 10 %    EOSINOPHILS 2 0 - 5 %    BASOPHILS 0 0 - 2 %    IMMATURE GRANULOCYTES 1 (H) 0 - 0.5 %    ABS. NEUTROPHILS 10.6 (H) 1.8 - 8.0 K/UL    ABS. LYMPHOCYTES 1.7 0.9 - 3.6 K/UL    ABS. MONOCYTES 1.0 0.05 - 1.2 K/UL    ABS. EOSINOPHILS 0.3 0.0 - 0.4 K/UL    ABS. BASOPHILS 0.1 0.0 - 0.1 K/UL    ABS. IMM. GRANS. 0.1 (H) 0.00 - 0.04 K/UL    DF AUTOMATED     METABOLIC PANEL, COMPREHENSIVE    Collection Time: 08/29/21  3:22 AM   Result Value Ref Range    Sodium 148 (H) 135 - 145 mmol/L    Potassium 3.7 3.2 - 5.1 mmol/L    Chloride 116 (H) 94 - 111 mmol/L    CO2 23 21 - 33 mmol/L    Anion gap 9 mmol/L    Glucose 76 70 - 110 mg/dL    BUN 62 (H) 9 - 21 mg/dL    Creatinine 1.20 0.8 - 1.50 mg/dL    BUN/Creatinine ratio 52      GFR est AA >60 ml/min/1.73m2    GFR est non-AA 58 ml/min/1.73m2    Calcium 8.6 8.5 - 10.5 mg/dL    Bilirubin, total 0.8 0.2 - 1.0 mg/dL    AST (SGOT) 52 17 - 74 U/L    ALT (SGPT) 44 3 - 72 U/L    Alk.  phosphatase 71 38 - 126 U/L    Protein, total 5.5 (L) 6.1 - 8.4 g/dL    Albumin 2.4 (L) 3.5 - 4.7 g/dL    Globulin 3.1 g/dL    A-G Ratio 0.8     GLUCOSE, POC    Collection Time: 08/29/21  8:41 AM   Result Value Ref Range    Glucose (POC) 80 70 - 110 mg/dL    Performed by Rebeca Kate           Signed By: Eunice Oconnor., MD     August 29, 2021

## 2021-08-29 NOTE — PROGRESS NOTES
Problem: Falls - Risk of  Goal: *Absence of Falls  Description: Document Mitzi Richards Fall Risk and appropriate interventions in the flowsheet. Outcome: Progressing Towards Goal  Note: Fall Risk Interventions:                 Elimination Interventions: Call light in reach, Bed/chair exit alarm              Problem: Pressure Injury - Risk of  Goal: *Prevention of pressure injury  Description: Document Malcom Scale and appropriate interventions in the flowsheet. Outcome: Progressing Towards Goal  Note: Pressure Injury Interventions:  Sensory Interventions: Assess changes in LOC                                         Problem: General Medical Care Plan  Goal: *Vital signs within specified parameters  Outcome: Progressing Towards Goal  Goal: *Labs within defined limits  Outcome: Progressing Towards Goal  Goal: *Absence of infection signs and symptoms  Outcome: Progressing Towards Goal  Goal: *Optimal pain control at patient's stated goal  Outcome: Progressing Towards Goal  Goal: *Skin integrity maintained  Outcome: Progressing Towards Goal     Problem: Body Temperature -  Risk of, Imbalanced  Goal: *Absence of heat stress or hyperthermia signs and symptoms  Outcome: Progressing Towards Goal  Goal: *Absence of cold stress or hypothermia signs and symptoms  Outcome: Progressing Towards Goal     Problem: Impaired Skin Integrity/Pressure Injury Treatment  Goal: *Improvement of Existing Pressure Injury  Outcome: Progressing Towards Goal  Goal: *Prevention of pressure injury  Description: Document Malcom Scale and appropriate interventions in the flowsheet.   Outcome: Progressing Towards Goal  Note: Pressure Injury Interventions:  Sensory Interventions: Assess changes in LOC                                         Problem: Nutrition Deficit  Goal: *Optimize nutritional status  Outcome: Not Progressing Towards Goal     Problem: General Medical Care Plan  Goal: *Fluid volume balance  Outcome: Not Progressing Towards Goal  Goal: *Anxiety reduced or absent  Outcome: Not Progressing Towards Goal  Goal: *Progressive mobility and function (eg: ADL's)  Outcome: Not Progressing Towards Goal

## 2021-08-30 LAB
ALBUMIN SERPL-MCNC: 2.1 G/DL (ref 3.5–4.7)
ALBUMIN/GLOB SERPL: 0.7 {RATIO}
ALP SERPL-CCNC: 69 U/L (ref 38–126)
ALT SERPL-CCNC: 39 U/L (ref 3–72)
ANION GAP SERPL CALC-SCNC: 5 MMOL/L
APPEARANCE UR: CLEAR
AST SERPL W P-5'-P-CCNC: 43 U/L (ref 17–74)
BACTERIA URNS QL MICRO: ABNORMAL /HPF
BASOPHILS # BLD: 0.1 K/UL (ref 0–0.1)
BASOPHILS NFR BLD: 1 % (ref 0–2)
BILIRUB SERPL-MCNC: 0.8 MG/DL (ref 0.2–1)
BILIRUB UR QL: NEGATIVE
BUN SERPL-MCNC: 44 MG/DL (ref 9–21)
BUN/CREAT SERPL: 44
CA-I BLD-MCNC: 8.2 MG/DL (ref 8.5–10.5)
CHLORIDE SERPL-SCNC: 113 MMOL/L (ref 94–111)
CO2 SERPL-SCNC: 25 MMOL/L (ref 21–33)
COLOR UR: YELLOW
CREAT SERPL-MCNC: 1 MG/DL (ref 0.8–1.5)
DIFFERENTIAL METHOD BLD: ABNORMAL
EOSINOPHIL # BLD: 0.4 K/UL (ref 0–0.4)
EOSINOPHIL NFR BLD: 3 % (ref 0–5)
EPITH CASTS URNS QL MICRO: ABNORMAL /LPF (ref 0–20)
ERYTHROCYTE [DISTWIDTH] IN BLOOD BY AUTOMATED COUNT: 14.7 % (ref 11.6–14.5)
GLOBULIN SER CALC-MCNC: 2.9 G/DL
GLUCOSE BLD STRIP.AUTO-MCNC: 179 MG/DL (ref 70–110)
GLUCOSE BLD STRIP.AUTO-MCNC: 238 MG/DL (ref 70–110)
GLUCOSE BLD STRIP.AUTO-MCNC: 84 MG/DL (ref 70–110)
GLUCOSE BLD STRIP.AUTO-MCNC: 93 MG/DL (ref 70–110)
GLUCOSE SERPL-MCNC: 98 MG/DL (ref 70–110)
GLUCOSE UR STRIP.AUTO-MCNC: NEGATIVE MG/DL
HCT VFR BLD AUTO: 23.3 % (ref 36–48)
HCT VFR BLD AUTO: 24.5 % (ref 36–48)
HGB BLD-MCNC: 8.2 G/DL (ref 13–16)
HGB BLD-MCNC: 8.6 G/DL (ref 13–16)
HGB UR QL STRIP: ABNORMAL
IMM GRANULOCYTES # BLD AUTO: 0.1 K/UL (ref 0–0.04)
IMM GRANULOCYTES NFR BLD AUTO: 1 % (ref 0–0.5)
KETONES UR QL STRIP.AUTO: NEGATIVE MG/DL
LEUKOCYTE ESTERASE UR QL STRIP.AUTO: NEGATIVE
LYMPHOCYTES # BLD: 2.2 K/UL (ref 0.9–3.6)
LYMPHOCYTES NFR BLD: 18 % (ref 21–52)
MAGNESIUM SERPL-MCNC: 1.8 MG/DL (ref 1.7–2.8)
MCH RBC QN AUTO: 30.3 PG (ref 24–34)
MCH RBC QN AUTO: 30.4 PG (ref 24–34)
MCHC RBC AUTO-ENTMCNC: 35.1 G/DL (ref 31–37)
MCHC RBC AUTO-ENTMCNC: 35.2 G/DL (ref 31–37)
MCV RBC AUTO: 86 FL (ref 78–100)
MONOCYTES # BLD: 0.9 K/UL (ref 0.05–1.2)
MONOCYTES NFR BLD: 8 % (ref 3–10)
NEUTS SEG # BLD: 8.5 K/UL (ref 1.8–8)
NEUTS SEG NFR BLD: 69 % (ref 40–73)
NITRITE UR QL STRIP.AUTO: NEGATIVE
NRBC # BLD: 0 K/UL (ref 0–0.01)
NRBC BLD-RTO: 0 PER 100 WBC
PERFORMED BY, TECHID: ABNORMAL
PERFORMED BY, TECHID: ABNORMAL
PERFORMED BY, TECHID: NORMAL
PERFORMED BY, TECHID: NORMAL
PH UR STRIP: 5 [PH] (ref 5–8)
PLATELET # BLD AUTO: 450 K/UL (ref 135–420)
PMV BLD AUTO: 11.9 FL (ref 9.2–11.8)
POTASSIUM SERPL-SCNC: 4 MMOL/L (ref 3.2–5.1)
PROT SERPL-MCNC: 5 G/DL (ref 6.1–8.4)
PROT UR STRIP-MCNC: 100 MG/DL
RBC # BLD AUTO: 2.71 M/UL (ref 4.35–5.65)
RBC #/AREA URNS HPF: ABNORMAL /HPF (ref 0–2)
SODIUM SERPL-SCNC: 143 MMOL/L (ref 135–145)
SP GR UR REFRACTOMETRY: 1.01 (ref 1–1.03)
UA: UC IF INDICATED,UAUC: ABNORMAL
UROBILINOGEN UR QL STRIP.AUTO: 0.2 EU/DL (ref 0.2–1)
WBC # BLD AUTO: 12.1 K/UL (ref 4.6–13.2)
WBC URNS QL MICRO: ABNORMAL /HPF (ref 0–4)

## 2021-08-30 PROCEDURE — 83735 ASSAY OF MAGNESIUM: CPT

## 2021-08-30 PROCEDURE — 65270000029 HC RM PRIVATE

## 2021-08-30 PROCEDURE — 97530 THERAPEUTIC ACTIVITIES: CPT

## 2021-08-30 PROCEDURE — 74011000250 HC RX REV CODE- 250: Performed by: INTERNAL MEDICINE

## 2021-08-30 PROCEDURE — 74011250637 HC RX REV CODE- 250/637: Performed by: NURSE PRACTITIONER

## 2021-08-30 PROCEDURE — 81001 URINALYSIS AUTO W/SCOPE: CPT

## 2021-08-30 PROCEDURE — 85025 COMPLETE CBC W/AUTO DIFF WBC: CPT

## 2021-08-30 PROCEDURE — 74011250637 HC RX REV CODE- 250/637: Performed by: INTERNAL MEDICINE

## 2021-08-30 PROCEDURE — 80053 COMPREHEN METABOLIC PANEL: CPT

## 2021-08-30 PROCEDURE — 97161 PT EVAL LOW COMPLEX 20 MIN: CPT

## 2021-08-30 PROCEDURE — 74011250636 HC RX REV CODE- 250/636: Performed by: NURSE PRACTITIONER

## 2021-08-30 PROCEDURE — 97166 OT EVAL MOD COMPLEX 45 MIN: CPT

## 2021-08-30 PROCEDURE — 85018 HEMOGLOBIN: CPT

## 2021-08-30 PROCEDURE — 74011000258 HC RX REV CODE- 258: Performed by: NURSE PRACTITIONER

## 2021-08-30 PROCEDURE — 74011636637 HC RX REV CODE- 636/637: Performed by: INTERNAL MEDICINE

## 2021-08-30 PROCEDURE — 82962 GLUCOSE BLOOD TEST: CPT

## 2021-08-30 PROCEDURE — 51798 US URINE CAPACITY MEASURE: CPT

## 2021-08-30 RX ADMIN — INSULIN LISPRO 2 UNITS: 100 INJECTION, SOLUTION INTRAVENOUS; SUBCUTANEOUS at 11:51

## 2021-08-30 RX ADMIN — APIXABAN 2.5 MG: 2.5 TABLET, FILM COATED ORAL at 08:53

## 2021-08-30 RX ADMIN — CLINDAMYCIN HYDROCHLORIDE 300 MG: 150 CAPSULE ORAL at 15:37

## 2021-08-30 RX ADMIN — CLINDAMYCIN HYDROCHLORIDE 300 MG: 150 CAPSULE ORAL at 22:34

## 2021-08-30 RX ADMIN — Medication 10 ML: at 17:42

## 2021-08-30 RX ADMIN — Medication 10 ML: at 22:35

## 2021-08-30 RX ADMIN — SODIUM CHLORIDE 125 ML/HR: 4.5 INJECTION, SOLUTION INTRAVENOUS at 20:15

## 2021-08-30 RX ADMIN — APIXABAN 2.5 MG: 2.5 TABLET, FILM COATED ORAL at 22:34

## 2021-08-30 RX ADMIN — INSULIN LISPRO 4 UNITS: 100 INJECTION, SOLUTION INTRAVENOUS; SUBCUTANEOUS at 22:35

## 2021-08-30 RX ADMIN — CLINDAMYCIN HYDROCHLORIDE 300 MG: 150 CAPSULE ORAL at 05:33

## 2021-08-30 RX ADMIN — SODIUM CHLORIDE 125 ML/HR: 4.5 INJECTION, SOLUTION INTRAVENOUS at 05:37

## 2021-08-30 RX ADMIN — Medication 10 ML: at 05:34

## 2021-08-30 RX ADMIN — CEFEPIME HYDROCHLORIDE 1 G: 1 INJECTION, POWDER, FOR SOLUTION INTRAMUSCULAR; INTRAVENOUS at 05:34

## 2021-08-30 NOTE — PROGRESS NOTES
CARDIOLOGY PROGRESS NOTE - NP    Patient seen and examined. This is a patient who is followed for bradycardia. He is sleeping at the time of the visit, does not open eyes to voice. He is in no distress. No concerns from nursing staff. No other complaints reported. Telemetry reviewed, there were no events noted in the past 24 hours. He is in sinus bradycardia in the 50s. Occasionally decreases to the 40s while asleep. Pertinent review of systems items noted above, all other systems are negative. Current medications reviewed. PHYSICAL EXAMINATION:  Vital sign assessment reveal a blood pressure of 168/65 and pulse rate of 58. Cardiovascular exam has a heart with a regular rate and rhythm, normal S1/S2. No murmurs, rubs, or gallops. Respiratory exam reveals clear lung sounds, no rales, wheezing, or rhonchi. Abdomen is soft and nontender. Lymphatic exam reveals no edema but numerous scattered skin tears and abrasions to the legs. Neurologic exam is notable for dementia. Recent labs results and imaging reviewed.   Notable findings include:  Recent Results (from the past 24 hour(s))   GLUCOSE, POC    Collection Time: 08/29/21 12:03 PM   Result Value Ref Range    Glucose (POC) 103 70 - 110 mg/dL    Performed by Marlo Viveros    GLUCOSE, POC    Collection Time: 08/29/21  4:42 PM   Result Value Ref Range    Glucose (POC) 163 (H) 70 - 110 mg/dL    Performed by [employer] Felice    GLUCOSE, POC    Collection Time: 08/29/21  9:40 PM   Result Value Ref Range    Glucose (POC) 140 (H) 70 - 110 mg/dL    Performed by Paulino Butler    MAGNESIUM    Collection Time: 08/30/21  4:20 AM   Result Value Ref Range    Magnesium 1.8 1.7 - 2.8 mg/dL   CBC WITH AUTOMATED DIFF    Collection Time: 08/30/21  4:20 AM   Result Value Ref Range    WBC 12.1 4.6 - 13.2 K/uL    RBC 2.71 (L) 4.35 - 5.65 M/uL    HGB 8.2 (L) 13.0 - 16.0 g/dL    HCT 23.3 (L) 36.0 - 48.0 %    MCV 86.0 78.0 - 100.0 FL    MCH 30.3 24.0 - 34.0 PG    MCHC 35.2 31.0 - 37.0 g/dL    RDW 14.7 (H) 11.6 - 14.5 %    PLATELET 889 (H) 135 - 420 K/uL    MPV 11.9 (H) 9.2 - 11.8 FL    NRBC 0.0 0.0  WBC    ABSOLUTE NRBC 0.00 0.00 - 0.01 K/uL    NEUTROPHILS 69 40 - 73 %    LYMPHOCYTES 18 (L) 21 - 52 %    MONOCYTES 8 3 - 10 %    EOSINOPHILS 3 0 - 5 %    BASOPHILS 1 0 - 2 %    IMMATURE GRANULOCYTES 1 (H) 0 - 0.5 %    ABS. NEUTROPHILS 8.5 (H) 1.8 - 8.0 K/UL    ABS. LYMPHOCYTES 2.2 0.9 - 3.6 K/UL    ABS. MONOCYTES 0.9 0.05 - 1.2 K/UL    ABS. EOSINOPHILS 0.4 0.0 - 0.4 K/UL    ABS. BASOPHILS 0.1 0.0 - 0.1 K/UL    ABS. IMM. GRANS. 0.1 (H) 0.00 - 0.04 K/UL    DF AUTOMATED     METABOLIC PANEL, COMPREHENSIVE    Collection Time: 08/30/21  4:20 AM   Result Value Ref Range    Sodium 143 135 - 145 mmol/L    Potassium 4.0 3.2 - 5.1 mmol/L    Chloride 113 (H) 94 - 111 mmol/L    CO2 25 21 - 33 mmol/L    Anion gap 5 mmol/L    Glucose 98 70 - 110 mg/dL    BUN 44 (H) 9 - 21 mg/dL    Creatinine 1.00 0.8 - 1.50 mg/dL    BUN/Creatinine ratio 44      GFR est AA >60 ml/min/1.73m2    GFR est non-AA >60 ml/min/1.73m2    Calcium 8.2 (L) 8.5 - 10.5 mg/dL    Bilirubin, total 0.8 0.2 - 1.0 mg/dL    AST (SGOT) 43 17 - 74 U/L    ALT (SGPT) 39 3 - 72 U/L    Alk. phosphatase 69 38 - 126 U/L    Protein, total 5.0 (L) 6.1 - 8.4 g/dL    Albumin 2.1 (L) 3.5 - 4.7 g/dL    Globulin 2.9 g/dL    A-G Ratio 0.7     GLUCOSE, POC    Collection Time: 08/30/21  7:48 AM   Result Value Ref Range    Glucose (POC) 84 70 - 110 mg/dL    Performed by Henrry Villasenor      Discussed case with Dr. Dennis Cohen, and our impression and recommendations are as follows:  1. Bradycardia: He is in ectopic atrial rhythm alternating with sinus bradycardia, with rate in the 50s. He will initially bradycardic in the 30s-40s on presentation when hypothermic with rectal temp of 81 degrees. Continue telemetry monitoring. Keep serum potassium between 4-5 and serum magnesium > 2. Continue telemetry monitoring. EKGs prn.  Echocardiogram completed on 8/12/21 shows EF of 61% with mild LVH, mild AVR, and mild TR. Will sign off at this time, as his bradycardia is improving. Please reconsult should any acute needs arise. 2. Septic shock: Improving. Due to UTI. He is off Levophed and remains on IV fluids. He is on IV and PO antibiotics. He was significantly hypothermic on arrival with hypotension. Pulm/critical care is following as well. Thank you for involving us in the care of this patient. Please do not hesitate to call me or Dr. Aletha Weinberg if additional questions arise.

## 2021-08-30 NOTE — PROGRESS NOTES
OCCUPATIONAL THERAPY EVALUATION    Patient: Cortney Lambert (41 y.o. male)  Date: 8/30/2021  Primary Diagnosis: Hypothermia [T68. XXXA]  Bradycardia [R00.1]  UTI (urinary tract infection) [N39.0]  Fall [W19. XXXA]       Precautions: IVs, dementia      PLOF: Per chart, pt lives at home with wife and had required limited assist with basic ADLs and mobility     ASSESSMENT :  Based on the objective data described below, the patient presents with declines in ADLs and mobility. Patient will benefit from skilled intervention to address the above impairments. Patient's rehabilitation potential is considered to be Good  Factors which may influence rehabilitation potential include:   []             None noted  [x]             Mental ability/status  [x]             Medical condition  [x]             Home/family situation and support systems  [x]             Safety awareness  [x]             Pain tolerance/management  []             Other:      PLAN :  Recommendations and Planned Interventions:   [x]               Self Care Training                  [x]      Therapeutic Activities  [x]               Functional Mobility Training   []      Cognitive Retraining  [x]               Therapeutic Exercises           [x]      Endurance Activities  []               Balance Training                    [x]      Neuromuscular Re-Education  []               Visual/Perceptual Training     [x]      Home Safety Training  [x]               Patient Education                   []      Family Training/Education  []               Other (comment):    Frequency/Duration: Patient will be followed by occupational therapy 4 times a week to address goals. Discharge Recommendations: Skinny Cooper  Further Equipment Recommendations for Discharge: rolling walker and N/A     SUBJECTIVE:   Patient stated I guess im ready for lunch.     OBJECTIVE DATA SUMMARY:     Past Medical History:   Diagnosis Date    Abnormality of gait     Benign neoplasm of colon     Depression     DM [facility] mellitus) ([facility] [address]     Headache     Hypertension     Nonspecific (abnormal) findings on radiological and other examination of genitourinary organs     Other and unspecified hyperlipidemia     Personal history of fall     Right bundle branch block and left anterior fascicular block      Past Surgical History:   Procedure Laterality Date    COLONOSCOPY N/A 3/5/2018    COLONOSCOPY w/ biopsies w/ polypectomy performed by Nabil Benjamin MD at [facility]    HX CATARACT REMOVAL      HX CHOLECYSTECTOMY      HX COLONOSCOPY  3/12/2015    HX TONSILLECTOMY       Barriers to Learning/Limitations: yes;  cognitive  Compensate with: visual, verbal, tactile, kinesthetic cues/model    Home Situation:   Home Situation  Home Environment: Trailer/mobile home  One/Two Story Residence: One story  Living Alone: No  Support Systems: Spouse/Significant Other/Partner, Friends \ neighbors  Patient Expects to be Discharged to[de-identified] Unknown  Current DME Used/Available at Home: Cane, straight  [x]  Right hand dominant   []  Left hand dominant    Cognitive/Behavioral Status:  Neurologic State: Alert;Eyes open spontaneously  Orientation Level: Appropriate for age;Oriented to person;Oriented to place;Oriented to situation  Cognition: Follows commands       Skin: intact   Edema:none noted   Vision/Perceptual:       WFLs                               Coordination: BUE    WFLs             Balance:    good     Strength: BUE  Good                    Tone & Sensation: BUE  Normal                             Range of Motion: BUE  WFLs                             Functional Mobility and Transfers for ADLs:  Bed Mobility:    mod A            Transfers:    mod A                                     ADL Assessment:     Pt continues to require mod A for basic ADLs and mobility.  Feel pt will benefit from skilled OT to maximize I with ADLs and mobility            Pain:  Pain level pre-treatment: 0/10 Pain level post-treatment: 0/10   Pain Intervention(s): Medication (see MAR); Rest, Ice, Repositioning  Response to intervention: Nurse notified, See doc flow    Activity Tolerance:   Fair     Please refer to the flowsheet for vital signs taken during this treatment. After treatment:   [x] Patient left in no apparent distress sitting up in chair  [x] Patient left in no apparent distress in bed  [x] Call bell left within reach  [] Nursing notified  [] Caregiver present  [] Bed alarm activated    COMMUNICATION/EDUCATION:   [x] Role of Occupational Therapy in the acute care setting  [x] Home safety education was provided and the patient/caregiver indicated understanding. [x] Patient/family have participated as able in goal setting and plan of care. [] Patient/family agree to work toward stated goals and plan of care. [] Patient understands intent and goals of therapy, but is neutral about his/her participation. [] Patient is unable to participate in goal setting and plan of care. Thank you for this referral.  Parish Parra MS, OTR/L        [place] Complexity: History: MEDIUM Complexity : Expanded review of history including physical, cognitive and psychosocial  history ; Examination: MEDIUM Complexity : 3-5 performance deficits relating to physical, cognitive , or psychosocial skils that result in activity limitations and / or participation restrictions; Decision Making:MEDIUM Complexity : Patient may present with comorbidities that affect occupational performnce.  Miniml to moderate modification of tasks or assistance (eg, physical or verbal ) with assesment(s) is necessary to enable patient to complete evaluation

## 2021-08-30 NOTE — PROGRESS NOTES
Bedside shift change report given to TERRECNE Lanier (oncoming nurse) by DAQUAN Hagen RN (offgoing nurse). Report included the following information Kardex, Intake/Output, MAR and Recent Results.

## 2021-08-30 NOTE — PROGRESS NOTES
Spoke with patients wife. Provided update. Voiced concerns regarding discharge. Notified case management. Familiar with patient and wife, following closely to assess discharge needs.

## 2021-08-30 NOTE — PROGRESS NOTES
Comprehensive Nutrition Assessment    Type and Reason for Visit:     Nutrition Recommendations/Plan: 4 CHO choice with cardiac restriction and provide glucerna BID    Nutrition Assessment:  [de-identified] yo male PMH: dementia, type DM, HTN, DVT   fall at home came to ED having sepsis 2/2 UTI but also COVID R/O. Pt with dementia not reliable historian. BMI 20.1  BG elevated 271 then down to 67 modify to 4 CHO choice cardiac diet for consistent CHO servings. Start ONS glucerna due to increased needs related to sepsis. 8/30/2021 PO intake is variable pt with episodes of confusion eating 26-75% of meals on average has glucerna BID ordered. Still receiving antibiotics for UTI and cellulitis. BG is better controlled SSI. No hgb A1c available. Pt having consistent BM    Recent Results (from the past 24 hour(s))   GLUCOSE, POC    Collection Time: 08/29/21  4:42 PM   Result Value Ref Range    Glucose (POC) 163 (H) 70 - 110 mg/dL    Performed by Leveljabier Regulus    GLUCOSE, POC    Collection Time: 08/29/21  9:40 PM   Result Value Ref Range    Glucose (POC) 140 (H) 70 - 110 mg/dL    Performed by Geno Turk    MAGNESIUM    Collection Time: 08/30/21  4:20 AM   Result Value Ref Range    Magnesium 1.8 1.7 - 2.8 mg/dL   CBC WITH AUTOMATED DIFF    Collection Time: 08/30/21  4:20 AM   Result Value Ref Range    WBC 12.1 4.6 - 13.2 K/uL    RBC 2.71 (L) 4.35 - 5.65 M/uL    HGB 8.2 (L) 13.0 - 16.0 g/dL    HCT 23.3 (L) 36.0 - 48.0 %    MCV 86.0 78.0 - 100.0 FL    MCH 30.3 24.0 - 34.0 PG    MCHC 35.2 31.0 - 37.0 g/dL    RDW 14.7 (H) 11.6 - 14.5 %    PLATELET 266 (H) 934 - 420 K/uL    MPV 11.9 (H) 9.2 - 11.8 FL    NRBC 0.0 0.0  WBC    ABSOLUTE NRBC 0.00 0.00 - 0.01 K/uL    NEUTROPHILS 69 40 - 73 %    LYMPHOCYTES 18 (L) 21 - 52 %    MONOCYTES 8 3 - 10 %    EOSINOPHILS 3 0 - 5 %    BASOPHILS 1 0 - 2 %    IMMATURE GRANULOCYTES 1 (H) 0 - 0.5 %    ABS. NEUTROPHILS 8.5 (H) 1.8 - 8.0 K/UL    ABS. LYMPHOCYTES 2.2 0.9 - 3.6 K/UL    ABS. MONOCYTES 0.9 0.05 - 1.2 K/UL    ABS. EOSINOPHILS 0.4 0.0 - 0.4 K/UL    ABS. BASOPHILS 0.1 0.0 - 0.1 K/UL    ABS. IMM. GRANS. 0.1 (H) 0.00 - 0.04 K/UL    DF AUTOMATED     METABOLIC PANEL, COMPREHENSIVE    Collection Time: 08/30/21  4:20 AM   Result Value Ref Range    Sodium 143 135 - 145 mmol/L    Potassium 4.0 3.2 - 5.1 mmol/L    Chloride 113 (H) 94 - 111 mmol/L    CO2 25 21 - 33 mmol/L    Anion gap 5 mmol/L    Glucose 98 70 - 110 mg/dL    BUN 44 (H) 9 - 21 mg/dL    Creatinine 1.00 0.8 - 1.50 mg/dL    BUN/Creatinine ratio 44      GFR est AA >60 ml/min/1.73m2    GFR est non-AA >60 ml/min/1.73m2    Calcium 8.2 (L) 8.5 - 10.5 mg/dL    Bilirubin, total 0.8 0.2 - 1.0 mg/dL    AST (SGOT) 43 17 - 74 U/L    ALT (SGPT) 39 3 - 72 U/L    Alk.  phosphatase 69 38 - 126 U/L    Protein, total 5.0 (L) 6.1 - 8.4 g/dL    Albumin 2.1 (L) 3.5 - 4.7 g/dL    Globulin 2.9 g/dL    A-G Ratio 0.7     GLUCOSE, POC    Collection Time: 08/30/21  7:48 AM   Result Value Ref Range    Glucose (POC) 84 70 - 110 mg/dL    Performed by  [address], POC    Collection Time: 08/30/21 11:45 AM   Result Value Ref Range    Glucose (POC) 179 (H) 70 - 110 mg/dL    Performed by Lois Villegas        Malnutrition Assessment:  Malnutrition Status:  Insufficient data (Pt dementia and COVID rule out)    Context:  Acute illness     Findings of the 6 clinical characteristics of malnutrition:   Energy Intake:  Unable to assess  Weight Loss:  Unable to assess     Body Fat Loss:  Unable to assess,     Muscle Mass Loss:  Unable to assess,    Fluid Accumulation:  Unable to assess,     Strength:  Not performed         Estimated Daily Nutrient Needs:  Energy (kcal): 4421-8962 kcal/day; Weight Used for Energy Requirements: Admission (60 kg)  Protein (g): 60-72 g/day; Weight Used for Protein Requirements: Admission (1-1.2 g/kg)  Fluid (ml/day): 4489-5429 mL/day; Method Used for Fluid Requirements: 1 ml/kcal      Nutrition Related Findings:  fall at home came to ED having sepsis 2/2 UTI but also COVID R/O. Pt with dementia not reliable historian. BMI 20.1      Wounds:    None       Current Nutrition Therapies:  ADULT DIET Regular  ADULT ORAL NUTRITION SUPPLEMENT Breakfast, Dinner; Diabetic Supplement    Anthropometric Measures:  · Height:  5' 6\" (167.6 cm)  · Current Body Wt:  59 kg (130 lb)   · Admission Body Wt:  130 lb    · Usual Body Wt:        · Ideal Body Wt:  142 lbs:  91.5 %   · Adjusted Body Weight:   ; Weight Adjustment for: No adjustment   · Adjusted BMI:       · BMI Category:  Normal weight (BMI 18.5-24. 9)       Nutrition Diagnosis:   · Increased nutrient needs related to increased demand for energy/nutrients (sepsis) as evidenced by other (specify) (sepsis)      Nutrition Interventions:   Food and/or Nutrient Delivery: Modify current diet, Start oral nutrition supplement  Nutrition Education and Counseling: Education not appropriate  Coordination of Nutrition Care: Continue to monitor while inpatient    Goals:  glucose , Hgb A1c < 7, Pt to eat > 75% of meals and supplement, BM q 1-3 days       Nutrition Monitoring and Evaluation:   Behavioral-Environmental Outcomes:    Food/Nutrient Intake Outcomes: Food and nutrient intake, Supplement intake  Physical Signs/Symptoms Outcomes: Biochemical data, Meal time behavior, Weight, Constipation     9/4    Discharge Planning:    Continue current diet, Continue oral nutrition supplement     Electronically signed by Vance Alas on 8/30/2021 at 3:32 PM    Contact: CELIA [phone]

## 2021-08-30 NOTE — PROGRESS NOTES
HOSPITALIST PROGRESS NOTE  Melvin Roque MD, [address]         Daily Progress Note: 8/30/2021      Subjective:     Patient is alert and oriented x4. Slow to react and following minimal commands with severe weakness noted. Blood pressure significantly improved and currently afebrile. Medications reviewed  Current Facility-Administered Medications   Medication Dose Route Frequency    0.45% sodium chloride infusion  125 mL/hr IntraVENous CONTINUOUS    clindamycin (CLEOCIN) capsule 300 mg  300 mg Oral Q8H    sodium chloride (NS) flush 5-10 mL  5-10 mL IntraVENous PRN    sodium chloride (NS) flush 5-40 mL  5-40 mL IntraVENous Q8H    sodium chloride (NS) flush 5-40 mL  5-40 mL IntraVENous PRN    acetaminophen (TYLENOL) tablet 650 mg  650 mg Oral Q6H PRN    Or    acetaminophen (TYLENOL) suppository 650 mg  650 mg Rectal Q6H PRN    polyethylene glycol (MIRALAX) packet 17 g  17 g Oral DAILY PRN    ondansetron (ZOFRAN ODT) tablet 4 mg  4 mg Oral Q8H PRN    Or    ondansetron (ZOFRAN) injection 4 mg  4 mg IntraVENous Q6H PRN    apixaban (ELIQUIS) tablet 2.5 mg  2.5 mg Oral Q12H    dextrose (D50W) injection syrg 12.5-25 g  25-50 mL IntraVENous PRN    cefepime (MAXIPIME) 1 g in 0.9% sodium chloride (MBP/ADV) 50 mL MBP  1 g IntraVENous Q24H    insulin lispro (HUMALOG) injection   SubCUTAneous AC&HS    glucose chewable tablet 16 g  4 Tablet Oral PRN    glucagon (GLUCAGEN) injection 1 mg  1 mg IntraMUSCular PRN    dextrose (D50W) injection syrg 12.5-25 g  25-50 mL IntraVENous PRN       Review of Systems:   A comprehensive review of systems was negative except for that written in the HPI.     Objective:   Physical Exam:     Visit Vitals  BP (!) 138/51 (BP 1 Location: Left upper arm, BP Patient Position: At rest)   Pulse (!) 55   Temp 97.5 °F (36.4 °C)   Resp 18   Ht 5' 6\" (1.676 m)   Wt 61.6 kg (135 lb 11.2 oz) SpO2 99%   BMI 21.90 kg/m²      O2 Device: None (Room air)  Patient Vitals for the past 8 hrs:   Temp Pulse Resp BP SpO2   21 1438 97.5 °F (36.4 °C) (!) 55 18 (!) 138/51 99 %   21 1200 97.7 °F (36.5 °C) 62 18 (!) 168/54 98 %          Temp (24hrs), Av.1 °F (36.7 °C), Min:97.5 °F (36.4 °C), Max:98.5 °F (36.9 °C)    701 - 1900  In: 200 [P.O.:200]  Out: 550 [Urine:550]   1901 -  0700  In: 4763.8 [P.O.:1460; I.V.:3303.8]  Out: 4100 [Urine:4100]    General:  Alert, cooperative, no distress, appears stated age. Lungs:   Clear to auscultation bilaterally. Chest wall:  No tenderness or deformity. Heart:  Regular rate and rhythm, S1, S2 normal, no murmur, click, rub or gallop. Abdomen:   Soft, non-tender. Bowel sounds normal. No masses,  No organomegaly. Extremities: Extremities normal, atraumatic, no cyanosis or edema. Pulses: 2+ and symmetric all extremities. Skin: Skin color, texture, turgor normal. No rashes or lesions   Neurologic: CNII-XII intact. No gross sensory or motor deficits     Data Review:       Recent Days:  Recent Labs     21  0420 21  0322 21  0400   WBC 12.1 13.7* 11.9   HGB 8.2* 9.0* 8.1*   HCT 23.3* 26.1* 23.1*   * 488* 497*     Recent Labs     21  0420 21  0322 21  1830 21  0400 21  0400    148* 146*   < > 150*   K 4.0 3.7 3.5   < > 3.9   * 116* 117*   < > 119*   CO2 25 23 23   < > 23   GLU 98 76 147*   < > 59*   BUN 44* 62* 71*   < > 96*   CREA 1.00 1.20 1.30   < > 1.70*   CA 8.2* 8.6 8.3*   < > 8.2*   MG 1.8 2.1  --   --  2.3   ALB 2.1* 2.4*  --   --  2.3*   TBILI 0.8 0.8  --   --  0.8   ALT 39 44  --   --  33    < > = values in this interval not displayed. No results for input(s): PH, PCO2, PO2, HCO3, FIO2 in the last 72 hours.     24 Hour Results:  Recent Results (from the past 24 hour(s))   GLUCOSE, POC    Collection Time: 21  9:40 PM   Result Value Ref Range    Glucose (POC) 140 (H) 70 - 110 mg/dL    Performed by Paulina Wyatt    MAGNESIUM    Collection Time: 08/30/21  4:20 AM   Result Value Ref Range    Magnesium 1.8 1.7 - 2.8 mg/dL   CBC WITH AUTOMATED DIFF    Collection Time: 08/30/21  4:20 AM   Result Value Ref Range    WBC 12.1 4.6 - 13.2 K/uL    RBC 2.71 (L) 4.35 - 5.65 M/uL    HGB 8.2 (L) 13.0 - 16.0 g/dL    HCT 23.3 (L) 36.0 - 48.0 %    MCV 86.0 78.0 - 100.0 FL    MCH 30.3 24.0 - 34.0 PG    MCHC 35.2 31.0 - 37.0 g/dL    RDW 14.7 (H) 11.6 - 14.5 %    PLATELET 743 (H) 743 - 420 K/uL    MPV 11.9 (H) 9.2 - 11.8 FL    NRBC 0.0 0.0  WBC    ABSOLUTE NRBC 0.00 0.00 - 0.01 K/uL    NEUTROPHILS 69 40 - 73 %    LYMPHOCYTES 18 (L) 21 - 52 %    MONOCYTES 8 3 - 10 %    EOSINOPHILS 3 0 - 5 %    BASOPHILS 1 0 - 2 %    IMMATURE GRANULOCYTES 1 (H) 0 - 0.5 %    ABS. NEUTROPHILS 8.5 (H) 1.8 - 8.0 K/UL    ABS. LYMPHOCYTES 2.2 0.9 - 3.6 K/UL    ABS. MONOCYTES 0.9 0.05 - 1.2 K/UL    ABS. EOSINOPHILS 0.4 0.0 - 0.4 K/UL    ABS. BASOPHILS 0.1 0.0 - 0.1 K/UL    ABS. IMM. GRANS. 0.1 (H) 0.00 - 0.04 K/UL    DF AUTOMATED     METABOLIC PANEL, COMPREHENSIVE    Collection Time: 08/30/21  4:20 AM   Result Value Ref Range    Sodium 143 135 - 145 mmol/L    Potassium 4.0 3.2 - 5.1 mmol/L    Chloride 113 (H) 94 - 111 mmol/L    CO2 25 21 - 33 mmol/L    Anion gap 5 mmol/L    Glucose 98 70 - 110 mg/dL    BUN 44 (H) 9 - 21 mg/dL    Creatinine 1.00 0.8 - 1.50 mg/dL    BUN/Creatinine ratio 44      GFR est AA >60 ml/min/1.73m2    GFR est non-AA >60 ml/min/1.73m2    Calcium 8.2 (L) 8.5 - 10.5 mg/dL    Bilirubin, total 0.8 0.2 - 1.0 mg/dL    AST (SGOT) 43 17 - 74 U/L    ALT (SGPT) 39 3 - 72 U/L    Alk.  phosphatase 69 38 - 126 U/L    Protein, total 5.0 (L) 6.1 - 8.4 g/dL    Albumin 2.1 (L) 3.5 - 4.7 g/dL    Globulin 2.9 g/dL    A-G Ratio 0.7     GLUCOSE, POC    Collection Time: 08/30/21  7:48 AM   Result Value Ref Range    Glucose (POC) 84 70 - 110 mg/dL    Performed by [address] [place] [address], [facility] Collection Time: 08/30/21 11:45 AM   Result Value Ref Range    Glucose (POC) 179 (H) 70 - 110 mg/dL    Performed by Martínez Lindquist POC    Collection Time: 08/30/21  3:26 PM   Result Value Ref Range    Glucose (POC) 93 70 - 110 mg/dL    Performed by Dannielle Hurtado            Assessment/Plan:     Septic shock due to urinary tract infection   Acute sepsis has resolved.  Continue cefepime to cover his urine. .   Discontinued Levaquin given prolonged QT interval.    Cellulitis of lower ext has IMPROVED. Continue clinda     VLADIMIR (acute kidney injury)   Likely 2/2 volume depletion  Renal function is continuing to improve.        Hypothermia  Resolved     Hypertension  stable     Fall  Recurrent  PT/OT consult  Fall precautions  CT head negative for acute intracranial abnormalities     History of DVT (deep vein thrombosis)  Continue Eliquis     DM (diabetes mellitus) (HCC)  Accuchecks, SSI, A1C     Hypernatremia  Improved     Elevated cardiac enzymes  There is a slight increase in troponins but I suspect this was due to renal failure.  He has no chest pain.  He does have a prolonged QT.  He has no signs of fluid overload at this time     Episode of Bradycardia  Stable     Bilateral lower extremity wounds present on admission note these or not decubitus wounds  Continue cefepime day 3 and clindamycin day 3.       Generalized debility  Will require significant physical therapy and potential placement to SNF for further rehab. Care Plan discussed with: Patient/Family, Nursing    Total time spent with patient: 30 minutes. With greater than 50% spent in coordination of care and counseling.     Beth Rangel MD

## 2021-08-30 NOTE — PROGRESS NOTES
0187 - Accepted care of pt from DAQUAN Garcia RN. Pt stable sleeping at this time. No distress noted. 0745 -  Pt assessment complete and pt sat up for breakfast. No distress noted. VSS. DR. Estrada at bedside. 0845 - AM med pass done. Pt stable    1030 - Miles d/c per MD order, CVL D/C to right groin and pressure held for 22 min. Dressing applied. Bath complete and linen changed. Primo Fit placed to Suction. Diaper in place. Sacral redness noted. Dressing applied. VSS. 1130 - OT at bedside. Pt eating lunch   1230 -  IV started in the L wrist #20. New IVF's hung and tubing. Pt turned and repositioned    1320 -  Report called to Caroline Falcon RN. Wife called and updated that pt will be transferred to the floor.      1330- TX'D to [facility]

## 2021-08-30 NOTE — PROGRESS NOTES
PHYSICAL THERAPY EVALUATION    Patient: Jesus Franco (59 y.o. male)  Date: 8/30/2021  Primary Diagnosis: Hypothermia [T68. XXXA]  Bradycardia [R00.1]  UTI (urinary tract infection) [N39.0]  Fall [W19. XXXA]       Precautions: Fall risk    ASSESSMENT :  Based on the objective data described below, the patient presents with BLE weakness and difficulty with transfers/ambulation. Patient would benefit from further P.T. to improve strength, gait, and stair navigation. They would likely benefit from SNF once medically stable. Patient will benefit from skilled intervention to address the above impairments. Patient's rehabilitation potential is considered to be Good  Factors which may influence rehabilitation potential include:   []         None noted  [x]         Mental ability/status  [x]         Medical condition  [x]         Home/family situation and support systems  [x]         Safety awareness  []         Pain tolerance/management  []         Other:      PLAN :  Recommendations and Planned Interventions:   []           Bed Mobility Training             [x]    Neuromuscular Re-Education  [x]           Transfer Training                   []    Orthotic/Prosthetic Training  [x]           Gait Training                          []    Modalities  [x]           Therapeutic Exercises           []    Edema Management/Control  [x]           Therapeutic Activities            []    Family Training/Education  [x]           Patient Education  []           Other (comment):    Frequency/Duration: Patient will be followed by physical therapy 1-2 times per day/4-7 days per week to address goals. Discharge Recommendations: Skinny Cooper  Further Equipment Recommendations for Discharge: N/A     SUBJECTIVE:   Patient stated I'm tired but I guess I'll get up.     OBJECTIVE DATA SUMMARY:     Past Medical History:   Diagnosis Date    Abnormality of gait     Benign neoplasm of colon     Depression     DM [facility]di[facility] mellitus[facility] ([facility] [address]     Headache     Hypertension     Nonspecific (abnormal) findings on radiological and other examination of genitourinary organs     Other and unspecified hyperlipidemia     Personal history of fall     Right bundle branch block and left anterior fascicular block      Past Surgical History:   Procedure Laterality Date    COLONOSCOPY N/A 3/5/2018    COLONOSCOPY w/ biopsies w/ polypectomy performed by Santosh Cardona MD at [facility] ENDOSCOPY    HX CATARACT REMOVAL      HX CHOLECYSTECTOMY      HX COLONOSCOPY  3/12/2015    HX TONSILLECTOMY       Barriers to Learning/Limitations: yes;  cognitive and altered mental status (i.e.Sedation, Confusion)  Compensate with: Verbal Cues  Home Situation:  Home Situation  Home Environment: Trailer/mobile home  One/Two Story Residence: One story  Living Alone: No  Support Systems: Spouse/Significant Other/Partner, Friends \ neighbors  Patient Expects to be Discharged to[de-identified] Unknown  Current DME Used/Available at Home: Cane, straight  Critical Behavior:  Neurologic State: Alert;Eyes open spontaneously  Orientation Level: Appropriate for age;Oriented to person;Oriented to place;Oriented to situation  Cognition: Follows commands     Psychosocial  Patient Behaviors: Calm  Purposeful Interaction: Yes  Pt Identified Daily Priority: Clinical issues (comment)  Caritas Process: Enable jazmin/hope  Caring Interventions: Reassure  Reassure: Informing  Skin Condition/Temp: Cool;Poor turgor (comment)     Skin Integrity:  (scttered abrasions and skin tears noted)  Skin Integumentary  Skin Color: Appropriate for ethnicity  Skin Condition/Temp: Cool;Poor turgor (comment)  Skin Integrity:  (scttered abrasions and skin tears noted)  Turgor: Tenting     Strength:        RUE: 4/5  LUE: 4/5  RLE: 4/5  LLE: 4/5  Tone & Sensation:   Intact      Range Of Motion:          RUE: WFL  LUE:  WFL  RLE:  WFL  LLE:  WFL  Posture:        Functional Mobility:  Bed Mobility:  Rolling: Modified independent  Supine to Sit: Stand-by assistance  Sit to Supine: Minimum assistance  Scooting: Stand-by assistance  Transfers:  Sit to Stand: Stand-by assistance  Stand to Sit: Stand-by assistance           Balance:   Sitting: With support  Standing: With support  Ambulation/Gait Training:  Unable     Stairs:   Unable     Today's Tx:   Pt agrees to PT visit in ICU. Pt wife present for duration of visit. Pt performs bed mobility and can roll independently and go from supine to sit with SBA. Pt educated on HEP and safety awareness. Pain:  Pain level pre-treatment: 0/10   Pain level post-treatment: 0/10   Pain Location: na  Pain Intervention(s) : Medication (see MAR); Rest, Ice, Repositioning  Response to intervention: Nurse notified, See doc flow    Activity Tolerance:   Pt tolerates today's treatment well but reports fatigue after standing for 2 minutes with 1 minute of knee raises. Please refer to the flowsheet for vital signs taken during this treatment. After treatment:   []         Patient left in no apparent distress sitting up in chair  [x]         Patient left in no apparent distress in bed  [x]         Call bell left within reach  [x]         Nursing notified  [x]         Caregiver present  [x]         Bed alarm activated  []         SCDs applied    COMMUNICATION/EDUCATION:   [x]         Role of Physical Therapy in the acute care setting. [x]         Fall prevention education was provided and the patient/caregiver indicated understanding. [x]         Patient/family have participated as able in goal setting and plan of care. [x]         Patient/family agree to work toward stated goals and plan of care. []         Patient understands intent and goals of therapy, but is neutral about his/her participation. []         Patient is unable to participate in goal setting/plan of care: ongoing with therapy staff.  []         Other:     Thank you for this referral.  Pau Munguia, PT, DPT   Time Calculation: 30 mins

## 2021-08-30 NOTE — PROGRESS NOTES
Problem: Falls - Risk of  Goal: *Absence of Falls  Description: Document Mur[place]ice Peaks Fall Risk and appropriate interventions in the flowsheet. Outcome: Progressing Towards Goal  Note: Fall Risk Interventions:                 Elimination Interventions: Call light in reach, Bed/chair exit alarm              Problem: Nutrition Deficit  Goal: *Optimize nutritional status  Outcome: Progressing Towards Goal     Problem: Pressure Injury - Risk of  Goal: *Prevention of pressure injury  Description: Document Malcom Scale and appropriate interventions in the flowsheet. Outcome: Progressing Towards Goal  Note: Pressure Injury Interventions:  Sensory Interventions: Assess changes in LOC                   Nutrition Interventions: Document food/fluid/supplement intake                     Problem: General Medical Care Plan  Goal: *Vital signs within specified parameters  Outcome: Progressing Towards Goal  Goal: *Labs within defined limits  Outcome: Progressing Towards Goal  Goal: *Optimal pain control at patient's stated goal  Outcome: Progressing Towards Goal  Goal: *Skin integrity maintained  Outcome: Progressing Towards Goal  Goal: *Fluid volume balance  Outcome: Progressing Towards Goal     Problem: Activity Intolerance  Goal: *Oxygen saturation during activity within specified parameters  Outcome: Progressing Towards Goal     Problem: Body Temperature -  Risk of, Imbalanced  Goal: *Absence of heat stress or hyperthermia signs and symptoms  Outcome: Progressing Towards Goal     Problem: Impaired Skin Integrity/Pressure Injury Treatment  Goal: *Improvement of Existing Pressure Injury  Outcome: Progressing Towards Goal  Goal: *Prevention of pressure injury  Description: Document Malcom Scale and appropriate interventions in the flowsheet.   Outcome: Progressing Towards Goal  Note: Pressure Injury Interventions:  Sensory Interventions: Assess changes in LOC                   Nutrition Interventions: Document food/fluid/supplement intake                     Problem: General Medical Care Plan  Goal: *Progressive mobility and function (eg: ADL's)  Outcome: Not Progressing Towards Goal     Problem:  Activity Intolerance  Goal: *Able to remain out of bed as prescribed  Outcome: Not Progressing Towards Goal

## 2021-08-30 NOTE — PROGRESS NOTES
[facility]        Progress Note    Name: Gaylon Boxer   : 1940   MRN: [id number]   Date: 2021    [x]I have reviewed the flowsheet and previous days notes. Events, vitals, medications and notes from last 24 hours reviewed. ASSESSMENT:   -Septic shock  -Acute kidney injury  -Urinary tract infection  -Bradycardia  -Hypothermia  -History of hypertension   PLAN:   -He is currently off all pressors and blood pressure is holding in 566 systolic range  -Appears more alert and awake but still quite confused  -I agree with choice of clindamycin for anaerobic coverage to include possible wound infection. Levaquin has been discontinued. Continue cefepime  -WBCs have improved  -Renal function is improving  -Agree with switch of IV fluids to half-normal saline with rising sodium  -Hold all antihypertensives  --Mild bump in troponin likely related to sepsis and underlying renal failure  -Continue current management as outlined by IM team  -We will keep the femoral central line for now and watch another 24 hours likely can pull it tomorrow  -Continue Eliquis and peptic ulcer disease prophylaxis    -discussed with the nurse and RT.  CCT: 31 min                 Subjective: More alert and awake but still pretty confused.   Underlying dementia    ROS: Unable to obtain due to patients current disposition      Allergy:  Allergies   Allergen Reactions    Oxycontin [Oxycodone] Other (comments)     hallucinations    Vicodin [Hydrocodone-Acetaminophen] Other (comments)        Vital Signs:    Visit Vitals  BP (!) 168/65 (BP 1 Location: Left upper arm, BP Patient Position: At rest)   Pulse (!) 58   Temp 98.5 °F (36.9 °C)   Resp 19   Ht 5' 6\" (1.676 m)   Wt 61.6 kg (135 lb 11.2 oz)   SpO2 98%   BMI 21.90 kg/m²       O2 Device: None (Room air)       Temp (24hrs), Av.3 °F (36.8 °C), Min:98.2 °F (36.8 °C), Max:98.5 °F (36.9 °C)       Patient Vitals for the past 8 hrs:   Temp Pulse Resp BP SpO2   08/30/21 0415 98.5 °F (36.9 °C) (!) 58 19 (!) 168/65 98 %   08/30/21 0029 98.3 °F (36.8 °C) (!) 57 16 (!) 146/54 100 %       Intake/Output:   Last shift:      No intake/output data recorded. Last 3 shifts: 08/28 1901 - 08/30 0700  In: 4763.8 [P.O.:1460; I.V.:3303.8]  Out: 4100 [Urine:4100]    Intake/Output Summary (Last 24 hours) at 8/30/2021 0744  Last data filed at 8/30/2021 0630  Gross per 24 hour   Intake 3502.75 ml   Output 2200 ml   Net 1302.75 ml         Physical Exam:  General: alert awake no acute distress. oriented to self  HEENT: EOMI, no Icterus, no Pallor,  mucosa dry. Neck: Neck is supple, No JVD  Lungs: breathsounds normal, no respiratory distress on inspection, no rhonchi, no rales,   CVS: heart sounds normal, regular rate and rhythm, 1/6 sm lsb   GI: soft, nontender, normal BS. Extremeties: no cyanosis, + trace edema, bilateral wounds with surrounding redness, warm to touch wounds are superficial,   Neuro: Alert, awake, oriented x1, No new focal deficits, moving all extremeties well.        DATA:   Current Facility-Administered Medications   Medication Dose Route Frequency    0.45% sodium chloride infusion  125 mL/hr IntraVENous CONTINUOUS    clindamycin (CLEOCIN) capsule 300 mg  300 mg Oral Q8H    sodium chloride (NS) flush 5-10 mL  5-10 mL IntraVENous PRN    sodium chloride (NS) flush 5-40 mL  5-40 mL IntraVENous Q8H    sodium chloride (NS) flush 5-40 mL  5-40 mL IntraVENous PRN    acetaminophen (TYLENOL) tablet 650 mg  650 mg Oral Q6H PRN    Or    acetaminophen (TYLENOL) suppository 650 mg  650 mg Rectal Q6H PRN    polyethylene glycol (MIRALAX) packet 17 g  17 g Oral DAILY PRN    ondansetron (ZOFRAN ODT) tablet 4 mg  4 mg Oral Q8H PRN    Or    ondansetron (ZOFRAN) injection 4 mg  4 mg IntraVENous Q6H PRN    apixaban (ELIQUIS) tablet 2.5 mg  2.5 mg Oral Q12H    dextrose (D50W) injection syrg 12.5-25 g  25-50 mL IntraVENous PRN    cefepime (MAXIPIME) 1 g in 0.9% sodium chloride (MBP/ADV) 50 mL MBP  1 g IntraVENous Q24H    insulin lispro (HUMALOG) injection   SubCUTAneous AC&HS    glucose chewable tablet 16 g  4 Tablet Oral PRN    glucagon (GLUCAGEN) injection 1 mg  1 mg IntraMUSCular PRN    dextrose (D50W) injection syrg 12.5-25 g  25-50 mL IntraVENous PRN           Labs: Results:   Chemistry Recent Labs     08/30/21  0420 08/29/21  0322 08/28/21  1830 08/28/21  0400 08/28/21  0400   GLU 98 76 147*   < > 59*    148* 146*   < > 150*   K 4.0 3.7 3.5   < > 3.9   * 116* 117*   < > 119*   CO2 25 23 23   < > 23   BUN 44* 62* 71*   < > 96*   CREA 1.00 1.20 1.30   < > 1.70*   CA 8.2* 8.6 8.3*   < > 8.2*   AGAP 5 9 6   < > 8   BUCR 44 52 55   < > 56   AP 69 71  --   --  69   TP 5.0* 5.5*  --   --  5.3*   ALB 2.1* 2.4*  --   --  2.3*   GLOB 2.9 3.1  --   --  3.0   AGRAT 0.7 0.8  --   --  0.8    < > = values in this interval not displayed.       CBC w/Diff Recent Labs     08/30/21  0420 08/29/21 0322 08/28/21  0400   WBC 12.1 13.7* 11.9   RBC 2.71* 3.00* 2.67*   HGB 8.2* 9.0* 8.1*   HCT 23.3* 26.1* 23.1*   * 488* 497*   GRANS 69 78* 80*   LYMPH 18* 12* 11*   EOS 3 2 0          All Micro Results     Procedure Component Value Units Date/Time    CULTURE, BLOOD [[id number]] Collected: 08/27/21 0420    Order Status: Completed Specimen: Blood Updated: 08/29/21 1501     Special Requests: No Special Requests        Culture result: No growth 2 days       CULTURE, BLOOD [[id number]] Collected: 08/27/21 0540    Order Status: Completed Specimen: Blood Updated: 08/29/21 1501     Special Requests: No Special Requests        Culture result: No growth 2 days       CULTURE, URINE [[id number]] Collected: 08/27/21 0145    Order Status: Completed Specimen: Urine Updated: 08/28/21 2118     Special Requests: No Special Requests        Culture result: No Growth (<1000 cfu/mL)       CULTURE, BLOOD [[id number]] Collected: 08/27/21 0545    Order Status: Canceled Specimen: Blood     CULTURE, BLOOD [[id number]]     Order Status: Canceled Specimen: Blood     COVID-19 RAPID TEST [[id number]] Collected: 08/27/21 0145    Order Status: Completed Specimen: Nasopharyngeal Updated: 08/27/21 0245     Specimen source Nasopharynx        COVID-19 rapid test Not Detected        Comment:   Rapid NAAT:  The specimen is NEGATIVE for SARS-CoV-2, the novel coronavirus associated with COVID-19. Negative results should be treated as presumptive and, if inconsistent with clinical signs and symptoms or necessary for patient management, should be tested with an alternative molecular assay. Negative results do not preclude SARS-CoV-2 infection and should not be used as the sole basis for patient management decisions. This test has been authorized by the FDA under an Emergency Use   Authorization (EUA) for use by authorized laboratories. Fact sheet for Healthcare Providers: ConventionUpdate.co.nz Fact sheet for Patients: ConventionUpdate.co.nz   Methodology: Isothermal Nucleic Acid Amplification                 Imaging:  No results found.           Do Crow MD  August 30, 2021  11:30 AM

## 2021-08-30 NOTE — PROGRESS NOTES
CM had a long conversation with the pt's wife, Scott Fishman, who states that she wants to take the pt home at [facility]. There will be a meeting held on Wed morning at 10:00 am with Hospice and [facility].

## 2021-08-30 NOTE — PROGRESS NOTES
@4111 Received care of pt from off going nurse. Pt lying in bed with eyes     @1944 PM Assessment completed. A&OX2. Resp even and non-labored. Lungs clear, SATS 99% on RA. Skin warm and dry. Pt has multiple skin tears, bruises and abrasions to BUE & BLE.  TLC to right femoral intact. + pitting edema to BUE & BLE. Miles patent, draining dark yellow colored urine. @2256 Pt took po scheduled meds. Pt given bedtime snack. Pt turned and repositioned for comfort.    @0020 Pt resting quietly in bed with eyes closed. CBWR, bed in lowest position. NAD noted. @0415 AM labs drawn from TLC. Ports changed and flushed per protocol. Abrasions to left arm and LLE, draining, areas cleaned with NS Optifoam Gentle SA dressings applied. Pt turned and repositioned. Heels free floated.

## 2021-08-31 LAB
ALBUMIN SERPL-MCNC: 2.2 G/DL (ref 3.5–4.7)
ALBUMIN/GLOB SERPL: 0.8 {RATIO}
ALP SERPL-CCNC: 73 U/L (ref 38–126)
ALT SERPL-CCNC: 35 U/L (ref 3–72)
ANION GAP SERPL CALC-SCNC: 8 MMOL/L
AST SERPL W P-5'-P-CCNC: 34 U/L (ref 17–74)
BASOPHILS # BLD: 0.1 K/UL (ref 0–0.1)
BASOPHILS NFR BLD: 0 % (ref 0–2)
BILIRUB SERPL-MCNC: 0.4 MG/DL (ref 0.2–1)
BUN SERPL-MCNC: 31 MG/DL (ref 9–21)
BUN/CREAT SERPL: 31
CA-I BLD-MCNC: 8 MG/DL (ref 8.5–10.5)
CHLORIDE SERPL-SCNC: 105 MMOL/L (ref 94–111)
CO2 SERPL-SCNC: 24 MMOL/L (ref 21–33)
CREAT SERPL-MCNC: 1 MG/DL (ref 0.8–1.5)
DIFFERENTIAL METHOD BLD: ABNORMAL
EOSINOPHIL # BLD: 0.4 K/UL (ref 0–0.4)
EOSINOPHIL NFR BLD: 3 % (ref 0–5)
ERYTHROCYTE [DISTWIDTH] IN BLOOD BY AUTOMATED COUNT: 14.6 % (ref 11.6–14.5)
GLOBULIN SER CALC-MCNC: 2.9 G/DL
GLUCOSE BLD STRIP.AUTO-MCNC: 174 MG/DL (ref 70–110)
GLUCOSE BLD STRIP.AUTO-MCNC: 186 MG/DL (ref 70–110)
GLUCOSE BLD STRIP.AUTO-MCNC: 208 MG/DL (ref 70–110)
GLUCOSE BLD STRIP.AUTO-MCNC: 80 MG/DL (ref 70–110)
GLUCOSE SERPL-MCNC: 70 MG/DL (ref 70–110)
HCT VFR BLD AUTO: 25.4 % (ref 36–48)
HGB BLD-MCNC: 8.7 G/DL (ref 13–16)
IMM GRANULOCYTES # BLD AUTO: 0.3 K/UL (ref 0–0.04)
IMM GRANULOCYTES NFR BLD AUTO: 2 % (ref 0–0.5)
LYMPHOCYTES # BLD: 2.8 K/UL (ref 0.9–3.6)
LYMPHOCYTES NFR BLD: 19 % (ref 21–52)
MAGNESIUM SERPL-MCNC: 1.6 MG/DL (ref 1.7–2.8)
MCH RBC QN AUTO: 29.7 PG (ref 24–34)
MCHC RBC AUTO-ENTMCNC: 34.3 G/DL (ref 31–37)
MCV RBC AUTO: 86.7 FL (ref 78–100)
MONOCYTES # BLD: 1.2 K/UL (ref 0.05–1.2)
MONOCYTES NFR BLD: 8 % (ref 3–10)
NEUTS SEG # BLD: 10.1 K/UL (ref 1.8–8)
NEUTS SEG NFR BLD: 68 % (ref 40–73)
NRBC # BLD: 0 K/UL (ref 0–0.01)
NRBC BLD-RTO: 0 PER 100 WBC
PERFORMED BY, TECHID: ABNORMAL
PERFORMED BY, TECHID: NORMAL
PLATELET # BLD AUTO: 447 K/UL (ref 135–420)
PMV BLD AUTO: 11.8 FL (ref 9.2–11.8)
POTASSIUM SERPL-SCNC: 3.9 MMOL/L (ref 3.2–5.1)
PROT SERPL-MCNC: 5.1 G/DL (ref 6.1–8.4)
RBC # BLD AUTO: 2.93 M/UL (ref 4.35–5.65)
SODIUM SERPL-SCNC: 137 MMOL/L (ref 135–145)
WBC # BLD AUTO: 14.8 K/UL (ref 4.6–13.2)

## 2021-08-31 PROCEDURE — 74011636637 HC RX REV CODE- 636/637: Performed by: INTERNAL MEDICINE

## 2021-08-31 PROCEDURE — 74011000258 HC RX REV CODE- 258: Performed by: NURSE PRACTITIONER

## 2021-08-31 PROCEDURE — 80053 COMPREHEN METABOLIC PANEL: CPT

## 2021-08-31 PROCEDURE — 74011000250 HC RX REV CODE- 250: Performed by: NURSE PRACTITIONER

## 2021-08-31 PROCEDURE — 97530 THERAPEUTIC ACTIVITIES: CPT

## 2021-08-31 PROCEDURE — 36415 COLL VENOUS BLD VENIPUNCTURE: CPT

## 2021-08-31 PROCEDURE — 97110 THERAPEUTIC EXERCISES: CPT

## 2021-08-31 PROCEDURE — 65270000029 HC RM PRIVATE

## 2021-08-31 PROCEDURE — 83735 ASSAY OF MAGNESIUM: CPT

## 2021-08-31 PROCEDURE — 74011250637 HC RX REV CODE- 250/637: Performed by: NURSE PRACTITIONER

## 2021-08-31 PROCEDURE — 85025 COMPLETE CBC W/AUTO DIFF WBC: CPT

## 2021-08-31 PROCEDURE — 74011250636 HC RX REV CODE- 250/636: Performed by: NURSE PRACTITIONER

## 2021-08-31 PROCEDURE — 74011250637 HC RX REV CODE- 250/637: Performed by: INTERNAL MEDICINE

## 2021-08-31 PROCEDURE — 82962 GLUCOSE BLOOD TEST: CPT

## 2021-08-31 RX ADMIN — INSULIN LISPRO 4 UNITS: 100 INJECTION, SOLUTION INTRAVENOUS; SUBCUTANEOUS at 21:34

## 2021-08-31 RX ADMIN — Medication 10 ML: at 14:13

## 2021-08-31 RX ADMIN — SODIUM CHLORIDE 75 ML/HR: 4.5 INJECTION, SOLUTION INTRAVENOUS at 08:20

## 2021-08-31 RX ADMIN — APIXABAN 2.5 MG: 2.5 TABLET, FILM COATED ORAL at 08:18

## 2021-08-31 RX ADMIN — APIXABAN 2.5 MG: 2.5 TABLET, FILM COATED ORAL at 21:35

## 2021-08-31 RX ADMIN — SODIUM CHLORIDE 75 ML/HR: 4.5 INJECTION, SOLUTION INTRAVENOUS at 21:38

## 2021-08-31 RX ADMIN — INSULIN LISPRO 2 UNITS: 100 INJECTION, SOLUTION INTRAVENOUS; SUBCUTANEOUS at 17:25

## 2021-08-31 RX ADMIN — INSULIN LISPRO 2 UNITS: 100 INJECTION, SOLUTION INTRAVENOUS; SUBCUTANEOUS at 12:16

## 2021-08-31 RX ADMIN — CLINDAMYCIN HYDROCHLORIDE 300 MG: 150 CAPSULE ORAL at 05:42

## 2021-08-31 RX ADMIN — Medication 10 ML: at 05:43

## 2021-08-31 RX ADMIN — CLINDAMYCIN HYDROCHLORIDE 300 MG: 150 CAPSULE ORAL at 14:13

## 2021-08-31 RX ADMIN — CEFEPIME HYDROCHLORIDE 1 G: 1 INJECTION, POWDER, FOR SOLUTION INTRAMUSCULAR; INTRAVENOUS at 05:41

## 2021-08-31 RX ADMIN — CLINDAMYCIN HYDROCHLORIDE 300 MG: 150 CAPSULE ORAL at 21:34

## 2021-08-31 RX ADMIN — Medication 10 ML: at 21:34

## 2021-08-31 NOTE — PROGRESS NOTES
Scheduled medication given, pt tolerated well. 4U SSI given for blood glucose 238. HS snack given. Miles cath has red urine noted in bag with moderate amount of dark red clots and yellow urine noted in tubing. Will continue to monitor.  CBWR

## 2021-08-31 NOTE — PROGRESS NOTES
OT DAILY TREATMENT NOTE     Patient Name: Chelsie Garner  Date:2021  : 1940  [x]  Patient  Verified  Payor: Antony Gamez / Plan: [place] & B / Product Type: Medicare /    In time:331 Out time: 400  Total Treatment Time (min):29    Treatment Area: Hypothermia [T68. XXXA]  Bradycardia [R00.1]  UTI (urinary tract infection) [N39.0]  Fall [W19. XXXA]    SUBJECTIVE  Pre- tx Pain Level (0-10 scale): 0/10  Post- tx pain level (0/10 scale)0/10  Any medication changes, allergies to medications, adverse drug reactions, diagnosis change, or new procedure performed?: [x] No    [] Yes (see summary sheet for update)  Subjective functional status/changes:   [] No changes reported  Pt seen in hospital room up in chair requesting snack         29 min Therapeutic Activity:  []  See flow sheet :   Rationale: increase ROM, increase strength and improve coordination  to improve the patients ability to complete seated reaching and B hand integration tasks          With   [] TE   [x] TA   [] neuro   [] other: Patient Education: [x] Review HEP    [x] Progressed/Changed HEP based on:   [x] positioning   [] body mechanics   [] transfers   [] heat/ice application   [] Splint wear/care   [] Sensory re-education   [] scar management      [] other:        ASSESSMENT  Progress towards goals/ change in function: pt continues with improvements in alertness and ability to participate in basic ADLs and mobility     Patient will continue to benefit from skilled OT services to address functional mobility deficits, address ROM deficits and address strength deficits to attain remaining goals. [x]  See Plan of Care  []  See progress note/recertification  []  See Discharge Summary         PLAN  [x]  Upgrade activities as tolerated       []  Continue plan of care        []  Discharge due to:_  []  Other:_      Monalisa Houser MS, OTR/L  2021  4:33 PM    No future appointments.

## 2021-08-31 NOTE — PROGRESS NOTES
Problem: Mobility Impaired (Adult and Pediatric)  Goal: *Acute Goals and Plan of Care (Insert Text)  Description: Physical Therapy Goals  Initiated 8/30/2021 and to be accomplished within 7 day(s)  1. Patient will move from supine to sit and sit to supine  and scoot up and down in bed with modified independence. 2.  Patient will transfer from bed to chair and chair to bed with modified independence using the least restrictive device. 3.  Patient will perform sit to stand with supervision/set-up. 4.  Patient will ambulate with minimal assistance/contact guard assist for 50 feet with the least restrictive device. PLOF: Pt Ind with functional mobility and uses 2WW during ambulation       Outcome: Progressing Towards Goal   PHYSICAL THERAPY TREATMENT    Patient: Ayse Rosario (31 y.o. male)  Date: 8/31/2021  Diagnosis: Hypothermia [T68. XXXA]  Bradycardia [R00.1]  UTI (urinary tract infection) [N39.0]  Fall [W19. XXXA] <principal problem not specified>       Precautions:      ASSESSMENT:  Pt is willing to participate with PT. Pt comes to sitting then performed seated exercise. After this Pt performed 2 STS transfers. Pt reported feeing unsure of himself. After a short rest break Pt is able to stand and take steps towards chair. See below for treatment details. Progression toward goals:   [x]      Improving appropriately and progressing toward goals  []      Improving slowly and progressing toward goals  []      Not making progress toward goals and plan of care will be adjusted     PLAN:  Patient continues to benefit from skilled intervention to address the above impairments. Continue treatment per established plan of care. Discharge Recommendations:  Home Health.  Family is discussing hospice   Further Equipment Recommendations for Discharge:  bedside commode, hospital bed, rolling walker, and wheelchair      SUBJECTIVE:   Patient stated \"I would like to sit in a chair\"    OBJECTIVE DATA SUMMARY: Critical Behavior:  Neurologic State: Alert  Orientation Level: Oriented X4  Cognition: Appropriate decision making, Appropriate for age attention/concentration, Appropriate safety awareness, Follows commands     Functional Mobility Training:  Bed Mobility:  Rolling: Modified independent  Supine to Sit: Stand-by assistance     Scooting: Stand-by assistance         Transfers:  Sit to Stand: Stand-by assistance  Stand to Sit: Stand-by assistance        Bed to Chair: Contact guard assistance   Cues to push up from chair. Balance:  Sitting: Intact; With support  Standing: Impaired; With support    Ambulation/Gait Training:  Distance (ft): 5 Feet (ft)  Assistive Device: Walker, rolling  Ambulation - Level of Assistance: Minimal assistance  Decreased foot calvin     Therapeutic Exercises:       EXERCISE   Sets   Reps   Active Active Assist   Passive Self ROM   Comments   Ankle Pumps 1 30  [x] [] [] []    LAQ 1 10  [x] [] [] [] Hold for 5 secs   Seated forward and lateral flexion 1 10 [x] [] [] []          Pain:  Pain level pre-treatment: 8/10  Pain level post-treatment: 8/10   Pain location: Bilateral feet and hands  Pain Intervention(s): None needed, nursing notified      Activity Tolerance:   Please refer to the flowsheet for vital signs taken during this treatment. After treatment:   [x] Patient left in no apparent distress sitting up in chair  [] Patient left in no apparent distress in bed  [x] Call bell left within reach  [x] Nursing notified  [] Caregiver present  [] Bed alarm activated  [] SCDs applied      COMMUNICATION/EDUCATION:   [x]         Role of Physical Therapy in the acute care setting. [x]         Fall prevention education was provided and the patient/caregiver indicated understanding. [x]         Patient/family have participated as able in working toward goals and plan of care. []         Patient/family agree to work toward stated goals and plan of care.   []         Patient understands intent and goals of therapy, but is neutral about his/her participation.   []         Patient is unable to participate in stated goals/plan of care: ongoing with therapy staff.  []         Other:        Irma Hannah, PTA   Time Calculation: 32 mins

## 2021-08-31 NOTE — PROGRESS NOTES
Bladder scanned pt, >736. Pt denies having the urge to void at this time. Spoke to hospitalist who gave order to place james cath for urinary retention. RBV.

## 2021-08-31 NOTE — PROGRESS NOTES
Ammy care and clean brief provided. Repositioned pt with pillows for pressure reduction and comfort. Diet ginger ale given per pt request (pt continues to refuse water when offered.) IVF infusing without issue. Will continue to monitor.  CBWR

## 2021-08-31 NOTE — PROGRESS NOTES
[facility]        Progress Note    Name: Bonney Cranker   : 1940   MRN: [id number]   Date: 2021    [x]I have reviewed the flowsheet and previous days notes. Events, vitals, medications and notes from last 24 hours reviewed. ASSESSMENT:   -Septic shock  -Acute kidney injury  -Urinary tract infection  -Bradycardia  -Hypothermia  -History of hypertension   PLAN:   -He is currently off all pressors and blood pressure is holding in 722 systolic range  -Appears more alert and awake but still quite confused  - Continue cefepime  -WBCs have improved  -Renal function is improving  -Hold all antihypertensives  --Mild bump in troponin likely related to sepsis and underlying renal failure  -Continue current management as outlined by IM team  -Femoral line discontinued  -Continue Eliquis and peptic ulcer disease prophylaxis    -discussed with the nurse and RT. Subjective: More alert and awake    Underlying dementia    ROS: Unable to obtain due to patients current disposition      Allergy:  Allergies   Allergen Reactions    Oxycontin [Oxycodone] Other (comments)     hallucinations    Vicodin [Hydrocodone-Acetaminophen] Other (comments)        Vital Signs:    Visit Vitals  BP (!) 148/56   Pulse (!) 55   Temp 97.6 °F (36.4 °C)   Resp 18   Ht 5' 6\" (1.676 m)   Wt 62.4 kg (137 lb 9.6 oz)   SpO2 97%   BMI 22.21 kg/m²       O2 Device: None (Room air)       Temp (24hrs), Av.3 °F (36.3 °C), Min:96.9 °F (36.1 °C), Max:97.7 °F (36.5 °C)       Patient Vitals for the past 8 hrs:   Temp Pulse Resp BP SpO2   21 0802 97.6 °F (36.4 °C) (!) 55 18 (!) 148/56 97 %   21 0430 96.9 °F (36.1 °C) (!) 57 16 (!) 157/60 96 %   21 0032 97 °F (36.1 °C) 60 16 (!) 145/59 96 %       Intake/Output:   Last shift:      No intake/output data recorded. Last 3 shifts:  1901 -  0700  In: 4543.2 [P.O.:1220;  I.V.:3323.2]  Out: 3450 [Urine:3450]    Intake/Output Summary (Last 24 hours) at 8/31/2021 0826  Last data filed at 8/31/2021 0532  Gross per 24 hour   Intake 3054.17 ml   Output 2550 ml   Net 504.17 ml         Physical Exam:  General: alert awake no acute distress. oriented to self  HEENT: EOMI, no Icterus, no Pallor,  mucosa dry. Neck: Neck is supple, No JVD  Lungs: breathsounds normal, no respiratory distress on inspection, no rhonchi, no rales,   CVS: heart sounds normal, regular rate and rhythm, 1/6 sm lsb   GI: soft, nontender, normal BS. Extremeties: no cyanosis, + trace edema, bilateral wounds with surrounding redness, warm to touch wounds are superficial,   Neuro: Alert, awake, oriented x1, No new focal deficits, moving all extremeties well.        DATA:   Current Facility-Administered Medications   Medication Dose Route Frequency    0.45% sodium chloride infusion  75 mL/hr IntraVENous CONTINUOUS    clindamycin (CLEOCIN) capsule 300 mg  300 mg Oral Q8H    sodium chloride (NS) flush 5-10 mL  5-10 mL IntraVENous PRN    sodium chloride (NS) flush 5-40 mL  5-40 mL IntraVENous Q8H    sodium chloride (NS) flush 5-40 mL  5-40 mL IntraVENous PRN    acetaminophen (TYLENOL) tablet 650 mg  650 mg Oral Q6H PRN    Or    acetaminophen (TYLENOL) suppository 650 mg  650 mg Rectal Q6H PRN    polyethylene glycol (MIRALAX) packet 17 g  17 g Oral DAILY PRN    ondansetron (ZOFRAN ODT) tablet 4 mg  4 mg Oral Q8H PRN    Or    ondansetron (ZOFRAN) injection 4 mg  4 mg IntraVENous Q6H PRN    apixaban (ELIQUIS) tablet 2.5 mg  2.5 mg Oral Q12H    dextrose (D50W) injection syrg 12.5-25 g  25-50 mL IntraVENous PRN    cefepime (MAXIPIME) 1 g in 0.9% sodium chloride (MBP/ADV) 50 mL MBP  1 g IntraVENous Q24H    insulin lispro (HUMALOG) injection   SubCUTAneous AC&HS    glucose chewable tablet 16 g  4 Tablet Oral PRN    glucagon (GLUCAGEN) injection 1 mg  1 mg IntraMUSCular PRN    dextrose (D50W) injection syrg 12.5-25 g  25-50 mL IntraVENous PRN           Labs: Results: Chemistry Recent Labs     08/31/21  0549 08/30/21  0420 08/29/21 0322   GLU 70 98 76    143 148*   K 3.9 4.0 3.7    113* 116*   CO2 24 25 23   BUN 31* 44* 62*   CREA 1.00 1.00 1.20   CA 8.0* 8.2* 8.6   AGAP 8 5 9   BUCR 31 44 52   AP 73 69 71   TP 5.1* 5.0* 5.5*   ALB 2.2* 2.1* 2.4*   GLOB 2.9 2.9 3.1   AGRAT 0.8 0.7 0.8      CBC w/Diff Recent Labs     08/31/21  0549 08/30/21 2211 08/30/21 0420 08/29/21 0322 08/29/21 0322   WBC 14.8*  --  12.1  --  13.7*   RBC 2.93*  --  2.71*  --  3.00*   HGB 8.7* 8.6* 8.2*   < > 9.0*   HCT 25.4* 24.5* 23.3*   < > 26.1*   *  --  450*  --  488*   GRANS 68  --  69  --  78*   LYMPH 19*  --  18*  --  12*   EOS 3  --  3  --  2    < > = values in this interval not displayed. All Micro Results     Procedure Component Value Units Date/Time    CULTURE, BLOOD [[id number]] Collected: 08/27/21 0420    Order Status: Completed Specimen: Blood Updated: 08/30/21 1507     Special Requests: No Special Requests        Culture result: No growth 3 days       CULTURE, BLOOD [[id number]] Collected: 08/27/21 0540    Order Status: Completed Specimen: Blood Updated: 08/30/21 1507     Special Requests: No Special Requests        Culture result: No growth 3 days       CULTURE, URINE [[id number]] Collected: 08/27/21 0145    Order Status: Completed Specimen: Urine Updated: 08/28/21 2118     Special Requests: No Special Requests        Culture result: No Growth (<1000 cfu/mL)       CULTURE, BLOOD [[id number]] Collected: 08/27/21 0545    Order Status: Canceled Specimen: Blood     CULTURE, BLOOD [[id number]]     Order Status: Canceled Specimen: Blood     COVID-19 RAPID TEST [[id number]] Collected: 08/27/21 0145    Order Status: Completed Specimen: Nasopharyngeal Updated: 08/27/21 0245     Specimen source Nasopharynx        COVID-19 rapid test Not Detected        Comment:   Rapid NAAT:  The specimen is NEGATIVE for SARS-CoV-2, the novel coronavirus associated with COVID-19.    Negative results should be treated as presumptive and, if inconsistent with clinical signs and symptoms or necessary for patient management, should be tested with an alternative molecular assay. Negative results do not preclude SARS-CoV-2 infection and should not be used as the sole basis for patient management decisions. This test has been authorized by the FDA under an Emergency Use   Authorization (EUA) for use by authorized laboratories. Fact sheet for Healthcare Providers: ConventionUpdate.co.nz Fact sheet for Patients: ConventionUpdate.co.nz   Methodology: Isothermal Nucleic Acid Amplification                 Imaging:  No results found.           Lorin Magdaleno MD  August 31, 2021  11:30 AM

## 2021-08-31 NOTE — PROGRESS NOTES
HOSPITALIST PROGRESS NOTE  Evita Raya MD, [address]         Daily Progress Note: 8/31/2021      Subjective:     Patient is alert and oriented x4. Slow to react and following minimal commands with severe weakness noted. Blood pressure significantly improved and currently afebrile. Per case management, patient's wife wishes for patient to be discharged home with hospice with case management meeting set up for 9/1/2021 to finalize discharge. Medications reviewed  Current Facility-Administered Medications   Medication Dose Route Frequency    0.45% sodium chloride infusion  75 mL/hr IntraVENous CONTINUOUS    clindamycin (CLEOCIN) capsule 300 mg  300 mg Oral Q8H    sodium chloride (NS) flush 5-10 mL  5-10 mL IntraVENous PRN    sodium chloride (NS) flush 5-40 mL  5-40 mL IntraVENous Q8H    sodium chloride (NS) flush 5-40 mL  5-40 mL IntraVENous PRN    acetaminophen (TYLENOL) tablet 650 mg  650 mg Oral Q6H PRN    Or    acetaminophen (TYLENOL) suppository 650 mg  650 mg Rectal Q6H PRN    polyethylene glycol (MIRALAX) packet 17 g  17 g Oral DAILY PRN    ondansetron (ZOFRAN ODT) tablet 4 mg  4 mg Oral Q8H PRN    Or    ondansetron (ZOFRAN) injection 4 mg  4 mg IntraVENous Q6H PRN    apixaban (ELIQUIS) tablet 2.5 mg  2.5 mg Oral Q12H    dextrose (D50W) injection syrg 12.5-25 g  25-50 mL IntraVENous PRN    insulin lispro (HUMALOG) injection   SubCUTAneous AC&HS    glucose chewable tablet 16 g  4 Tablet Oral PRN    glucagon (GLUCAGEN) injection 1 mg  1 mg IntraMUSCular PRN    dextrose (D50W) injection syrg 12.5-25 g  25-50 mL IntraVENous PRN       Review of Systems:   A comprehensive review of systems was negative except for that written in the HPI.     Objective:   Physical Exam:     Visit Vitals  BP (!) 154/54   Pulse (!) 58   Temp (!) 96.7 °F (35.9 °C)   Resp 18   Ht 5' 6\" (1.676 m)   Wt 62.4 kg (137 lb 9.6 oz)   SpO2 100%   BMI 22.21 kg/m²      O2 Device: None (Room air)  Patient Vitals for the past 8 hrs:   Temp Pulse Resp BP SpO2   21 1558 (!) 96.7 °F (35.9 °C) (!) 58 18 (!) 154/54 100 %          Temp (24hrs), Av °F (36.1 °C), Min:96.7 °F (35.9 °C), Max:97.6 °F (36.4 °C)    No intake/output data recorded.  1901 -  0700  In: 4543.2 [P.O.:1220; I.V.:3323.2]  Out: 3450 [Urine:3450]    General:  Alert, cooperative, no distress, appears stated age. Lungs:   Clear to auscultation bilaterally. Chest wall:  No tenderness or deformity. Heart:  Regular rate and rhythm, S1, S2 normal, no murmur, click, rub or gallop. Abdomen:   Soft, non-tender. Bowel sounds normal. No masses,  No organomegaly. Extremities: Extremities normal, atraumatic, no cyanosis or edema. Pulses: 2+ and symmetric all extremities. Skin: Skin color, texture, turgor normal. No rashes or lesions   Neurologic: CNII-XII intact. No gross sensory or motor deficits     Data Review:       Recent Days:  Recent Labs     21  0549 21  2211 21  0420 21  0322 21  0322   WBC 14.8*  --  12.1  --  13.7*   HGB 8.7* 8.6* 8.2*   < > 9.0*   HCT 25.4* 24.5* 23.3*   < > 26.1*   *  --  450*  --  488*    < > = values in this interval not displayed. Recent Labs     21  0549 21  0420 21  0322    143 148*   K 3.9 4.0 3.7    113* 116*   CO2 24 25 23   GLU 70 98 76   BUN 31* 44* 62*   CREA 1.00 1.00 1.20   CA 8.0* 8.2* 8.6   MG 1.6* 1.8 2.1   ALB 2.2* 2.1* 2.4*   TBILI 0.4 0.8 0.8   ALT 35 39 44     No results for input(s): PH, PCO2, PO2, HCO3, FIO2 in the last 72 hours.     24 Hour Results:  Recent Results (from the past 24 hour(s))   GLUCOSE, POC    Collection Time: 21  7:36 PM   Result Value Ref Range    Glucose (POC) 238 (H) 70 - 110 mg/dL    Performed by [facility] [address] CULTURE    Collection Time: 21  8:53 PM    Specimen: Urine   Result Value Ref Range    Color Yellow      Appearance Clear      Specific gravity 1.013 1.005 - 1.030      pH (UA) 5.0 5.0 - 8.0      Protein 100 (A) Negative mg/dL    Glucose Negative Negative mg/dL    Ketone Negative Negative mg/dL    Bilirubin Negative Negative      Blood Moderate (A) Negative      Urobilinogen 0.2 0.2 - 1.0 EU/dL    Nitrites Negative Negative      Leukocyte Esterase Negative Negative      WBC 0-4 0 - 4 /hpf    RBC 20-50 0 - 2 /hpf    Epithelial cells Few 0 - 20 /lpf    Bacteria 1+ (A) None /hpf    UA:UC IF INDICATED Culture not indicated by UA result Culture not indicated by UA result     HGB & HCT    Collection Time: 08/30/21 10:11 PM   Result Value Ref Range    HGB 8.6 (L) 13.0 - 16.0 g/dL    HCT 24.5 (L) 36.0 - 48.0 %    MCH 30.4 24.0 - 34.0 PG    MCHC 35.1 31.0 - 37.0 g/dL   MAGNESIUM    Collection Time: 08/31/21  5:49 AM   Result Value Ref Range    Magnesium 1.6 (L) 1.7 - 2.8 mg/dL   CBC WITH AUTOMATED DIFF    Collection Time: 08/31/21  5:49 AM   Result Value Ref Range    WBC 14.8 (H) 4.6 - 13.2 K/uL    RBC 2.93 (L) 4.35 - 5.65 M/uL    HGB 8.7 (L) 13.0 - 16.0 g/dL    HCT 25.4 (L) 36.0 - 48.0 %    MCV 86.7 78.0 - 100.0 FL    MCH 29.7 24.0 - 34.0 PG    MCHC 34.3 31.0 - 37.0 g/dL    RDW 14.6 (H) 11.6 - 14.5 %    PLATELET 429 (H) 088 - 420 K/uL    MPV 11.8 9.2 - 11.8 FL    NRBC 0.0 0.0  WBC    ABSOLUTE NRBC 0.00 0.00 - 0.01 K/uL    NEUTROPHILS 68 40 - 73 %    LYMPHOCYTES 19 (L) 21 - 52 %    MONOCYTES 8 3 - 10 %    EOSINOPHILS 3 0 - 5 %    BASOPHILS 0 0 - 2 %    IMMATURE GRANULOCYTES 2 (H) 0 - 0.5 %    ABS. NEUTROPHILS 10.1 (H) 1.8 - 8.0 K/UL    ABS. LYMPHOCYTES 2.8 0.9 - 3.6 K/UL    ABS. MONOCYTES 1.2 0.05 - 1.2 K/UL    ABS. EOSINOPHILS 0.4 0.0 - 0.4 K/UL    ABS. BASOPHILS 0.1 0.0 - 0.1 K/UL    ABS. IMM.  GRANS. 0.3 (H) 0.00 - 0.04 K/UL    DF AUTOMATED     METABOLIC PANEL, COMPREHENSIVE    Collection Time: 08/31/21  5:49 AM   Result Value Ref Range    Sodium 137 135 - 145 mmol/L    Potassium 3.9 3.2 - 5.1 mmol/L    Chloride 105 94 - 111 mmol/L    CO2 24 21 - 33 mmol/L    Anion gap 8 mmol/L    Glucose 70 70 - 110 mg/dL    BUN 31 (H) 9 - 21 mg/dL    Creatinine 1.00 0.8 - 1.50 mg/dL    BUN/Creatinine ratio 31      GFR est AA >60 ml/min/1.73m2    GFR est non-AA >60 ml/min/1.73m2    Calcium 8.0 (L) 8.5 - 10.5 mg/dL    Bilirubin, total 0.4 0.2 - 1.0 mg/dL    AST (SGOT) 34 17 - 74 U/L    ALT (SGPT) 35 3 - 72 U/L    Alk. phosphatase 73 38 - 126 U/L    Protein, total 5.1 (L) 6.1 - 8.4 g/dL    Albumin 2.2 (L) 3.5 - 4.7 g/dL    Globulin 2.9 g/dL    A-G Ratio 0.8     GLUCOSE, POC    Collection Time: 08/31/21  7:40 AM   Result Value Ref Range    Glucose (POC) 80 70 - 110 mg/dL    Performed by [employer], POC    Collection Time: 08/31/21 11:53 AM   Result Value Ref Range    Glucose (POC) 186 (H) 70 - 110 mg/dL    Performed by [employer], POC    Collection Time: 08/31/21  3:37 PM   Result Value Ref Range    Glucose (POC) 174 (H) 70 - 110 mg/dL    Performed by [facility]            Assessment/Plan:     Septic shock due to urinary tract infection   Acute sepsis has resolved.  Continue cefepime to cover his urine. .   Discontinued Levaquin given prolonged QT interval.    Cellulitis of lower ext has I improved.  Continue clinda     VLADIMIR (acute kidney injury)   Likely 2/2 volume depletion  Renal function is continuing to improve.        Hypothermia  Resolved     Hypertension  stable     Fall  Recurrent  PT/OT consult  Fall precautions  CT head negative for acute intracranial abnormalities     History of DVT (deep vein thrombosis)  Continue Eliquis     DM (diabetes mellitus) (HCC)  Accuchecks, SSI, A1C     Hypernatremia  Improved     Elevated cardiac enzymes  There is a slight increase in troponins but I suspect this was due to renal failure.  He has no chest pain.  He does have a prolonged QT.  He has no signs of fluid overload at this time     Episode of Bradycardia  Stable     Bilateral lower extremity wounds present on admission note these or not decubitus wounds  Continue clindamycin day 4.     Completed 4 days of cefepime. Generalized debility  Per patient's wife she would like to take patient home with home hospice with meeting set for 9/1/2021 for further evaluation and plan for discharge. Care Plan discussed with: Patient/Family, Nursing    Total time spent with patient: 30 minutes. With greater than 50% spent in coordination of care and counseling.     Yesy Clark MD

## 2021-08-31 NOTE — PROGRESS NOTES
Pt repositioned for comfort by PCT. Brief clean and dry. Yellow urine noted in james cath tubing. Pt ate 100% HS snack will continue to monitor.  CBWR

## 2021-08-31 NOTE — PROGRESS NOTES
17F james placed. Hospitalist made aware of hematuria and weeping and pitting edema in BUE. Clement Fly  Hospitalist to place orders

## 2021-09-01 LAB
ALBUMIN SERPL-MCNC: 2.2 G/DL (ref 3.5–4.7)
ALBUMIN/GLOB SERPL: 0.7 {RATIO}
ALP SERPL-CCNC: 72 U/L (ref 38–126)
ALT SERPL-CCNC: 30 U/L (ref 3–72)
ANION GAP SERPL CALC-SCNC: 5 MMOL/L
AST SERPL W P-5'-P-CCNC: 27 U/L (ref 17–74)
BASOPHILS # BLD: 0.1 K/UL (ref 0–0.1)
BASOPHILS NFR BLD: 0 % (ref 0–2)
BILIRUB SERPL-MCNC: 0.4 MG/DL (ref 0.2–1)
BUN SERPL-MCNC: 24 MG/DL (ref 9–21)
BUN/CREAT SERPL: 30
CA-I BLD-MCNC: 8 MG/DL (ref 8.5–10.5)
CHLORIDE SERPL-SCNC: 107 MMOL/L (ref 94–111)
CO2 SERPL-SCNC: 25 MMOL/L (ref 21–33)
CREAT SERPL-MCNC: 0.8 MG/DL (ref 0.8–1.5)
DIFFERENTIAL METHOD BLD: ABNORMAL
EOSINOPHIL # BLD: 0.3 K/UL (ref 0–0.4)
EOSINOPHIL NFR BLD: 2 % (ref 0–5)
ERYTHROCYTE [DISTWIDTH] IN BLOOD BY AUTOMATED COUNT: 14.4 % (ref 11.6–14.5)
GLOBULIN SER CALC-MCNC: 3 G/DL
GLUCOSE BLD STRIP.AUTO-MCNC: 147 MG/DL (ref 70–110)
GLUCOSE BLD STRIP.AUTO-MCNC: 200 MG/DL (ref 70–110)
GLUCOSE BLD STRIP.AUTO-MCNC: 201 MG/DL (ref 70–110)
GLUCOSE BLD STRIP.AUTO-MCNC: 81 MG/DL (ref 70–110)
GLUCOSE SERPL-MCNC: 72 MG/DL (ref 70–110)
HCT VFR BLD AUTO: 26.2 % (ref 36–48)
HGB BLD-MCNC: 9.1 G/DL (ref 13–16)
IMM GRANULOCYTES # BLD AUTO: 0.3 K/UL (ref 0–0.04)
IMM GRANULOCYTES NFR BLD AUTO: 2 % (ref 0–0.5)
LYMPHOCYTES # BLD: 2.2 K/UL (ref 0.9–3.6)
LYMPHOCYTES NFR BLD: 14 % (ref 21–52)
MAGNESIUM SERPL-MCNC: 1.6 MG/DL (ref 1.7–2.8)
MCH RBC QN AUTO: 30 PG (ref 24–34)
MCHC RBC AUTO-ENTMCNC: 34.7 G/DL (ref 31–37)
MCV RBC AUTO: 86.5 FL (ref 78–100)
MONOCYTES # BLD: 1.3 K/UL (ref 0.05–1.2)
MONOCYTES NFR BLD: 8 % (ref 3–10)
NEUTS SEG # BLD: 11.4 K/UL (ref 1.8–8)
NEUTS SEG NFR BLD: 74 % (ref 40–73)
NRBC # BLD: 0 K/UL (ref 0–0.01)
NRBC BLD-RTO: 0 PER 100 WBC
PERFORMED BY, TECHID: ABNORMAL
PERFORMED BY, TECHID: NORMAL
PLATELET # BLD AUTO: 429 K/UL (ref 135–420)
PMV BLD AUTO: 12 FL (ref 9.2–11.8)
POTASSIUM SERPL-SCNC: 4.5 MMOL/L (ref 3.2–5.1)
PROT SERPL-MCNC: 5.2 G/DL (ref 6.1–8.4)
RBC # BLD AUTO: 3.03 M/UL (ref 4.35–5.65)
SODIUM SERPL-SCNC: 137 MMOL/L (ref 135–145)
WBC # BLD AUTO: 15.5 K/UL (ref 4.6–13.2)

## 2021-09-01 PROCEDURE — 65270000029 HC RM PRIVATE

## 2021-09-01 PROCEDURE — 97530 THERAPEUTIC ACTIVITIES: CPT

## 2021-09-01 PROCEDURE — 74011250637 HC RX REV CODE- 250/637: Performed by: INTERNAL MEDICINE

## 2021-09-01 PROCEDURE — 80053 COMPREHEN METABOLIC PANEL: CPT

## 2021-09-01 PROCEDURE — 83735 ASSAY OF MAGNESIUM: CPT

## 2021-09-01 PROCEDURE — 74011000250 HC RX REV CODE- 250: Performed by: NURSE PRACTITIONER

## 2021-09-01 PROCEDURE — 82962 GLUCOSE BLOOD TEST: CPT

## 2021-09-01 PROCEDURE — 36415 COLL VENOUS BLD VENIPUNCTURE: CPT

## 2021-09-01 PROCEDURE — 74011250637 HC RX REV CODE- 250/637: Performed by: NURSE PRACTITIONER

## 2021-09-01 PROCEDURE — 74011636637 HC RX REV CODE- 636/637: Performed by: INTERNAL MEDICINE

## 2021-09-01 PROCEDURE — 85025 COMPLETE CBC W/AUTO DIFF WBC: CPT

## 2021-09-01 RX ADMIN — APIXABAN 2.5 MG: 2.5 TABLET, FILM COATED ORAL at 08:22

## 2021-09-01 RX ADMIN — INSULIN LISPRO 4 UNITS: 100 INJECTION, SOLUTION INTRAVENOUS; SUBCUTANEOUS at 11:21

## 2021-09-01 RX ADMIN — CLINDAMYCIN HYDROCHLORIDE 300 MG: 150 CAPSULE ORAL at 05:31

## 2021-09-01 RX ADMIN — CLINDAMYCIN HYDROCHLORIDE 300 MG: 150 CAPSULE ORAL at 21:14

## 2021-09-01 RX ADMIN — APIXABAN 2.5 MG: 2.5 TABLET, FILM COATED ORAL at 21:14

## 2021-09-01 RX ADMIN — SODIUM CHLORIDE 75 ML/HR: 4.5 INJECTION, SOLUTION INTRAVENOUS at 09:47

## 2021-09-01 RX ADMIN — INSULIN LISPRO 4 UNITS: 100 INJECTION, SOLUTION INTRAVENOUS; SUBCUTANEOUS at 21:14

## 2021-09-01 RX ADMIN — SODIUM CHLORIDE 75 ML/HR: 4.5 INJECTION, SOLUTION INTRAVENOUS at 22:36

## 2021-09-01 RX ADMIN — Medication 10 ML: at 16:45

## 2021-09-01 RX ADMIN — CLINDAMYCIN HYDROCHLORIDE 300 MG: 150 CAPSULE ORAL at 13:19

## 2021-09-01 NOTE — PROGRESS NOTES
Wound care completed. Skin tear cleaned and mepilex reapplied. Call bell in reached, bed in lowest position and alarmed.

## 2021-09-01 NOTE — PROGRESS NOTES
Pt. Wife expressed concern about swelling to right upper arm. Dressing noted to area that was tight, covering an old venipuncture site. Dressing removed and extremity elevated. Dressing was clean and dry upon removal. Will continue to monitor.

## 2021-09-01 NOTE — PROGRESS NOTES
Attempt to treat pt at 1030 and 1406, he refused both times stating he just wanted to stay in the bed and he did not want to exercise.   Nura Ornelas, PT, DPT

## 2021-09-01 NOTE — PROGRESS NOTES
Pt has remained stable this shift. Pt denies any pain or discomfort. Call  Alexis Nap remains in reach and safety maintained.

## 2021-09-01 NOTE — PROGRESS NOTES
Problem: Falls - Risk of  Goal: *Absence of Falls  Description: Document Valerie Tolbert Fall Risk and appropriate interventions in the flowsheet. Outcome: Progressing Towards Goal  Note: Fall Risk Interventions:  Mobility Interventions: PT Consult for mobility concerns    Mentation Interventions: Adequate sleep, hydration, pain control, Bed/chair exit alarm, Door open when patient unattended, More frequent rounding, Reorient patient, Room close to nurse's station, Update white board    Medication Interventions: Bed/chair exit alarm    Elimination Interventions: Call light in reach, Toileting schedule/hourly rounds    History of Falls Interventions: Room close to nurse's station         Problem: Patient Education: Go to Patient Education Activity  Goal: Patient/Family Education  Outcome: Progressing Towards Goal     Problem: Nutrition Deficit  Goal: *Optimize nutritional status  Outcome: Progressing Towards Goal     Problem: Pressure Injury - Risk of  Goal: *Prevention of pressure injury  Description: Document Malcom Scale and appropriate interventions in the flowsheet. Outcome: Progressing Towards Goal  Note: Pressure Injury Interventions:  Sensory Interventions: Float heels    Moisture Interventions: Absorbent underpads, Apply protective barrier, creams and emollients, Internal/External urinary devices, Minimize layers    Activity Interventions: PT/OT evaluation    Mobility Interventions: Float heels, Turn and reposition approx.  every two hours(pillow and wedges)    Nutrition Interventions: Document food/fluid/supplement intake    Friction and Shear Interventions: Apply protective barrier, creams and emollients, Minimize layers

## 2021-09-01 NOTE — PROGRESS NOTES
Pt wife at bedside. Concerned pt going before the weekend because she does not have things ready. Meeting with hospice later on this morning to discuss.

## 2021-09-01 NOTE — PROGRESS NOTES
conducted an initial consultation and Spiritual Assessment for Joan Campbell, who is a [de-identified] y.o.,male. Patients Primary Language is: [place]. According to the patients EMR Sikh Affiliation is: No preference. The reason the Patient came to the hospital is:   Patient Active Problem List    Diagnosis Date Noted    Bradycardia 08/27/2021    Hypothermia 08/27/2021    UTI (urinary tract infection) 08/27/2021    Fall 08/27/2021    Septic shock due to urinary tract infection ([facility] 08/27/2021    DM (diabetes mellitus) ([facility]     History of DVT (deep vein thrombosis)     [facility]     Troponin I above reference range 08/11/2021    Failure to thrive (0-17) 08/10/2021    Multiple falls 08/10/2021    Ambulatory dysfunction 08/10/2021    Syncope 08/10/2021    Hypertension 08/10/2021    Hypercholesteremia 08/10/2021        The  provided the following Interventions:  Initiated a relationship of care and support. Explored issues of jazmin, belief, spirituality and Amish/ritual needs while hospitalized. Listened empathically. Provided chaplaincy education. Provided information about Spiritual Care Services. Offered assurance of continued prayers on patient's behalf. Chart reviewed. The following outcomes where achieved:  Patient expressed gratitude for 's visit. Assessment:  Patient does not have any Amish/cultural needs that will affect patients preferences in health care. There are no spiritual or Amish issues which require intervention at this time. Plan:  Chaplains will continue to follow and will provide pastoral care on an as needed/requested basis.  recommends bedside caregivers page  on duty if patient shows signs of acute spiritual or emotional distress.         [address] [facility].   [phone]

## 2021-09-01 NOTE — PROGRESS NOTES
Received care of pt lying in bed watching TV. Routine assessment and vs completed. Pt denies any pain or discomfort at this time. Call  Twylla Come in reach and bed in lowest position.

## 2021-09-01 NOTE — PROGRESS NOTES
Hospitalist Progress Note             Date of Service:  2021  NAME:  Benjamin Poole  :  1940  MRN:  [id number]    Assessment/Plan:      Septic shock due to urinary tract infection   Acute sepsis has resolved.  sp cefepime  Discontinued Levaquin given prolonged QT interval.    Cellulitis of lower ext has I improved. Continue clinda     VLADIMIR (acute kidney injury)   Likely 2/2 volume depletion  Renal function is continuing to improve.        Hypothermia  Resolved     Hypertension  stable     Fall  Recurrent  PT/OT consult  Fall precautions  CT head negative for acute intracranial abnormalities     History of DVT (deep vein thrombosis)  Continue Eliquis     DM (diabetes mellitus) ([facility])  Accuchecks, SSI, A1C     Hypernatremia  Improved     Elevated cardiac enzymes  There is a slight increase in troponins but I suspect this was due to renal failure.  He has no chest pain.  He does have a prolonged QT.  He has no signs of fluid overload at this time     Episode of Bradycardia  Stable     Bilateral lower extremity wounds present on admission note these or not decubitus wounds  Continue clindamycin day 4.     Completed 4 days of cefepime.     Generalized debility  Per patient's wife she would like to take patient home with home hospice with meeting set for 2021 for further evaluation and plan for discharge. Planning for home with home hospice in next 2 days      Hospital Problems  Date Reviewed: 2017        Codes Class Noted POA    Bradycardia ICD-10-CM: R00.1  ICD-9-CM: 427.89  2021 Unknown        Hypothermia ICD-10-CM: T68. Letitia Greeneer  ICD-9-CM: 991.6  2021 Unknown        Fall ICD-10-CM: W19. Shereenterrenceshahzad Vicki  ICD-9-CM: E888.9  2021 Unknown        Septic shock due to urinary tract infection ([facility] ICD-10-CM: A41.9, R65.21, N39.0  ICD-9-CM: 599.0, 995.92, 785.52  2021 Unknown        DM (diabetes mellitus) ([facility] ICD-10-CM: E11.9  ICD-9-CM: 250.00  Unknown Unknown        History of DVT (deep vein thrombosis) ICD-10-CM: Z56.542  ICD-9-CM: V12.51  Unknown Unknown        VLADIMIR (acute kidney injury) ([facility] ICD-10-CM: N17.9  ICD-9-CM: 065. 9  Unknown Unknown        Hypertension ICD-10-CM: I10  ICD-9-CM: 401.9  8/10/2021 Yes                Review of Systems:   Review of systems not obtained due to patient factors. Vital Signs:    Last 24hrs VS reviewed since prior progress note. Most recent are:  Visit Vitals  BP (!) 146/48 (BP 1 Location: Right upper arm, BP Patient Position: At rest)   Pulse 62   Temp 97.4 °F (36.3 °C)   Resp 17   Ht 5' 6\" (1.676 m)   Wt 63.8 kg (140 lb 10.5 oz)   SpO2 100%   BMI 22.70 kg/m²         Intake/Output Summary (Last 24 hours) at 9/1/2021 1416  Last data filed at 9/1/2021 0932  Gross per 24 hour   Intake 600 ml   Output 4250 ml   Net -3650 ml        Physical Examination:             General:  Alert, cooperative, no distress, appears stated age. Lungs:   Clear to auscultation bilaterally. Chest wall:  No tenderness or deformity. Heart:  Regular rate and rhythm, S1, S2 normal, no murmur, click, rub or gallop. Abdomen:   Soft, non-tender. Bowel sounds normal. No masses,  No organomegaly. Extremities: Extremities normal, atraumatic, no cyanosis or edema. Pulses: 2+ and symmetric all extremities. Skin: Skin color, texture, turgor normal. No rashes or lesions   Neurologic: CNII-XII intact.  No gross sensory or motor deficits            Data Review:    Review and/or order of clinical lab test  Review and/or order of tests in the radiology section of CPT  Review and/or order of tests in the medicine section of CPT      Labs:     Recent Labs     09/01/21 0407 08/31/21  0549   WBC 15.5* 14.8*   HGB 9.1* 8.7*   HCT 26.2* 25.4*   * 447*     Recent Labs     09/01/21  0407 08/31/21  0549 08/30/21  0420    137 143   K 4.5 3.9 4.0    105 113*   CO2 25 24 25   BUN 24* 31* 44* CREA 0.80 1.00 1.00   GLU 72 70 98   CA 8.0* 8.0* 8.2*   MG 1.6* 1.6* 1.8     Recent Labs     09/01/21  0407 08/31/21  0549 08/30/21  0420   ALT 30 35 39   AP 72 73 69   TBILI 0.4 0.4 0.8   TP 5.2* 5.1* 5.0*   ALB 2.2* 2.2* 2.1*   GLOB 3.0 2.9 2.9     No results for input(s): INR, PTP, APTT, INREXT in the last 72 hours. No results for input(s): FE, TIBC, PSAT, FERR in the last 72 hours. No results found for: FOL, RBCF   No results for input(s): PH, PCO2, PO2 in the last 72 hours. No results for input(s): CPK, CKNDX, TROIQ in the last 72 hours.     No lab exists for component: CPKMB  No results found for: CHOL, CHOLX, CHLST, CHOLV, HDL, HDLP, LDL, LDLC, DLDLP, TGLX, TRIGL, TRIGP, CHHD, CHHDX  Lab Results   Component Value Date/Time    Glucose (POC) 200 (H) 09/01/2021 09:52 AM    Glucose (POC) 81 09/01/2021 07:04 AM    Glucose (POC) 208 (H) 08/31/2021 08:02 PM    Glucose (POC) 174 (H) 08/31/2021 03:37 PM    Glucose (POC) 186 (H) 08/31/2021 11:53 AM     Lab Results   Component Value Date/Time    Color Yellow 08/30/2021 08:53 PM    Appearance Clear 08/30/2021 08:53 PM    Specific gravity 1.013 08/30/2021 08:53 PM    pH (UA) 5.0 08/30/2021 08:53 PM    Protein 100 (A) 08/30/2021 08:53 PM    Glucose Negative 08/30/2021 08:53 PM    Ketone Negative 08/30/2021 08:53 PM    Bilirubin Negative 08/30/2021 08:53 PM    Urobilinogen 0.2 08/30/2021 08:53 PM    Nitrites Negative 08/30/2021 08:53 PM    Leukocyte Esterase Negative 08/30/2021 08:53 PM    Epithelial cells Few 08/30/2021 08:53 PM    Bacteria 1+ (A) 08/30/2021 08:53 PM    WBC 0-4 08/30/2021 08:53 PM    RBC 20-50 08/30/2021 08:53 PM         Medications Reviewed:     Current Facility-Administered Medications   Medication Dose Route Frequency    0.45% sodium chloride infusion  75 mL/hr IntraVENous CONTINUOUS    clindamycin (CLEOCIN) capsule 300 mg  300 mg Oral Q8H    sodium chloride (NS) flush 5-10 mL  5-10 mL IntraVENous PRN    sodium chloride (NS) flush 5-40 mL 5-40 mL IntraVENous Q8H    sodium chloride (NS) flush 5-40 mL  5-40 mL IntraVENous PRN    acetaminophen (TYLENOL) tablet 650 mg  650 mg Oral Q6H PRN    Or    acetaminophen (TYLENOL) suppository 650 mg  650 mg Rectal Q6H PRN    polyethylene glycol (MIRALAX) packet 17 g  17 g Oral DAILY PRN    ondansetron (ZOFRAN ODT) tablet 4 mg  4 mg Oral Q8H PRN    Or    ondansetron (ZOFRAN) injection 4 mg  4 mg IntraVENous Q6H PRN    apixaban (ELIQUIS) tablet 2.5 mg  2.5 mg Oral Q12H    dextrose (D50W) injection syrg 12.5-25 g  25-50 mL IntraVENous PRN    insulin lispro (HUMALOG) injection   SubCUTAneous AC&HS    glucose chewable tablet 16 g  4 Tablet Oral PRN    glucagon (GLUCAGEN) injection 1 mg  1 mg IntraMUSCular PRN    dextrose (D50W) injection syrg 12.5-25 g  25-50 mL IntraVENous PRN     ______________________________________________________________________  EXPECTED LENGTH OF STAY: 4d 19h  ACTUAL LENGTH OF STAY:          5                 Hugh Malloy MD

## 2021-09-01 NOTE — PROGRESS NOTES
Assume care of pt. Pt resting with eyes closed. Call bell in reached, bed alarm set with bed in lowest position.

## 2021-09-01 NOTE — PROGRESS NOTES
[facility]        Progress Note    Name: Luis Alberto Le   : 1940   MRN: [id number]   Date: 2021    [x]I have reviewed the flowsheet and previous days notes. Events, vitals, medications and notes from last 24 hours reviewed. ASSESSMENT:   -Septic shock  -Acute kidney injury  -Urinary tract infection  -Bradycardia  -Hypothermia  -History of hypertension   PLAN:   -He is currently off all pressors and blood pressure is holding in 606 systolic range  -Appears more alert and awake but still quite confused  - Continue cefepime and clinda one more day  - WBCs have improved  -Renal function is improving  -Hold all antihypertensives  --Mild bump in troponin likely related to sepsis and underlying renal failure  -Continue current management as outlined by IM team  -Femoral line discontinued  -Continue Eliquis and peptic ulcer disease prophylaxis    -discussed with the nurse and RT.                  Subjective: More alert and awake    Underlying dementia    ROS: Unable to obtain due to patients current disposition      Allergy:  Allergies   Allergen Reactions    Oxycontin [Oxycodone] Other (comments)     hallucinations    Vicodin [Hydrocodone-Acetaminophen] Other (comments)        Vital Signs:    Visit Vitals  BP (!) 146/48 (BP 1 Location: Right upper arm, BP Patient Position: At rest)   Pulse 62   Temp 97.4 °F (36.3 °C)   Resp 17   Ht 5' 6\" (1.676 m)   Wt 63.8 kg (140 lb 10.5 oz)   SpO2 100%   BMI 22.70 kg/m²       O2 Device: None (Room air)       Temp (24hrs), Av.1 °F (36.2 °C), Min:96.7 °F (35.9 °C), Max:98 °F (36.7 °C)       Patient Vitals for the past 8 hrs:   Temp Pulse Resp BP SpO2   21 1006 97.4 °F (36.3 °C) 62 17 (!) 146/48 100 %   21 0701 96.8 °F (36 °C) (!) 59 17 (!) 170/59 100 %       Intake/Output:   Last shift:      701 -  1900  In: -   Out: 1100 [Urine:1100]  Last 3 shifts: 1901 -  0700  In: 3654.2 [P.O.:1200; I.V.:2454.2]  Out: 5150 [Urine:5150]    Intake/Output Summary (Last 24 hours) at 9/1/2021 1309  Last data filed at 9/1/2021 0932  Gross per 24 hour   Intake 600 ml   Output 4250 ml   Net -3650 ml         Physical Exam:  General: alert awake no acute distress. oriented to self  HEENT: EOMI, no Icterus, no Pallor,  mucosa dry. Neck: Neck is supple, No JVD  Lungs: breathsounds normal, no respiratory distress on inspection, no rhonchi, no rales,   CVS: heart sounds normal, regular rate and rhythm, 1/6 sm lsb   GI: soft, nontender, normal BS. Extremeties: no cyanosis, + trace edema, bilateral wounds with surrounding redness, warm to touch wounds are superficial,   Neuro: Alert, awake, oriented x1, No new focal deficits, moving all extremeties well.        DATA:   Current Facility-Administered Medications   Medication Dose Route Frequency    0.45% sodium chloride infusion  75 mL/hr IntraVENous CONTINUOUS    clindamycin (CLEOCIN) capsule 300 mg  300 mg Oral Q8H    sodium chloride (NS) flush 5-10 mL  5-10 mL IntraVENous PRN    sodium chloride (NS) flush 5-40 mL  5-40 mL IntraVENous Q8H    sodium chloride (NS) flush 5-40 mL  5-40 mL IntraVENous PRN    acetaminophen (TYLENOL) tablet 650 mg  650 mg Oral Q6H PRN    Or    acetaminophen (TYLENOL) suppository 650 mg  650 mg Rectal Q6H PRN    polyethylene glycol (MIRALAX) packet 17 g  17 g Oral DAILY PRN    ondansetron (ZOFRAN ODT) tablet 4 mg  4 mg Oral Q8H PRN    Or    ondansetron (ZOFRAN) injection 4 mg  4 mg IntraVENous Q6H PRN    apixaban (ELIQUIS) tablet 2.5 mg  2.5 mg Oral Q12H    dextrose (D50W) injection syrg 12.5-25 g  25-50 mL IntraVENous PRN    insulin lispro (HUMALOG) injection   SubCUTAneous AC&HS    glucose chewable tablet 16 g  4 Tablet Oral PRN    glucagon (GLUCAGEN) injection 1 mg  1 mg IntraMUSCular PRN    dextrose (D50W) injection syrg 12.5-25 g  25-50 mL IntraVENous PRN           Labs: Results:   Chemistry Recent Labs     09/01/21  0407 08/31/21  0549 08/30/21  0420   GLU 72 70 98    137 143   K 4.5 3.9 4.0    105 113*   CO2 25 24 25   BUN 24* 31* 44*   CREA 0.80 1.00 1.00   CA 8.0* 8.0* 8.2*   AGAP 5 8 5   BUCR 30 31 44   AP 72 73 69   TP 5.2* 5.1* 5.0*   ALB 2.2* 2.2* 2.1*   GLOB 3.0 2.9 2.9   AGRAT 0.7 0.8 0.7      CBC w/Diff Recent Labs     09/01/21  0407 08/31/21  0549 08/30/21  2211 08/30/21  0420 08/30/21 0420   WBC 15.5* 14.8*  --   --  12.1   RBC 3.03* 2.93*  --   --  2.71*   HGB 9.1* 8.7* 8.6*   < > 8.2*   HCT 26.2* 25.4* 24.5*   < > 23.3*   * 447*  --   --  450*   GRANS 74* 68  --   --  69   LYMPH 14* 19*  --   --  18*   EOS 2 3  --   --  3    < > = values in this interval not displayed. All Micro Results     Procedure Component Value Units Date/Time    CULTURE, BLOOD [[id number]] Collected: 08/27/21 0540    Order Status: Completed Specimen: Blood Updated: 09/01/21 0813     Special Requests: No Special Requests        Culture result: No growth 5 days       CULTURE, BLOOD [[id number]] Collected: 08/27/21 0420    Order Status: Completed Specimen: Blood Updated: 09/01/21 0813     Special Requests: No Special Requests        Culture result: No growth 5 days       CULTURE, URINE [[id number]] Collected: 08/27/21 0145    Order Status: Completed Specimen: Urine Updated: 08/28/21 2118     Special Requests: No Special Requests        Culture result: No Growth (<1000 cfu/mL)       CULTURE, BLOOD [[id number]] Collected: 08/27/21 0545    Order Status: Canceled Specimen: Blood     CULTURE, BLOOD [[id number]]     Order Status: Canceled Specimen: Blood     COVID-19 RAPID TEST [[id number]] Collected: 08/27/21 0145    Order Status: Completed Specimen: Nasopharyngeal Updated: 08/27/21 0245     Specimen source Nasopharynx        COVID-19 rapid test Not Detected        Comment:   Rapid NAAT:  The specimen is NEGATIVE for SARS-CoV-2, the novel coronavirus associated with COVID-19.    Negative results should be treated as presumptive and, if inconsistent with clinical signs and symptoms or necessary for patient management, should be tested with an alternative molecular assay. Negative results do not preclude SARS-CoV-2 infection and should not be used as the sole basis for patient management decisions. This test has been authorized by the FDA under an Emergency Use   Authorization (EUA) for use by authorized laboratories. Fact sheet for Healthcare Providers: ConventionUpdate.co.nz Fact sheet for Patients: ConventionUpdate.co.nz   Methodology: Isothermal Nucleic Acid Amplification                 Imaging:  No results found.           Shanae Lisa MD  September 1, 2021  11:30 AM

## 2021-09-01 NOTE — PROGRESS NOTES
Follow-up to [facility]. Pt has been accepted by [facility]. Wife needs to get help to get the furniture moved around in the mobile home, planning on discharge either joy or Friday. CM following.

## 2021-09-01 NOTE — PROGRESS NOTES
OT DAILY TREATMENT NOTE     Patient Name: Bipin Lau  Date:2021  : 1940  [x]  Patient  Verified  Payor: Homar Grady / Plan: [place] & [place] / Product Type: Medicare /    In time:330 Out time:359  Total Treatment Time (min):29      Treatment Area: Hypothermia [T68. XXXA]  Bradycardia [R00.1]  UTI (urinary tract infection) [N39.0]  Fall [W19. XXXA]    SUBJECTIVE  Pre- tx Pain Level (0-10 scale): 0/10  Post- tx pain level (0/10 scale)0/10  Any medication changes, allergies to medications, adverse drug reactions, diagnosis change, or new procedure performed?: [x] No    [] Yes (see summary sheet for update)  Subjective functional status/changes:   [] No changes reported  Pt making good progress with mobility with encouragement to maximize I with basic ADLs           29 min Therapeutic Activity:  []  See flow sheet :   Rationale: increase ROM, increase strength and improve coordination  to improve the patients ability to complete basic transfers and mobility tasks. Pt required max A with max A            With   [] TE   [x] TA   [] neuro   [] other: Patient Education: [x] Review HEP    [x] Progressed/Changed HEP based on:   [x] positioning   [x] body mechanics   [] transfers   [] heat/ice application   [] Splint wear/care   [] Sensory re-education   [] scar management      [] other:        ASSESSMENT  Progress towards goals/ change in function: Pt making some improvements with mobility and basic ADLs. Cont to maximize function   Patient will continue to benefit from skilled OT services to address functional mobility deficits, address ROM deficits and address strength deficits to attain remaining goals.      [x]  See Plan of Care  []  See progress note/recertification  []  See Discharge Summary         PLAN  [x]  Upgrade activities as tolerated       []  Continue plan of care        []  Discharge due to:_  []  Other:_      Nhi Gordon MS, OTR/L  2021  4:40 PM    No future appointments.

## 2021-09-02 LAB
BACTERIA SPEC CULT: NORMAL
BACTERIA SPEC CULT: NORMAL
GLUCOSE BLD STRIP.AUTO-MCNC: 123 MG/DL (ref 70–110)
GLUCOSE BLD STRIP.AUTO-MCNC: 174 MG/DL (ref 70–110)
GLUCOSE BLD STRIP.AUTO-MCNC: 296 MG/DL (ref 70–110)
GLUCOSE BLD STRIP.AUTO-MCNC: 77 MG/DL (ref 70–110)
MAGNESIUM SERPL-MCNC: 1.6 MG/DL (ref 1.7–2.8)
PERFORMED BY, TECHID: ABNORMAL
PERFORMED BY, TECHID: NORMAL
SPECIAL REQUESTS,SREQ: NORMAL
SPECIAL REQUESTS,SREQ: NORMAL

## 2021-09-02 PROCEDURE — 74011250637 HC RX REV CODE- 250/637: Performed by: INTERNAL MEDICINE

## 2021-09-02 PROCEDURE — 97530 THERAPEUTIC ACTIVITIES: CPT

## 2021-09-02 PROCEDURE — 83735 ASSAY OF MAGNESIUM: CPT

## 2021-09-02 PROCEDURE — 65270000029 HC RM PRIVATE

## 2021-09-02 PROCEDURE — 74011636637 HC RX REV CODE- 636/637: Performed by: INTERNAL MEDICINE

## 2021-09-02 PROCEDURE — 82962 GLUCOSE BLOOD TEST: CPT

## 2021-09-02 PROCEDURE — 74011250637 HC RX REV CODE- 250/637: Performed by: NURSE PRACTITIONER

## 2021-09-02 PROCEDURE — 36415 COLL VENOUS BLD VENIPUNCTURE: CPT

## 2021-09-02 RX ORDER — CLINDAMYCIN HYDROCHLORIDE 150 MG/1
300 CAPSULE ORAL EVERY 8 HOURS
Status: DISCONTINUED | OUTPATIENT
Start: 2021-09-02 | End: 2021-09-04 | Stop reason: HOSPADM

## 2021-09-02 RX ORDER — CLINDAMYCIN HYDROCHLORIDE 150 MG/1
300 CAPSULE ORAL EVERY 6 HOURS
Status: DISCONTINUED | OUTPATIENT
Start: 2021-09-02 | End: 2021-09-02

## 2021-09-02 RX ORDER — LANOLIN ALCOHOL/MO/W.PET/CERES
400 CREAM (GRAM) TOPICAL 2 TIMES DAILY
Status: DISCONTINUED | OUTPATIENT
Start: 2021-09-02 | End: 2021-09-04 | Stop reason: HOSPADM

## 2021-09-02 RX ADMIN — INSULIN LISPRO 6 UNITS: 100 INJECTION, SOLUTION INTRAVENOUS; SUBCUTANEOUS at 22:27

## 2021-09-02 RX ADMIN — Medication 10 ML: at 13:51

## 2021-09-02 RX ADMIN — Medication 10 ML: at 22:27

## 2021-09-02 RX ADMIN — APIXABAN 2.5 MG: 2.5 TABLET, FILM COATED ORAL at 22:27

## 2021-09-02 RX ADMIN — ACETAMINOPHEN 650 MG: 325 TABLET ORAL at 15:45

## 2021-09-02 RX ADMIN — INSULIN LISPRO 2 UNITS: 100 INJECTION, SOLUTION INTRAVENOUS; SUBCUTANEOUS at 11:39

## 2021-09-02 RX ADMIN — CLINDAMYCIN HYDROCHLORIDE 300 MG: 150 CAPSULE ORAL at 05:36

## 2021-09-02 RX ADMIN — CLINDAMYCIN HYDROCHLORIDE 300 MG: 150 CAPSULE ORAL at 22:26

## 2021-09-02 RX ADMIN — MAGNESIUM OXIDE 400 MG (241.3 MG MAGNESIUM) TABLET 400 MG: TABLET at 17:07

## 2021-09-02 RX ADMIN — MAGNESIUM OXIDE 400 MG (241.3 MG MAGNESIUM) TABLET 400 MG: TABLET at 13:50

## 2021-09-02 RX ADMIN — APIXABAN 2.5 MG: 2.5 TABLET, FILM COATED ORAL at 08:55

## 2021-09-02 RX ADMIN — CLINDAMYCIN HYDROCHLORIDE 300 MG: 150 CAPSULE ORAL at 11:39

## 2021-09-02 NOTE — PROGRESS NOTES
Pt resting in bed watching tv hob elevated, no acute distress or sob noted, denies pain or discomfort, pt reposition in bed, denies any needs at this time, bed in lowest position, call bell in reach.

## 2021-09-02 NOTE — PROGRESS NOTES
[facility]        Progress Note    Name: Remedios Escalante   : 1940   MRN: [id number]   Date: 2021    [x]I have reviewed the flowsheet and previous days notes. Events, vitals, medications and notes from last 24 hours reviewed. ASSESSMENT:   -Septic shock  -Acute kidney injury  -Urinary tract infection  -Bradycardia  -Hypothermia  -History of hypertension   PLAN:   -He is currently off all pressors and blood pressure is holding in 294 systolic range  -Appears more alert and awake but still quite confused  - Continue cefepime and clinda one more day  - WBCs have improved  -Renal function is improving  -Hold all antihypertensives  --Mild bump in troponin likely related to sepsis and underlying renal failure  -Continue current management as outlined by IM team  -Femoral line discontinued  -Continue Eliquis and peptic ulcer disease prophylaxis    -discussed with the nurse and RT. Subjective: More alert and awake    Underlying dementia    ROS: Unable to obtain due to patients current disposition      Allergy:  Allergies   Allergen Reactions    Oxycontin [Oxycodone] Other (comments)     hallucinations    Vicodin [Hydrocodone-Acetaminophen] Other (comments)        Vital Signs:    Visit Vitals  BP (!) 145/58   Pulse 62   Temp 98.8 °F (37.1 °C)   Resp 19   Ht 5' 6\" (1.676 m)   Wt 63.6 kg (140 lb 4.8 oz)   SpO2 97%   BMI 22.65 kg/m²       O2 Device: None (Room air)       Temp (24hrs), Av.2 °F (36.8 °C), Min:97.7 °F (36.5 °C), Max:98.8 °F (37.1 °C)       Patient Vitals for the past 8 hrs:   Temp Pulse Resp BP SpO2   21 0806 98.8 °F (37.1 °C) 62 19 (!) 145/58 97 %   21 0348  60      21 0340 98 °F (36.7 °C) 62 20 (!) 158/96 97 %       Intake/Output:   Last shift:      No intake/output data recorded.   Last 3 shifts:  1901 -  0700  In: 3393.8 [I.V.:3393.8]  Out: 4700 [Urine:4700]    Intake/Output Summary (Last 24 hours) at 9/2/2021 1058  Last data filed at 9/2/2021 0426  Gross per 24 hour   Intake 3393.75 ml   Output 1950 ml   Net 1443.75 ml         Physical Exam:  General: alert awake no acute distress. oriented to self  HEENT: EOMI, no Icterus, no Pallor,  mucosa dry. Neck: Neck is supple, No JVD  Lungs: breathsounds normal, no respiratory distress on inspection, no rhonchi, no rales,   CVS: heart sounds normal, regular rate and rhythm, 1/6 sm lsb   GI: soft, nontender, normal BS. Extremeties: no cyanosis, + trace edema, bilateral wounds with surrounding redness, warm to touch wounds are superficial,   Neuro: Alert, awake, oriented x1, No new focal deficits, moving all extremeties well.        DATA:   Current Facility-Administered Medications   Medication Dose Route Frequency    0.45% sodium chloride infusion  75 mL/hr IntraVENous CONTINUOUS    sodium chloride (NS) flush 5-10 mL  5-10 mL IntraVENous PRN    sodium chloride (NS) flush 5-40 mL  5-40 mL IntraVENous Q8H    sodium chloride (NS) flush 5-40 mL  5-40 mL IntraVENous PRN    acetaminophen (TYLENOL) tablet 650 mg  650 mg Oral Q6H PRN    Or    acetaminophen (TYLENOL) suppository 650 mg  650 mg Rectal Q6H PRN    polyethylene glycol (MIRALAX) packet 17 g  17 g Oral DAILY PRN    ondansetron (ZOFRAN ODT) tablet 4 mg  4 mg Oral Q8H PRN    Or    ondansetron (ZOFRAN) injection 4 mg  4 mg IntraVENous Q6H PRN    apixaban (ELIQUIS) tablet 2.5 mg  2.5 mg Oral Q12H    dextrose (D50W) injection syrg 12.5-25 g  25-50 mL IntraVENous PRN    insulin lispro (HUMALOG) injection   SubCUTAneous AC&HS    glucose chewable tablet 16 g  4 Tablet Oral PRN    glucagon (GLUCAGEN) injection 1 mg  1 mg IntraMUSCular PRN    dextrose (D50W) injection syrg 12.5-25 g  25-50 mL IntraVENous PRN           Labs: Results:   Chemistry Recent Labs     09/01/21  0407 08/31/21  0549   GLU 72 70    137   K 4.5 3.9    105   CO2 25 24   BUN 24* 31*   CREA 0.80 1.00   CA 8.0* 8.0*   AGAP 5 8   BUCR 30 31   AP 72 73   TP 5.2* 5.1*   ALB 2.2* 2.2*   GLOB 3.0 2.9   AGRAT 0.7 0.8      CBC w/Diff Recent Labs     09/01/21  0407 08/31/21  0549 08/30/21  2211   WBC 15.5* 14.8*  --    RBC 3.03* 2.93*  --    HGB 9.1* 8.7* 8.6*   HCT 26.2* 25.4* 24.5*   * 447*  --    GRANS 74* 68  --    LYMPH 14* 19*  --    EOS 2 3  --           All Micro Results     Procedure Component Value Units Date/Time    CULTURE, BLOOD [[id number]] Collected: 08/27/21 0540    Order Status: Completed Specimen: Blood Updated: 09/02/21 0804     Special Requests: No Special Requests        Culture result: No growth 6 days       CULTURE, BLOOD [[id number]] Collected: 08/27/21 0420    Order Status: Completed Specimen: Blood Updated: 09/02/21 0804     Special Requests: No Special Requests        Culture result: No growth 6 days       CULTURE, URINE [[id number]] Collected: 08/27/21 0145    Order Status: Completed Specimen: Urine Updated: 08/28/21 2118     Special Requests: No Special Requests        Culture result: No Growth (<1000 cfu/mL)       CULTURE, BLOOD [[id number]] Collected: 08/27/21 0545    Order Status: Canceled Specimen: Blood     CULTURE, BLOOD [[id number]]     Order Status: Canceled Specimen: Blood     COVID-19 RAPID TEST [[id number]] Collected: 08/27/21 0145    Order Status: Completed Specimen: Nasopharyngeal Updated: 08/27/21 0245     Specimen source Nasopharynx        COVID-19 rapid test Not Detected        Comment:   Rapid NAAT:  The specimen is NEGATIVE for SARS-CoV-2, the novel coronavirus associated with COVID-19. Negative results should be treated as presumptive and, if inconsistent with clinical signs and symptoms or necessary for patient management, should be tested with an alternative molecular assay. Negative results do not preclude SARS-CoV-2 infection and should not be used as the sole basis for patient management decisions.    This test has been authorized by the FDA under an Emergency Use   Authorization (EUA) for use by authorized laboratories. Fact sheet for Healthcare Providers: iTendency.uy Fact sheet for Patients: iTendency.uy   Methodology: Isothermal Nucleic Acid Amplification                 Imaging:  No results found.           Stefania Mendoza MD  September 2, 2021  11:30 AM

## 2021-09-02 NOTE — PROGRESS NOTES
Rounded on patient. VSS. Patient in bed watching tv. Assessment completed. No needs noted at this time. Will continue to monitor.  Bed in lowest position and call bell in reach

## 2021-09-02 NOTE — PROGRESS NOTES
Medications administered to patient. Patient tolerated it well. Snack provided. No other needs noted.  Will continue to monitor

## 2021-09-02 NOTE — PROGRESS NOTES
OT DAILY TREATMENT NOTE     Patient Name: Roula Álvarez  Date:2021  : 1940  [x]  Patient  Verified  Payor: Juan Daniel Calzada / Plan: [place] MEDICARE PART A & B / Product Type: Medicare /    In time:300 Out time:325  Total Treatment Time (min): 25    Treatment Area: Hypothermia [T68. XXXA]  Bradycardia [R00.1]  UTI (urinary tract infection) [N39.0]  Fall [W19. XXXA]    SUBJECTIVE  Pre- tx Pain Level (0-10 scale): 0/10  Post- tx pain level (0/10 scale)0/10  Any medication changes, allergies to medications, adverse drug reactions, diagnosis change, or new procedure performed?: [x] No    [] Yes (see summary sheet for update)  Subjective functional status/changes:   [] No changes reported  Pt seen in bed requesting to remain in bed             25 min Therapeutic Activity:  []  See flow sheet :   Rationale: increase ROM, increase strength and improve coordination  to improve the patients ability to complete reaching and B hand coordination tasks for reaching inside [place]. Pt slow to initiate reaction times          With   [] TE   [x] TA   [] neuro   [] other: Patient Education: [x] Review HEP    [x] Progressed/Changed HEP based on:   [x] positioning   [] body mechanics   [] transfers   [] heat/ice application   [] Splint wear/care   [] Sensory re-education   [] scar management      [] other:        ASSESSMENT  Progress towards goals/ change in function: Pt making good progress with B hand function, but requires encouragement to maximize function   Patient will continue to benefit from skilled OT services to address functional mobility deficits, address ROM deficits and address strength deficits to attain remaining goals.      [x]  See Plan of Care  []  See progress note/recertification  []  See Discharge Summary         PLAN  [x]  Upgrade activities as tolerated       []  Continue plan of care        []  Discharge due to:_  []  Other:_      Diana Argueta MS, OTR/L  2021  4:52 PM    No future appointments.

## 2021-09-02 NOTE — PROGRESS NOTES
IVF d/c'd at this time. Spouse remains at bedside. Pt continues to elevate RUE, swelling is decreased but not resolved.

## 2021-09-02 NOTE — PROGRESS NOTES
Pt states that right hand pain is 2/10. Dressings changes to the LUE and the LLE. Note that the LUE is skin tear and pt states that the LLE were blisters that opened; these are the circumference of the ankle.

## 2021-09-02 NOTE — PROGRESS NOTES
Called into room by pts spouse; spouse has asked why the white board says home on it and that the pt cannot go home ; cannot go home until after  Friday. Explained to the spouse that the \"discharge plan\" states home for any pt that lived at home prior to admission. Spouse became hostile at the response and stated to have  for legal action. Explained to the pts spouse that I would contact unit manager to make her aware of statement of legal action as well as any other concerns. Spouse states that there has been no provider in contact w/ her or her  since the pts admission; explained to the spouse that the MD did see the pt this morning and has completed assessment of the pt and did note hand swelling. Hospitalist made aware.

## 2021-09-02 NOTE — PROGRESS NOTES
Pt awake and alert. Breath sounds clear. Bowel sounds positive. PP positive. Pt noted to have dressing in place to the left upper arm. Pt has purple areas to BUE and BLE. Dressing intact to the sacrum and LLE. Pt has swelling noted to the right hand; ROM is affected and states pain. Pt has 20g to the left wrist infusing 1/2 NS at 75ml/hr. Miles catheter in place draining via gravity; urine is dawit in color. Blood glucose 123, no SSI needed.

## 2021-09-02 NOTE — PROGRESS NOTES
Hospitalist Progress Note             Date of Service:  2021  NAME:  Roula Álvarez  :  1940  MRN:  [id number]    Assessment/Plan:      Septic shock due to urinary tract infection   Acute sepsis has resolved.  sp cefepime  Discontinued Levaquin given prolonged QT interval.    Cellulitis of lower ext has I improved. Continue clinda     VLADIMIR (acute kidney injury)   Likely 2/2 volume depletion  Renal function is continuing to improve.        Hypothermia  Resolved     Hypertension  stable     Fall  Recurrent  PT/OT consult  Fall precautions  CT head negative for acute intracranial abnormalities     History of DVT (deep vein thrombosis)  Continue Eliquis     DM (diabetes mellitus) ([facility])  Accuchecks, SSI, A1C     Hypernatremia  Improved     Elevated cardiac enzymes  There is a slight increase in troponins but I suspect this was due to renal failure.  He has no chest pain.  He does have a prolonged QT.  He has no signs of fluid overload at this time     Episode of Bradycardia  Stable     Bilateral lower extremity wounds present on admission note these or not decubitus wounds  Continue clindamycin day 4.     Completed 4 days of cefepime.     Generalized debility      Met with wife, did best I could to make sure she understood hospice concept. She said they would probably be able to safely accept him home Saturday morning. Hospital Problems  Date Reviewed: 2017        Codes Class Noted POA    Bradycardia ICD-10-CM: R00.1  ICD-9-CM: 427.89  2021 Unknown        Hypothermia ICD-10-CM: T68. Rebecca Onofre  ICD-9-CM: 991.6  2021 Unknown        Fall ICD-10-CM: W19. Rebecca Onofre  ICD-9-CM: E888.9  2021 Unknown        Septic shock due to urinary tract infection ([facility] [address] ICD-10-CM: A41.9, R65.21, N39.0  ICD-9-CM: 599.0, 995.92, 785.52  2021 Unknown        DM (diabetes mellitus) ([facility] [address] ICD-10-CM: E11.9  ICD-9-CM: 250.00 Unknown Unknown        History of DVT (deep vein thrombosis) ICD-10-CM: B12.559  ICD-9-CM: V12.51  Unknown Unknown        VLADIMIR (acute kidney injury) ([facility] [address] ICD-10-CM: N17.9  ICD-9-CM: 015. 9  Unknown Unknown        Hypertension ICD-10-CM: I10  ICD-9-CM: 401.9  8/10/2021 Yes                Review of Systems:   A comprehensive review of systems was negative except for that written in the HPI. Vital Signs:    Last 24hrs VS reviewed since prior progress note. Most recent are:  Visit Vitals  BP (!) 145/58   Pulse 62   Temp 98.8 °F (37.1 °C)   Resp 19   Ht 5' 6\" (1.676 m)   Wt 63.6 kg (140 lb 4.8 oz)   SpO2 97%   BMI 22.65 kg/m²         Intake/Output Summary (Last 24 hours) at 9/2/2021 1058  Last data filed at 9/2/2021 0426  Gross per 24 hour   Intake 3393.75 ml   Output 1950 ml   Net 1443.75 ml        Physical Examination:             General:          Alert, cooperative, no distress, appears stated age. HEENT:           Atraumatic, anicteric sclerae, pink conjunctivae                          No oral ulcers, mucosa moist, throat clear, dentition fair  Neck:               Supple, symmetrical  Lungs:             Clear to auscultation bilaterally. No Wheezing or Rhonchi. No rales. Chest wall:      No tenderness  No Accessory muscle use. Heart:              Regular  rhythm,  No  murmur   left upper extremity dependent edema  Abdomen:        Soft, non-tender. Not distended. Bowel sounds normal  Extremities:     No cyanosis. No clubbing,                            Skin turgor normal, Capillary refill normal  Skin:                Not pale. Not Jaundiced  No rashes   Psych:             Not anxious or agitated.   Neurologic:      Alert, moves all extremities, answers questions appropriately and responds to commands        Data Review:    Review and/or order of clinical lab test  Review and/or order of tests in the radiology section of CPT  Review and/or order of tests in the medicine section of CPT      Labs: Recent Labs     09/01/21 0407 08/31/21  0549   WBC 15.5* 14.8*   HGB 9.1* 8.7*   HCT 26.2* 25.4*   * 447*     Recent Labs     09/02/21 0523 09/01/21 0407 08/31/21  0549   NA  --  137 137   K  --  4.5 3.9   CL  --  107 105   CO2  --  25 24   BUN  --  24* 31*   CREA  --  0.80 1.00   GLU  --  72 70   CA  --  8.0* 8.0*   MG 1.6* 1.6* 1.6*     Recent Labs     09/01/21 0407 08/31/21  0549   ALT 30 35   AP 72 73   TBILI 0.4 0.4   TP 5.2* 5.1*   ALB 2.2* 2.2*   GLOB 3.0 2.9     No results for input(s): INR, PTP, APTT, INREXT in the last 72 hours. No results for input(s): FE, TIBC, PSAT, FERR in the last 72 hours. No results found for: FOL, RBCF   No results for input(s): PH, PCO2, PO2 in the last 72 hours. No results for input(s): CPK, CKNDX, TROIQ in the last 72 hours.     No lab exists for component: CPKMB  No results found for: CHOL, CHOLX, CHLST, CHOLV, HDL, HDLP, LDL, LDLC, DLDLP, TGLX, TRIGL, TRIGP, CHHD, CHHDX  Lab Results   Component Value Date/Time    Glucose (POC) 123 (H) 09/02/2021 07:15 AM    Glucose (POC) 201 (H) 09/01/2021 07:19 PM    Glucose (POC) 147 (H) 09/01/2021 03:57 PM    Glucose (POC) 200 (H) 09/01/2021 09:52 AM    Glucose (POC) 81 09/01/2021 07:04 AM     Lab Results   Component Value Date/Time    Color Yellow 08/30/2021 08:53 PM    Appearance Clear 08/30/2021 08:53 PM    Specific gravity 1.013 08/30/2021 08:53 PM    pH (UA) 5.0 08/30/2021 08:53 PM    Protein 100 (A) 08/30/2021 08:53 PM    Glucose Negative 08/30/2021 08:53 PM    Ketone Negative 08/30/2021 08:53 PM    Bilirubin Negative 08/30/2021 08:53 PM    Urobilinogen 0.2 08/30/2021 08:53 PM    Nitrites Negative 08/30/2021 08:53 PM    Leukocyte Esterase Negative 08/30/2021 08:53 PM    Epithelial cells Few 08/30/2021 08:53 PM    Bacteria 1+ (A) 08/30/2021 08:53 PM    WBC 0-4 08/30/2021 08:53 PM    RBC 20-50 08/30/2021 08:53 PM         Medications Reviewed:     Current Facility-Administered Medications   Medication Dose Route Frequency    0.45% sodium chloride infusion  75 mL/hr IntraVENous CONTINUOUS    sodium chloride (NS) flush 5-10 mL  5-10 mL IntraVENous PRN    sodium chloride (NS) flush 5-40 mL  5-40 mL IntraVENous Q8H    sodium chloride (NS) flush 5-40 mL  5-40 mL IntraVENous PRN    acetaminophen (TYLENOL) tablet 650 mg  650 mg Oral Q6H PRN    Or    acetaminophen (TYLENOL) suppository 650 mg  650 mg Rectal Q6H PRN    polyethylene glycol (MIRALAX) packet 17 g  17 g Oral DAILY PRN    ondansetron (ZOFRAN ODT) tablet 4 mg  4 mg Oral Q8H PRN    Or    ondansetron (ZOFRAN) injection 4 mg  4 mg IntraVENous Q6H PRN    apixaban (ELIQUIS) tablet 2.5 mg  2.5 mg Oral Q12H    dextrose (D50W) injection syrg 12.5-25 g  25-50 mL IntraVENous PRN    insulin lispro (HUMALOG) injection   SubCUTAneous AC&HS    glucose chewable tablet 16 g  4 Tablet Oral PRN    glucagon (GLUCAGEN) injection 1 mg  1 mg IntraMUSCular PRN    dextrose (D50W) injection syrg 12.5-25 g  25-50 mL IntraVENous PRN     ______________________________________________________________________  EXPECTED LENGTH OF STAY: 4d 19h  ACTUAL LENGTH OF STAY:          6                 Jayant To MD

## 2021-09-02 NOTE — PROGRESS NOTES
Pts right upper extremity and Ice pack applied to the right hand.  Pt refuses X-ray suggested by MD.

## 2021-09-02 NOTE — PROGRESS NOTES
Pts spouse on the phone w/ someone from the outside stating that her and her  Scanlon Exon being kicked out of the hospital today and that she would give the caller 50.00 to come up here and that is all she had\". I did not make entry into the room but did let nursing supervisor know as we do not have plans to discharge this pt and this was previously explained.

## 2021-09-03 LAB
GLUCOSE BLD STRIP.AUTO-MCNC: 150 MG/DL (ref 70–110)
GLUCOSE BLD STRIP.AUTO-MCNC: 152 MG/DL (ref 70–110)
GLUCOSE BLD STRIP.AUTO-MCNC: 169 MG/DL (ref 70–110)
GLUCOSE BLD STRIP.AUTO-MCNC: 177 MG/DL (ref 70–110)
MAGNESIUM SERPL-MCNC: 1.8 MG/DL (ref 1.7–2.8)
PERFORMED BY, TECHID: ABNORMAL

## 2021-09-03 PROCEDURE — 83735 ASSAY OF MAGNESIUM: CPT

## 2021-09-03 PROCEDURE — 74011250637 HC RX REV CODE- 250/637: Performed by: INTERNAL MEDICINE

## 2021-09-03 PROCEDURE — 36415 COLL VENOUS BLD VENIPUNCTURE: CPT

## 2021-09-03 PROCEDURE — 65270000029 HC RM PRIVATE

## 2021-09-03 PROCEDURE — 74011250637 HC RX REV CODE- 250/637: Performed by: NURSE PRACTITIONER

## 2021-09-03 PROCEDURE — 82962 GLUCOSE BLOOD TEST: CPT

## 2021-09-03 PROCEDURE — 74011636637 HC RX REV CODE- 636/637: Performed by: INTERNAL MEDICINE

## 2021-09-03 RX ADMIN — MAGNESIUM OXIDE 400 MG (241.3 MG MAGNESIUM) TABLET 400 MG: TABLET at 18:28

## 2021-09-03 RX ADMIN — MAGNESIUM OXIDE 400 MG (241.3 MG MAGNESIUM) TABLET 400 MG: TABLET at 08:01

## 2021-09-03 RX ADMIN — CLINDAMYCIN HYDROCHLORIDE 300 MG: 150 CAPSULE ORAL at 05:21

## 2021-09-03 RX ADMIN — APIXABAN 2.5 MG: 2.5 TABLET, FILM COATED ORAL at 21:17

## 2021-09-03 RX ADMIN — CLINDAMYCIN HYDROCHLORIDE 300 MG: 150 CAPSULE ORAL at 21:17

## 2021-09-03 RX ADMIN — INSULIN LISPRO 2 UNITS: 100 INJECTION, SOLUTION INTRAVENOUS; SUBCUTANEOUS at 07:59

## 2021-09-03 RX ADMIN — CLINDAMYCIN HYDROCHLORIDE 300 MG: 150 CAPSULE ORAL at 13:54

## 2021-09-03 RX ADMIN — INSULIN LISPRO 2 UNITS: 100 INJECTION, SOLUTION INTRAVENOUS; SUBCUTANEOUS at 21:17

## 2021-09-03 RX ADMIN — APIXABAN 2.5 MG: 2.5 TABLET, FILM COATED ORAL at 08:01

## 2021-09-03 RX ADMIN — Medication 10 ML: at 05:21

## 2021-09-03 RX ADMIN — INSULIN LISPRO 2 UNITS: 100 INJECTION, SOLUTION INTRAVENOUS; SUBCUTANEOUS at 11:24

## 2021-09-03 RX ADMIN — Medication 10 ML: at 21:16

## 2021-09-03 NOTE — PROGRESS NOTES
Attempt to treat pt this morning, he refused stating he just wants to lay in bed and he does not want to exercise.    Sekou Adrienne, PT, DPT

## 2021-09-03 NOTE — PROGRESS NOTES
Pt received HS meds and orange juice, james draining at gravity, no acute distress or sob, denies any needs, call bell in reach.

## 2021-09-03 NOTE — PROGRESS NOTES
Received pt in bed w/ eyes open. Breath sounds clear. Bowel sounds positive; hyperactive. PP positive. Pt continues w/ right hand swelling; can move better this AM. Pt requesting Tylenol for 3/10 discomfort. Pt has 20g IV access to the left wrist that is patent. Dressings are CDI to the LUE and LLE. Miles in place draining via gravity; urine is yellow. Blood glucose is 150, will require SSI. Will continue to monitor for any status changes.

## 2021-09-03 NOTE — PROGRESS NOTES
1540- Care of the patient assumed at this time    1600- BG obtained, no needs per patient, watching TV    1642- only wants pudding off of dinner tray

## 2021-09-03 NOTE — PROGRESS NOTES
Hospitalist Progress Note             Date of Service:  9/3/2021  NAME:  Bonney Cranker  :  1940  MRN:  [id number]    Assessment/Plan:      Septic shock due to urinary tract infection   Acute sepsis has resolved.  sp cefepime  Discontinued Levaquin given prolonged QT interval.    Cellulitis of lower ext has I improved. Continue clinda     VLADIMIR (acute kidney injury)   Likely 2/2 volume depletion  Renal function is continuing to improve.        Hypothermia  Resolved     Hypertension  stable     Fall  Recurrent  PT/OT consult  Fall precautions  CT head negative for acute intracranial abnormalities     History of DVT (deep vein thrombosis)  Continue Eliquis     DM (diabetes mellitus) ([facility])  Accuchecks, SSI, A1C     Hypernatremia  Improved     Elevated cardiac enzymes  There is a slight increase in troponins but I suspect this was due to renal failure.  He has no chest pain.  He does have a prolonged QT.  He has no signs of fluid overload at this time     Episode of Bradycardia  Stable     Bilateral lower extremity wounds present on admission note these or not decubitus wounds  Continue clindamycin day 4.     Completed 4 days of cefepime.     Generalized debility        Spoke to pt alone, best efforts made to ensure he understoon what hospice meant. He seemed to get the overall jist of it. He understands that his body is failing generally, including his mind, and that he does not want to spend his remaining days in hospital or snf or ltc if it can be avoided. He is hoping hospice is able to allow him to stay at home comfortably and safely, and focus on quality of life not quantity. Hospital Problems  Date Reviewed: 2017        Codes Class Noted POA    Bradycardia ICD-10-CM: R00.1  ICD-9-CM: 427.89  2021 Unknown        Hypothermia ICD-10-CM: T68. Brandi Stamp  ICD-9-CM: 991.6  2021 Unknown        Fall ICD-10-CM: O00. Sacha Hazy  ICD-9-CM: E888.9  8/27/2021 Unknown        Septic shock due to urinary tract infection ([facility] ICD-10-CM: A41.9, R65.21, N39.0  ICD-9-CM: 599.0, 995.92, 785.52  8/27/2021 Unknown        DM (diabetes mellitus) ([facility]) ICD-10-CM: E11.9  ICD-9-CM: 250.00  Unknown Unknown        History of DVT (deep vein thrombosis) ICD-10-CM: S73.944  ICD-9-CM: V12.51  Unknown Unknown        VLADIMIR (acute kidney injury) ([facility] ICD-10-CM: N17.9  ICD-9-CM: 873. 9  Unknown Unknown        Hypertension ICD-10-CM: I10  ICD-9-CM: 401.9  8/10/2021 Yes                Review of Systems:   A comprehensive review of systems was negative except for that written in the HPI. Vital Signs:    Last 24hrs VS reviewed since prior progress note. Most recent are:  Visit Vitals  BP (!) 135/43 (BP 1 Location: Right upper arm, BP Patient Position: At rest)   Pulse (!) 58   Temp 97.1 °F (36.2 °C)   Resp 16   Ht 5' 6\" (1.676 m)   Wt 62.6 kg (138 lb)   SpO2 99%   BMI 22.27 kg/m²         Intake/Output Summary (Last 24 hours) at 9/3/2021 0926  Last data filed at 9/3/2021 0545  Gross per 24 hour   Intake 1040 ml   Output 2450 ml   Net -1410 ml        Physical Examination:                                                        General:          Alert, cooperative, no distress, appears stated age.     HEENT:           Atraumatic, anicteric sclerae, pink conjunctivae                          No oral ulcers, mucosa moist, throat clear, dentition fair  Neck:               Supple, symmetrical  Lungs:             Clear to auscultation bilaterally.  No Wheezing or Rhonchi. No rales. Chest wall:      No tenderness  No Accessory muscle use. Heart:              Regular  rhythm,  No  murmur   left upper extremity dependent edema  Abdomen:        Soft, non-tender.  Not distended.  Bowel sounds normal  Extremities:     No cyanosis.  No clubbing,                            Skin turgor normal, Capillary refill normal  Skin:                Not pale.  Not Jaundiced  No rashes   Psych:             Not anxious or agitated. Neurologic:      Alert, moves all extremities, answers questions appropriately and responds to commands        Data Review:    Review and/or order of clinical lab test  Review and/or order of tests in the radiology section of CPT  Review and/or order of tests in the medicine section of [facility]      Labs:     Recent Labs     09/01/21 0407   WBC 15.5*   HGB 9.1*   HCT 26.2*   *     Recent Labs     09/03/21  0430 09/02/21  0523 09/01/21  0407   NA  --   --  137   K  --   --  4.5   CL  --   --  107   CO2  --   --  25   BUN  --   --  24*   CREA  --   --  0.80   GLU  --   --  72   CA  --   --  8.0*   MG 1.8 1.6* 1.6*     Recent Labs     09/01/21 0407   ALT 30   AP 72   TBILI 0.4   TP 5.2*   ALB 2.2*   GLOB 3.0     No results for input(s): INR, PTP, APTT, INREXT in the last 72 hours. No results for input(s): FE, TIBC, PSAT, FERR in the last 72 hours. No results found for: FOL, RBCF   No results for input(s): PH, PCO2, PO2 in the last 72 hours. No results for input(s): CPK, CKNDX, TROIQ in the last 72 hours.     No lab exists for component: CPKMB  No results found for: CHOL, CHOLX, CHLST, CHOLV, HDL, HDLP, LDL, LDLC, DLDLP, TGLX, TRIGL, TRIGP, CHHD, CHHDX  Lab Results   Component Value Date/Time    Glucose (POC) 150 (H) 09/03/2021 07:03 AM    Glucose (POC) 296 (H) 09/02/2021 09:41 PM    Glucose (POC) 77 09/02/2021 03:42 PM    Glucose (POC) 174 (H) 09/02/2021 11:07 AM    Glucose (POC) 123 (H) 09/02/2021 07:15 AM     Lab Results   Component Value Date/Time    Color Yellow 08/30/2021 08:53 PM    Appearance Clear 08/30/2021 08:53 PM    Specific gravity 1.013 08/30/2021 08:53 PM    pH (UA) 5.0 08/30/2021 08:53 PM    Protein 100 (A) 08/30/2021 08:53 PM    Glucose Negative 08/30/2021 08:53 PM    Ketone Negative 08/30/2021 08:53 PM    Bilirubin Negative 08/30/2021 08:53 PM    Urobilinogen 0.2 08/30/2021 08:53 PM    Nitrites Negative 08/30/2021 08:53 PM    Leukocyte Esterase Negative 08/30/2021 08:53 PM    Epithelial cells Few 08/30/2021 08:53 PM    Bacteria 1+ (A) 08/30/2021 08:53 PM    WBC 0-4 08/30/2021 08:53 PM    RBC 20-50 08/30/2021 08:53 PM         Medications Reviewed:     Current Facility-Administered Medications   Medication Dose Route Frequency    magnesium oxide (MAG-OX) tablet 400 mg  400 mg Oral BID    clindamycin (CLEOCIN) capsule 300 mg  300 mg Oral Q8H    sodium chloride (NS) flush 5-10 mL  5-10 mL IntraVENous PRN    sodium chloride (NS) flush 5-40 mL  5-40 mL IntraVENous Q8H    sodium chloride (NS) flush 5-40 mL  5-40 mL IntraVENous PRN    acetaminophen (TYLENOL) tablet 650 mg  650 mg Oral Q6H PRN    Or    acetaminophen (TYLENOL) suppository 650 mg  650 mg Rectal Q6H PRN    polyethylene glycol (MIRALAX) packet 17 g  17 g Oral DAILY PRN    ondansetron (ZOFRAN ODT) tablet 4 mg  4 mg Oral Q8H PRN    Or    ondansetron (ZOFRAN) injection 4 mg  4 mg IntraVENous Q6H PRN    apixaban (ELIQUIS) tablet 2.5 mg  2.5 mg Oral Q12H    dextrose (D50W) injection syrg 12.5-25 g  25-50 mL IntraVENous PRN    insulin lispro (HUMALOG) injection   SubCUTAneous AC&HS    glucose chewable tablet 16 g  4 Tablet Oral PRN    glucagon (GLUCAGEN) injection 1 mg  1 mg IntraMUSCular PRN    dextrose (D50W) injection syrg 12.5-25 g  25-50 mL IntraVENous PRN     ______________________________________________________________________  EXPECTED LENGTH OF STAY: 4d 19h  ACTUAL LENGTH OF STAY:          7                 Josef Jones MD

## 2021-09-03 NOTE — PROGRESS NOTES
[facility]        Progress Note    Name: Avis Kearney   : 1940   MRN: [id number]   Date: 9/3/2021    [x]I have reviewed the flowsheet and previous days notes. Events, vitals, medications and notes from last 24 hours reviewed. Hospice plans noted   ASSESSMENT:   -Septic shock  -Acute kidney injury  -Urinary tract infection  -Bradycardia  -Hypothermia  -History of hypertension   PLAN:   -Remains hemodynamically stable  -Appears more alert and awake but still quite confused  - Continue clindamycin p.o., finished cefepime  - WBCs have improved  -Renal function is improving  -Hold all antihypertensives  --Mild bump in troponin likely related to sepsis and underlying renal failure  -Continue current management as outlined by IM team  -Femoral line discontinued  -Continue Eliquis and peptic ulcer disease prophylaxis    -discussed with the nurse and RT. Subjective: More alert and awake    Underlying dementia    ROS: Unable to obtain due to patients current disposition      Allergy:  Allergies   Allergen Reactions    Oxycontin [Oxycodone] Other (comments)     hallucinations    Vicodin [Hydrocodone-Acetaminophen] Other (comments)        Vital Signs:    Visit Vitals  BP (!) 135/43 (BP 1 Location: Right upper arm, BP Patient Position: At rest)   Pulse (!) 58   Temp 97.1 °F (36.2 °C)   Resp 16   Ht 5' 6\" (1.676 m)   Wt 62.6 kg (138 lb)   SpO2 99%   BMI 22.27 kg/m²       O2 Device: None (Room air)       Temp (24hrs), Av.1 °F (36.7 °C), Min:97.1 °F (36.2 °C), Max:99.5 °F (37.5 °C)       Patient Vitals for the past 8 hrs:   Temp Pulse Resp BP SpO2   21 0800  (!) 58      21 0703 97.1 °F (36.2 °C) (!) 58 16 (!) 135/43 99 %   21 0400 97.8 °F (36.6 °C) (!) 56 20 (!) 142/76 98 %       Intake/Output:   Last shift:      No intake/output data recorded. Last 3 shifts:  1901 -  0700  In: 4433.8 [P.O.:1040;  I.V.:3393.8]  Out: 9504 [[facility]:6402]    Intake/Output Summary (Last 24 hours) at 9/3/2021 1034  Last data filed at 9/3/2021 0545  Gross per 24 hour   Intake 1040 ml   Output 2450 ml   Net -1410 ml         Physical Exam:  General: alert awake no acute distress. oriented to self  HEENT: EOMI, no Icterus, no Pallor,  mucosa dry. Neck: Neck is supple, No JVD  Lungs: breathsounds normal, no respiratory distress on inspection, no rhonchi, no rales,   CVS: heart sounds normal, regular rate and rhythm, 1/6 sm lsb   GI: soft, nontender, normal BS. Extremeties: no cyanosis, + trace edema, bilateral wounds with surrounding redness, warm to touch wounds are superficial,   Neuro: Alert, awake, oriented x1, No new focal deficits, moving all extremeties well.        DATA:   Current Facility-Administered Medications   Medication Dose Route Frequency    magnesium oxide (MAG-OX) tablet 400 mg  400 mg Oral BID    clindamycin (CLEOCIN) capsule 300 mg  300 mg Oral Q8H    sodium chloride (NS) flush 5-10 mL  5-10 mL IntraVENous PRN    sodium chloride (NS) flush 5-40 mL  5-40 mL IntraVENous Q8H    sodium chloride (NS) flush 5-40 mL  5-40 mL IntraVENous PRN    acetaminophen (TYLENOL) tablet 650 mg  650 mg Oral Q6H PRN    Or    acetaminophen (TYLENOL) suppository 650 mg  650 mg Rectal Q6H PRN    polyethylene glycol (MIRALAX) packet 17 g  17 g Oral DAILY PRN    ondansetron (ZOFRAN ODT) tablet 4 mg  4 mg Oral Q8H PRN    Or    ondansetron (ZOFRAN) injection 4 mg  4 mg IntraVENous Q6H PRN    apixaban (ELIQUIS) tablet 2.5 mg  2.5 mg Oral Q12H    dextrose (D50W) injection syrg 12.5-25 g  25-50 mL IntraVENous PRN    insulin lispro (HUMALOG) injection   SubCUTAneous AC&HS    glucose chewable tablet 16 g  4 Tablet Oral PRN    glucagon (GLUCAGEN) injection 1 mg  1 mg IntraMUSCular PRN    dextrose (D50W) injection syrg 12.5-25 g  25-50 mL IntraVENous PRN           Labs: Results:   Chemistry Recent Labs     09/01/21  0407   GLU 72      K 4.5    CO2 25   BUN 24*   CREA 0.80   CA 8.0*   AGAP 5   BUCR 30   AP 72   TP 5.2*   ALB 2.2*   GLOB 3.0   AGRAT 0.7      CBC w/Diff Recent Labs     09/01/21  0407   WBC 15.5*   RBC 3.03*   HGB 9.1*   HCT 26.2*   *   GRANS 74*   LYMPH 14*   EOS 2          All Micro Results     Procedure Component Value Units Date/Time    CULTURE, BLOOD [[id number]] Collected: 08/27/21 0540    Order Status: Completed Specimen: Blood Updated: 09/02/21 0804     Special Requests: No Special Requests        Culture result: No growth 6 days       CULTURE, BLOOD [[id number]] Collected: 08/27/21 0420    Order Status: Completed Specimen: Blood Updated: 09/02/21 0804     Special Requests: No Special Requests        Culture result: No growth 6 days       CULTURE, URINE [[id number]] Collected: 08/27/21 0145    Order Status: Completed Specimen: Urine Updated: 08/28/21 2118     Special Requests: No Special Requests        Culture result: No Growth (<1000 cfu/mL)       CULTURE, BLOOD [[id number]] Collected: 08/27/21 0545    Order Status: Canceled Specimen: Blood     CULTURE, BLOOD [[id number]]     Order Status: Canceled Specimen: Blood     COVID-19 RAPID TEST [[id number]] Collected: 08/27/21 0145    Order Status: Completed Specimen: Nasopharyngeal Updated: 08/27/21 0245     Specimen source Nasopharynx        COVID-19 rapid test Not Detected        Comment:   Rapid NAAT:  The specimen is NEGATIVE for SARS-CoV-2, the novel coronavirus associated with COVID-19. Negative results should be treated as presumptive and, if inconsistent with clinical signs and symptoms or necessary for patient management, should be tested with an alternative molecular assay. Negative results do not preclude SARS-CoV-2 infection and should not be used as the sole basis for patient management decisions. This test has been authorized by the FDA under an Emergency Use   Authorization (EUA) for use by authorized laboratories.  Fact sheet for Healthcare Providers: ConventionUpdate.co.nz Fact sheet for Patients: ConventionUpdate.co.nz   Methodology: Isothermal Nucleic Acid Amplification                 Imaging:  No results found.           Birdie Alcantar MD  September 3, 2021  11:30 AM

## 2021-09-03 NOTE — PROGRESS NOTES
Pt resting in bed, no acute distress or sob noted, did request a sandwhich and ginger ale tolerated well, james emptied, no other needs voiced, call bell in reach.

## 2021-09-03 NOTE — PROGRESS NOTES
Dressing changed to LLE, cleansed w/ wound cleanser, Xerofoam dressing applied and wrapped w/ Kerlex. Pt tolerated well.

## 2021-09-03 NOTE — PROGRESS NOTES
Changed LUE dressing, cleaned with wound cleanser, vaseline dressing applied and wrapped with bandage gauze. Emptied Miles. 500 ml. Urine yellow and clear.

## 2021-09-03 NOTE — PROGRESS NOTES
0230- Pt sleeping no acute distress or sob noted, call bell in reach. 0430- Pt cleaned of large bowel movement partial linen change done, bed is in lowest position, call bell in reach. 0539- Pt received PO abt and requested a snack, no other needs voiced. Call bell in reach, james emptied.

## 2021-09-04 VITALS
TEMPERATURE: 98.4 F | BODY MASS INDEX: 22.52 KG/M2 | HEART RATE: 58 BPM | RESPIRATION RATE: 18 BRPM | SYSTOLIC BLOOD PRESSURE: 134 MMHG | DIASTOLIC BLOOD PRESSURE: 46 MMHG | HEIGHT: 66 IN | OXYGEN SATURATION: 97 % | WEIGHT: 140.1 LBS

## 2021-09-04 LAB
GLUCOSE BLD STRIP.AUTO-MCNC: 126 MG/DL (ref 70–110)
GLUCOSE BLD STRIP.AUTO-MCNC: 154 MG/DL (ref 70–110)
MAGNESIUM SERPL-MCNC: 1.8 MG/DL (ref 1.7–2.8)
PERFORMED BY, TECHID: ABNORMAL
PERFORMED BY, TECHID: ABNORMAL

## 2021-09-04 PROCEDURE — 36415 COLL VENOUS BLD VENIPUNCTURE: CPT

## 2021-09-04 PROCEDURE — 74011250637 HC RX REV CODE- 250/637: Performed by: NURSE PRACTITIONER

## 2021-09-04 PROCEDURE — 74011250637 HC RX REV CODE- 250/637: Performed by: INTERNAL MEDICINE

## 2021-09-04 PROCEDURE — 82962 GLUCOSE BLOOD TEST: CPT

## 2021-09-04 PROCEDURE — 83735 ASSAY OF MAGNESIUM: CPT

## 2021-09-04 PROCEDURE — 74011636637 HC RX REV CODE- 636/637: Performed by: INTERNAL MEDICINE

## 2021-09-04 RX ORDER — LORAZEPAM 2 MG/ML
1 CONCENTRATE ORAL
Status: DISCONTINUED | OUTPATIENT
Start: 2021-09-04 | End: 2021-09-04 | Stop reason: HOSPADM

## 2021-09-04 RX ORDER — CLINDAMYCIN HYDROCHLORIDE 300 MG/1
300 CAPSULE ORAL EVERY 8 HOURS
Qty: 4 CAPSULE | Refills: 0 | Status: SHIPPED | OUTPATIENT
Start: 2021-09-04 | End: 2021-09-06

## 2021-09-04 RX ORDER — MORPHINE SULFATE ORAL SOLUTION 10 MG/5ML
5 SOLUTION ORAL
Status: DISCONTINUED | OUTPATIENT
Start: 2021-09-04 | End: 2021-09-04 | Stop reason: HOSPADM

## 2021-09-04 RX ORDER — MORPHINE SULFATE ORAL SOLUTION 10 MG/5ML
1 SOLUTION ORAL
Qty: 10 ML | Refills: 0 | Status: SHIPPED | OUTPATIENT
Start: 2021-09-04 | End: 2021-09-07

## 2021-09-04 RX ORDER — MORPHINE SULFATE ORAL SOLUTION 10 MG/5ML
5 SOLUTION ORAL
Qty: 10 ML | Refills: 0 | Status: SHIPPED | OUTPATIENT
Start: 2021-09-04 | End: 2021-09-04

## 2021-09-04 RX ORDER — LORAZEPAM 2 MG/ML
1 CONCENTRATE ORAL
Qty: 5 ML | Refills: 0 | Status: SHIPPED | OUTPATIENT
Start: 2021-09-04

## 2021-09-04 RX ADMIN — CLINDAMYCIN HYDROCHLORIDE 300 MG: 150 CAPSULE ORAL at 05:01

## 2021-09-04 RX ADMIN — Medication 10 ML: at 05:02

## 2021-09-04 RX ADMIN — APIXABAN 2.5 MG: 2.5 TABLET, FILM COATED ORAL at 09:15

## 2021-09-04 RX ADMIN — MAGNESIUM OXIDE 400 MG (241.3 MG MAGNESIUM) TABLET 400 MG: TABLET at 09:15

## 2021-09-04 RX ADMIN — INSULIN LISPRO 2 UNITS: 100 INJECTION, SOLUTION INTRAVENOUS; SUBCUTANEOUS at 13:21

## 2021-09-04 RX ADMIN — CLINDAMYCIN HYDROCHLORIDE 300 MG: 150 CAPSULE ORAL at 13:21

## 2021-09-04 NOTE — PROGRESS NOTES
Received care of pt at 0700. Pt resting with no complaints or distress. Call bell within reach and bed alarm activated.

## 2021-09-04 NOTE — PROGRESS NOTES
Problem: Falls - Risk of  Goal: *Absence of Falls  Description: Document [place] [facility] Fall Risk and appropriate interventions in the flowsheet. 9/4/2021 1316 by Clarisa Hahn RN  Outcome: Resolved/Met  9/4/2021 0939 by Clarisa Hahn RN  Outcome: Progressing Towards Goal  Note: Fall Risk Interventions:  Mobility Interventions: Bed/chair exit alarm, Patient to call before getting OOB, PT Consult for mobility concerns, Utilize walker, cane, or other assistive device    Mentation Interventions: Bed/chair exit alarm, Adequate sleep, hydration, pain control    Medication Interventions: Bed/chair exit alarm, Patient to call before getting OOB    Elimination Interventions: Bed/chair exit alarm, Call light in reach    History of Falls Interventions: Bed/chair exit alarm         Problem: Patient Education: Go to Patient Education Activity  Goal: Patient/Family Education  Outcome: Resolved/Met     Problem: Nutrition Deficit  Goal: *Optimize nutritional status  Outcome: Resolved/Met     Problem: Pressure Injury - Risk of  Goal: *Prevention of pressure injury  Description: Document Malcom Scale and appropriate interventions in the flowsheet. 9/4/2021 1316 by Clarisa Hahn RN  Outcome: Resolved/Met  9/4/2021 0939 by Clarisa Hahn RN  Outcome: Progressing Towards Goal  Note: Pressure Injury Interventions:  Sensory Interventions: Keep linens dry and wrinkle-free, Maintain/enhance activity level, Minimize linen layers    Moisture Interventions: Minimize layers, Absorbent underpads, Apply protective barrier, creams and emollients    Activity Interventions: Assess need for specialty bed    Mobility Interventions: Turn and reposition approx.  every two hours(pillow and wedges)    Nutrition Interventions: Offer support with meals,snacks and hydration    Friction and Shear Interventions: Minimize layers                Problem: Patient Education: Go to Patient Education Activity  Goal: Patient/Family Education  Outcome: Resolved/Met     Problem: General Medical Care Plan  Goal: *Vital signs within specified parameters  Outcome: Resolved/Met  Goal: *Labs within defined limits  Outcome: Resolved/Met  Goal: *Absence of infection signs and symptoms  Outcome: Resolved/Met  Goal: *Optimal pain control at patient's stated goal  Outcome: Resolved/Met  Goal: *Skin integrity maintained  Outcome: Resolved/Met  Goal: *Fluid volume balance  Outcome: Resolved/Met  Goal: *Optimize nutritional status  Outcome: Resolved/Met  Goal: *Anxiety reduced or absent  Outcome: Resolved/Met  Goal: *Progressive mobility and function (eg: ADL's)  Outcome: Resolved/Met     Problem: Patient Education: Go to Patient Education Activity  Goal: Patient/Family Education  Outcome: Resolved/Met     Problem: Activity Intolerance  Goal: *Oxygen saturation during activity within specified parameters  Outcome: Resolved/Met  Goal: *Able to remain out of bed as prescribed  Outcome: Resolved/Met     Problem: Patient Education: Go to Patient Education Activity  Goal: Patient/Family Education  Outcome: Resolved/Met     Problem: Body Temperature -  Risk of, Imbalanced  Goal: *Absence of heat stress or hyperthermia signs and symptoms  Outcome: Resolved/Met  Goal: *Absence of cold stress or hypothermia signs and symptoms  Outcome: Resolved/Met     Problem: Patient Education: Go to Patient Education Activity  Goal: Patient/Family Education  Outcome: Resolved/Met     Problem: Impaired Skin Integrity/Pressure Injury Treatment  Goal: *Improvement of Existing Pressure Injury  9/4/2021 1316 by Leonard Diaz RN  Outcome: Resolved/Met  9/4/2021 0939 by Leonard Diaz RN  Outcome: Progressing Towards Goal  Goal: *Prevention of pressure injury  Description: Document Malcom Scale and appropriate interventions in the flowsheet.   9/4/2021 1316 by Leonard Diaz RN  Outcome: Resolved/Met  9/4/2021 0939 by Leonard Diaz RN  Outcome: Progressing Towards Goal  Note: Pressure Injury Interventions:  Sensory Interventions: Keep linens dry and wrinkle-free, Maintain/enhance activity level, Minimize linen layers    Moisture Interventions: Minimize layers, Absorbent underpads, Apply protective barrier, creams and emollients    Activity Interventions: Assess need for specialty bed    Mobility Interventions: Turn and reposition approx.  every two hours(pillow and wedges)    Nutrition Interventions: Offer support with meals,snacks and hydration    Friction and Shear Interventions: Minimize layers                Problem: Patient Education: Go to Patient Education Activity  Goal: Patient/Family Education  Outcome: Resolved/Met     Problem: Patient Education: Go to Patient Education Activity  Goal: Patient/Family Education  Outcome: Resolved/Met

## 2021-09-04 NOTE — PROGRESS NOTES
Je will be picking pt up today at 1000 to take him home with [facility]. Pt, pt's wife, Hospice and nurse aware. [id number] were coming[facility] Lyle Cruz states they will be here b/t 1- 2 PM. Made pt, pt's wife, Hospice and nurse aware.

## 2021-09-04 NOTE — PROGRESS NOTES
2 units SSI administered for POC . Patient given sprite and chocolate ice cream for bedtime snack. Scheduled medication administered. Patient resting comfortably, watching television. CBWR.

## 2021-09-04 NOTE — DISCHARGE SUMMARY
Discharge Summary       Patient ID:  Lori Stein,   [de-identified] y.o., male  1940    PCP:  Lele Dunham MD    Admit Date: 8/27/2021  1:20 AM  Discharge Date:  No discharge date for patient encounter. Length of stay: 8 day(s)  Code Status: Full Code  Discharging physician: Yue Amaya MD    Chief Complaint   Patient presents with   [facility] Fall       Discharge Medications  Current Discharge Medication List      START taking these medications    Details   clindamycin (CLEOCIN) 300 mg capsule Take 1 Capsule by mouth every eight (8) hours for 4 doses. Qty: 4 Capsule, Refills: 0  Start date: 9/4/2021, End date: 9/6/2021         CONTINUE these medications which have NOT CHANGED    Details   cyanocobalamin (Vitamin B-12) 100 mcg tablet Take 100 mcg by mouth daily. cholecalciferol (Vitamin D3) 25 mcg (1,000 unit) cap Take 1,000 Units by mouth daily. simvastatin (ZOCOR) 20 mg tablet Take 1 Tablet by mouth nightly. Qty: 30 Tablet, Refills: 1      losartan (COZAAR) 100 mg tablet Take 1 Tablet by mouth daily. Qty: 30 Tablet, Refills: 1      apixaban (ELIQUIS) 2.5 mg tablet Take 1 Tablet by mouth every twelve (12) hours. Qty: 60 Tablet, Refills: 0         STOP taking these medications       hydrALAZINE (APRESOLINE) 50 mg tablet Comments:   Reason for Stopping:         chlorthalidone (HYGROTEN) 50 mg tablet Comments:   Reason for Stopping:                 Reason For admission    Active Problems:    Hypertension (8/10/2021)      Bradycardia (8/27/2021)      Hypothermia (8/27/2021)      Fall (8/27/2021)      Septic shock due to urinary tract infection ([address] (8/27/2021)      DM (diabetes mellitus) ([address] ()      History of DVT (deep vein thrombosis) ()      VLADIMIR (acute kidney injury) ([address] ()         HPI on Admission (per admitting physician):   Lori Stein is a [de-identified] y.o. male with a medical history of dementia, T2DM, HTN, recent DVT who presented to [facility] ED with c/o fall per reports.   The patient is unable to provide information 2/2 dementia and wife is not present at time of evaluation. According to ED report, the patient fell at home and was soon brought to the ED for evaluation. Hospital Course and Discharge Diagnosis   The patient admitted for the following Principal Medical Problem:   Septic shock due to urinary tract infection   Treated with IV antibiotic , finished cefepime course, Acute sepsis has resolved. ,Discontinued Levaquin given prolonged QT interval.  Cellulitis of lower ext has I improved. Continue with PO clindamycin course for 5 days   Also found he had a VLADIMIR (acute kidney injury) Likely 2/2 volume depletion, treated with IVF support and avoiding nephrotoxin, Renal function is continuing to improve.     Hypothermia Resolved with IVF support and worming   Hypertension- to resume home regimen   Fall Recurrent, PT/OT consulted, Fall precautions, CT head negative for acute intracranial abnormalities  History of DVT (deep vein thrombosis) Continue Eliquis  DM (diabetes mellitus) ([facility] [facility]  Hypernatremia Improved with IVF support   Elevated cardiac enzymes  There is a slight increase in troponins but I suspect this was due to renal failure.  He has no chest pain.  He does have a prolonged QT.  He has no signs of fluid overload at this time  Episode of Bradycardia Stable  Bilateral lower extremity wounds present on admission note these or not decubitus wounds  Continue clindamycin 5 day  Completed 4 days of cefepime.   Generalized debility:     He understands that his body is failing generally, including his mind, and that he does not want to spend his remaining days in hospital or snf or ltc if it can be avoided. He is hoping hospice is able to allow him to stay at home comfortably and safely, and focus on quality of life not quantity. D/w         The patient condition became clinically stable and No new complain or complication during the hospital course.     The Medication changes discussed with the patient and family on the discharge    Condition at discharge   Afebrile  Ambulating  Eating, Drinking, Voiding  Stable      Physical Exam on Discharge:  Visit Vitals  BP (!) 155/55 (BP 1 Location: Right upper arm, BP Patient Position: At rest)   Pulse (!) 54   Temp 97.3 °F (36.3 °C)   Resp 20   Ht 5' 6\" (1.676 m)   Wt 63.5 kg (140 lb 1.6 oz)   SpO2 97%   BMI 22.61 kg/m²       General Appearance:   Appears in no acute distress. , Sitting up.,   Skin:   Skin warm & dry, No rash, No jaundice,   Lymph: There is no lymphadenopathy,   HEENT:   PERRLA, EOMI, Moist oral mucous membranes, conjunctiva clear,   Neck:   Supple, Without masses,   Lungs:   Clear, No wheezes. , No rales. , Normal respiratory effort,   Heart:   Regular rate and rhythm, No gallop,   Abdomen:   Soft , Non-distended, Normal bowel sounds and Non-tender,   Extremities:  - edema of legs, Normal pedal and radial pulses,   Neuro:    alert, oriented, affect appropriate, speech fluent, cranial nerves intact, no focal neurological deficits and moves all extremities well    Procedures:    No discharge procedures on file.      Consultants/Treatment Team:    Treatment Team: Attending Provider: Calli Pastrana MD; Consulting Provider: Emma Badillo NP; Consulting Provider: Catherine Toribio MD; Utilization Review: Val Rea RN; Student Nurse: Salas Wood RN; Primary Nurse: Ernesto Song; Primary Nurse: Edward Suarez; Primary Nurse: Felipe Loredo RN    Disposition:    Home with [facility] - bridging to hospice     Follow Up   Darrius Madrigal MD    Most Recent Labs:  Recent Results (from the past 24 hour(s))   GLUCOSE, POC    Collection Time: 09/03/21 10:54 AM   Result Value Ref Range    Glucose (POC) 169 (H) 70 - 110 mg/dL    Performed by Shannon Zhao, POC    Collection Time: 09/03/21  4:00 PM   Result Value Ref Range    Glucose (POC) 152 (H) 70 - 110 mg/dL    Performed by Maribell Duckworth GLUCOSE, POC    Collection Time: 09/03/21  9:03 PM   Result Value Ref Range    Glucose (POC) 177 (H) 70 - 110 mg/dL    Performed by Juan F López    Collection Time: 09/04/21  4:30 AM   Result Value Ref Range    Magnesium 1.8 1.7 - 2.8 mg/dL   GLUCOSE, POC    Collection Time: 09/04/21  8:03 AM   Result Value Ref Range    Glucose (POC) 126 (H) 70 - 110 mg/dL    Performed by [employer]      No results for input(s): BUN, NA, POTASSIUM, CHLORIDE, CO2 in the last 72 hours. No lab exists for component: GLUCOSE, CALCIUM, CREAT  No results for input(s): WBC, RBC, HCT, MCV, MCH, MCHC, RDW, HCTEXT, HCTEXT in the last 72 hours. No lab exists for component: HEMOGLOBIN, PLATELET    XR Results:  Results from Hospital Encounter encounter on 08/27/21    XR CHEST PORT    Narrative  EXAM: CHEST RADIOGRAPH, SINGLE VIEW    CLINICAL INDICATION/HISTORY: Chest pain    COMPARISON: 8/10/2021    TECHNIQUE: Portable frontal view of the chest was obtained.    _______________    FINDINGS:    SUPPORT DEVICES: None. HEART AND MEDIASTINUM: Cardiomediastinal silhouette appears mildly enlarged,  unchanged. Tortuous and atherosclerotic thoracic aorta. No central vascular  congestion. LUNGS AND PLEURAL SPACES: Bandlike atelectasis within left and right lung bases. No acute pulmonary infiltrate. No pleural effusion or pneumothorax. BONY THORAX AND SOFT TISSUES: No acute osseous abnormality. _______________    Impression  No active cardiopulmonary disease. CT Results:  Results from Hospital Encounter encounter on 08/27/21    CT HEAD WO CONT    Narrative  EXAM: CT HEAD, WITHOUT IV CONTRAST    INDICATION: Fall with posttraumatic headache    COMPARISON: 8/10/2021    TECHNIQUE: Multiple axial CT images of the head were obtained extending from the  skull base through the vertex. Additional coronal and sagittal reformations were  also performed.  One or more dose reduction techniques were used on this CT:  automated exposure control, adjustment of the mAs and/or kVp according to  patient size, and iterative reconstruction techniques. The specific techniques  used on this CT exam have been documented in the patient's electronic medical  record. Digital Imaging and Communications in Medicine (DICOM) format image  data are available to nonaffiliated external healthcare facilities or entities  on a secure, media free, reciprocally searchable basis with patient  authorization for at least a 12-month period after this study. _______________    FINDINGS:    BRAIN AND POSTERIOR FOSSA: There is generalized prominence of the ventricular  system, associated with proportional widening of the cortical cerebral sulci,  compatible with generalized volume loss. Hazy hypoattenuation identified along  the periventricular white matter, a nonspecific finding which is most commonly  encountered in the setting of chronic microvascular disease. There is no  intracranial hemorrhage, mass effect, or midline shift. There are no  significant additional areas of abnormal parenchymal attenuation. EXTRA-AXIAL SPACES AND MENINGES: There are no abnormal extra-axial fluid  collections. CALVARIUM: No acute osseous abnormality. OTHER: The visualized portions of the paranasal sinuses and mastoid air cells  are clear.    _______________    Impression  1. No CT evidence of an acute intracranial abnormality or other findings  related to recent trauma. 2.  Mild cerebral atrophy/volume loss and periventricular white matter changes,  most commonly seen with chronic microvascular disease. MRI Results:  No results found for this or any previous visit. Nuclear Medicine Results:  No results found for this or any previous visit.         Mick Santiago MD   Hospitalist

## 2021-09-04 NOTE — PROGRESS NOTES
Noticed pt has not had a documented BM since 8/27. Pt does not remember last BM. Encouraged pt to take PRN miralax. Education provided, pt adamantly refuses.

## 2021-09-04 NOTE — PROGRESS NOTES
Bedside shift change report given to Damion Morales RN by Pepe Real RN. Report included the SBAR. Patient sitting upright in bed with eyes closed. Responds to voice and touch. White board updated. CBWR.

## 2021-09-04 NOTE — PROGRESS NOTES
Patient will be discharging to home on home hospice. Morpine and lorazepam was added to med rec for pain, anxiety, restlessness, and such symptom control in end of life care, and for comfort care.

## 2021-09-04 NOTE — PROGRESS NOTES
LUE and LLE wounds cleansed with wound cleanser. Xeroform applied, covered with gauze, and wrapped with curlex. 950 ml clear yellow urine emptied from james catheter. Medication administered with sprite. Patient clean and comfortable. Denies pain. CBWR.

## 2021-09-04 NOTE — PROGRESS NOTES
Problem: Falls - Risk of  Goal: *Absence of Falls  Description: Document Frida Beck Fall Risk and appropriate interventions in the flowsheet. Outcome: Progressing Towards Goal  Note: Fall Risk Interventions:  Mobility Interventions: Bed/chair exit alarm, Patient to call before getting OOB, PT Consult for mobility concerns, Utilize walker, cane, or other assistive device    Mentation Interventions: Bed/chair exit alarm, Adequate sleep, hydration, pain control    Medication Interventions: Bed/chair exit alarm, Patient to call before getting OOB    Elimination Interventions: Bed/chair exit alarm, Call light in reach    History of Falls Interventions: Bed/chair exit alarm         Problem: Pressure Injury - Risk of  Goal: *Prevention of pressure injury  Description: Document Malcom Scale and appropriate interventions in the flowsheet. Outcome: Progressing Towards Goal  Note: Pressure Injury Interventions:  Sensory Interventions: Keep linens dry and wrinkle-free, Maintain/enhance activity level, Minimize linen layers    Moisture Interventions: Minimize layers, Absorbent underpads, Apply protective barrier, creams and emollients    Activity Interventions: Assess need for specialty bed    Mobility Interventions: Turn and reposition approx. every two hours(pillow and wedges)    Nutrition Interventions: Offer support with meals,snacks and hydration    Friction and Shear Interventions: Minimize layers                Problem: Impaired Skin Integrity/Pressure Injury Treatment  Goal: *Improvement of Existing Pressure Injury  Outcome: Progressing Towards Goal  Goal: *Prevention of pressure injury  Description: Document Malcom Scale and appropriate interventions in the flowsheet.   Outcome: Progressing Towards Goal  Note: Pressure Injury Interventions:  Sensory Interventions: Keep linens dry and wrinkle-free, Maintain/enhance activity level, Minimize linen layers    Moisture Interventions: Minimize layers, Absorbent underpads, Apply protective barrier, creams and emollients    Activity Interventions: Assess need for specialty bed    Mobility Interventions: Turn and reposition approx.  every two hours(pillow and wedges)    Nutrition Interventions: Offer support with meals,snacks and hydration    Friction and Shear Interventions: Minimize layers

## 2021-09-29 ENCOUNTER — APPOINTMENT (OUTPATIENT)
Dept: GENERAL RADIOLOGY | Age: 81
DRG: 871 | End: 2021-09-29
Attending: INTERNAL MEDICINE
Payer: MEDICARE

## 2021-09-29 ENCOUNTER — HOSPITAL ENCOUNTER (INPATIENT)
Age: 81
LOS: 8 days | Discharge: HOME HEALTH | DRG: 871 | End: 2021-10-07
Attending: INTERNAL MEDICINE | Admitting: INTERNAL MEDICINE
Payer: MEDICARE

## 2021-09-29 DIAGNOSIS — R65.21 SEPTIC SHOCK (HCC): Primary | ICD-10-CM

## 2021-09-29 DIAGNOSIS — N17.9 ACUTE KIDNEY INJURY (HCC): ICD-10-CM

## 2021-09-29 DIAGNOSIS — A41.9 SEPTIC SHOCK (HCC): Primary | ICD-10-CM

## 2021-09-29 PROBLEM — R77.8 ELEVATED TROPONIN: Status: ACTIVE | Noted: 2021-09-29

## 2021-09-29 PROBLEM — N39.0 SEPSIS DUE TO URINARY TRACT INFECTION (HCC): Status: ACTIVE | Noted: 2021-09-29

## 2021-09-29 LAB
ALBUMIN SERPL-MCNC: 2.2 G/DL (ref 3.5–4.7)
ALBUMIN/GLOB SERPL: 0.6 {RATIO}
ALP SERPL-CCNC: 90 U/L (ref 38–126)
ALT SERPL-CCNC: 19 U/L (ref 3–72)
ANION GAP SERPL CALC-SCNC: 16 MMOL/L
APPEARANCE UR: ABNORMAL
AST SERPL W P-5'-P-CCNC: 40 U/L (ref 17–74)
BACTERIA URNS QL MICRO: ABNORMAL /HPF
BASOPHILS # BLD: 0 K/UL (ref 0–0.1)
BASOPHILS NFR BLD: 0 % (ref 0–2)
BILIRUB DIRECT SERPL-MCNC: 0.1 MG/DL (ref 0–0.3)
BILIRUB SERPL-MCNC: 0.5 MG/DL (ref 0.2–1)
BILIRUB UR QL: ABNORMAL
BUN SERPL-MCNC: 77 MG/DL (ref 9–21)
BUN/CREAT SERPL: 17
CA-I BLD-MCNC: 7.7 MG/DL (ref 8.5–10.5)
CHLORIDE SERPL-SCNC: 98 MMOL/L (ref 94–111)
CO2 SERPL-SCNC: 22 MMOL/L (ref 21–33)
COLOR UR: ABNORMAL
CREAT SERPL-MCNC: 4.6 MG/DL (ref 0.8–1.5)
DIFFERENTIAL METHOD BLD: ABNORMAL
EOSINOPHIL # BLD: 0 K/UL (ref 0–0.4)
EOSINOPHIL NFR BLD: 0 % (ref 0–5)
EPITH CASTS URNS QL MICRO: ABNORMAL /LPF (ref 0–20)
ERYTHROCYTE [DISTWIDTH] IN BLOOD BY AUTOMATED COUNT: 14.3 % (ref 11.6–14.5)
GLOBULIN SER CALC-MCNC: 3.4 G/DL
GLUCOSE SERPL-MCNC: 132 MG/DL (ref 70–110)
GLUCOSE UR STRIP.AUTO-MCNC: NEGATIVE MG/DL
HCT VFR BLD AUTO: 29.3 % (ref 36–48)
HGB BLD-MCNC: 10.4 G/DL (ref 13–16)
HGB UR QL STRIP: ABNORMAL
IMM GRANULOCYTES # BLD AUTO: 0 K/UL
IMM GRANULOCYTES NFR BLD AUTO: 0 %
KETONES UR QL STRIP.AUTO: NEGATIVE MG/DL
LACTATE SERPL-SCNC: 1.4 MMOL/L (ref 0.5–2)
LEUKOCYTE ESTERASE UR QL STRIP.AUTO: ABNORMAL
LYMPHOCYTES # BLD: 4.9 K/UL (ref 0.9–3.6)
LYMPHOCYTES NFR BLD: 10 % (ref 21–52)
MCH RBC QN AUTO: 30.1 PG (ref 24–34)
MCHC RBC AUTO-ENTMCNC: 35.5 G/DL (ref 31–37)
MCV RBC AUTO: 84.7 FL (ref 78–100)
MIXED CELL CASTS URNS QL MICRO: ABNORMAL /LPF
MONOCYTES # BLD: 1.5 K/UL (ref 0.05–1.2)
MONOCYTES NFR BLD: 3 % (ref 3–10)
NEUTS BAND NFR BLD MANUAL: 3 % (ref 0–5)
NEUTS SEG # BLD: 42.8 K/UL (ref 1.8–8)
NEUTS SEG NFR BLD: 84 % (ref 40–73)
NITRITE UR QL STRIP.AUTO: NEGATIVE
NRBC # BLD: 0 K/UL (ref 0–0.01)
NRBC BLD-RTO: 0 PER 100 WBC
PH UR STRIP: 5 [PH] (ref 5–8)
PLATELET # BLD AUTO: 506 K/UL (ref 135–420)
PLATELET COMMENTS,PCOM: ABNORMAL
PMV BLD AUTO: 11.9 FL (ref 9.2–11.8)
POTASSIUM SERPL-SCNC: 3.7 MMOL/L (ref 3.2–5.1)
PROCALCITONIN SERPL-MCNC: 7.3 NG/ML (ref 0.5–2)
PROT SERPL-MCNC: 5.6 G/DL (ref 6.1–8.4)
PROT UR STRIP-MCNC: 100 MG/DL
RBC # BLD AUTO: 3.46 M/UL (ref 4.35–5.65)
RBC #/AREA URNS HPF: ABNORMAL /HPF (ref 0–2)
RBC MORPH BLD: ABNORMAL
SODIUM SERPL-SCNC: 136 MMOL/L (ref 135–145)
SP GR UR REFRACTOMETRY: 1.02 (ref 1–1.03)
TROPONIN I SERPL-MCNC: 0.35 NG/ML (ref 0.02–0.05)
TROPONIN I SERPL-MCNC: 0.65 NG/ML (ref 0.02–0.05)
UROBILINOGEN UR QL STRIP.AUTO: 1 EU/DL (ref 0.2–1)
WBC # BLD AUTO: 49.2 K/UL (ref 4.6–13.2)
WBC URNS QL MICRO: ABNORMAL /HPF (ref 0–4)

## 2021-09-29 PROCEDURE — 96374 THER/PROPH/DIAG INJ IV PUSH: CPT

## 2021-09-29 PROCEDURE — 81001 URINALYSIS AUTO W/SCOPE: CPT

## 2021-09-29 PROCEDURE — 51702 INSERT TEMP BLADDER CATH: CPT

## 2021-09-29 PROCEDURE — 87186 SC STD MICRODIL/AGAR DIL: CPT

## 2021-09-29 PROCEDURE — 36415 COLL VENOUS BLD VENIPUNCTURE: CPT

## 2021-09-29 PROCEDURE — 74011250636 HC RX REV CODE- 250/636: Performed by: NURSE PRACTITIONER

## 2021-09-29 PROCEDURE — 74011250636 HC RX REV CODE- 250/636: Performed by: INTERNAL MEDICINE

## 2021-09-29 PROCEDURE — 87040 BLOOD CULTURE FOR BACTERIA: CPT

## 2021-09-29 PROCEDURE — 84484 ASSAY OF TROPONIN QUANT: CPT

## 2021-09-29 PROCEDURE — 85025 COMPLETE CBC W/AUTO DIFF WBC: CPT

## 2021-09-29 PROCEDURE — 96360 HYDRATION IV INFUSION INIT: CPT

## 2021-09-29 PROCEDURE — 80048 BASIC METABOLIC PNL TOTAL CA: CPT

## 2021-09-29 PROCEDURE — 87086 URINE CULTURE/COLONY COUNT: CPT

## 2021-09-29 PROCEDURE — 74011000258 HC RX REV CODE- 258: Performed by: INTERNAL MEDICINE

## 2021-09-29 PROCEDURE — 99285 EMERGENCY DEPT VISIT HI MDM: CPT

## 2021-09-29 PROCEDURE — 83605 ASSAY OF LACTIC ACID: CPT

## 2021-09-29 PROCEDURE — 80076 HEPATIC FUNCTION PANEL: CPT

## 2021-09-29 PROCEDURE — 71045 X-RAY EXAM CHEST 1 VIEW: CPT

## 2021-09-29 PROCEDURE — 87077 CULTURE AEROBIC IDENTIFY: CPT

## 2021-09-29 PROCEDURE — 84145 PROCALCITONIN (PCT): CPT

## 2021-09-29 PROCEDURE — 65270000029 HC RM PRIVATE

## 2021-09-29 PROCEDURE — 93005 ELECTROCARDIOGRAM TRACING: CPT

## 2021-09-29 RX ORDER — LOSARTAN POTASSIUM 25 MG/1
100 TABLET ORAL DAILY
Status: DISCONTINUED | OUTPATIENT
Start: 2021-09-30 | End: 2021-10-07 | Stop reason: HOSPADM

## 2021-09-29 RX ORDER — MORPHINE SULFATE ORAL SOLUTION 10 MG/5ML
0.25 SOLUTION ORAL
COMMUNITY
Start: 2021-09-13

## 2021-09-29 RX ORDER — SODIUM CHLORIDE 0.9 % (FLUSH) 0.9 %
5-40 SYRINGE (ML) INJECTION AS NEEDED
Status: DISCONTINUED | OUTPATIENT
Start: 2021-09-29 | End: 2021-10-07 | Stop reason: HOSPADM

## 2021-09-29 RX ORDER — SODIUM CHLORIDE 9 MG/ML
125 INJECTION, SOLUTION INTRAVENOUS CONTINUOUS
Status: DISCONTINUED | OUTPATIENT
Start: 2021-09-29 | End: 2021-09-30

## 2021-09-29 RX ORDER — LEVOFLOXACIN 5 MG/ML
750 INJECTION, SOLUTION INTRAVENOUS EVERY 24 HOURS
Status: DISCONTINUED | OUTPATIENT
Start: 2021-09-30 | End: 2021-09-30

## 2021-09-29 RX ORDER — SIMVASTATIN 20 MG/1
20 TABLET, FILM COATED ORAL
Status: DISCONTINUED | OUTPATIENT
Start: 2021-09-30 | End: 2021-09-30

## 2021-09-29 RX ORDER — LORAZEPAM 2 MG/ML
1 INJECTION INTRAMUSCULAR
Status: DISCONTINUED | OUTPATIENT
Start: 2021-09-29 | End: 2021-10-07 | Stop reason: HOSPADM

## 2021-09-29 RX ORDER — SODIUM CHLORIDE 0.9 % (FLUSH) 0.9 %
5-40 SYRINGE (ML) INJECTION EVERY 8 HOURS
Status: DISCONTINUED | OUTPATIENT
Start: 2021-09-29 | End: 2021-10-07 | Stop reason: HOSPADM

## 2021-09-29 RX ORDER — LEVOFLOXACIN 5 MG/ML
750 INJECTION, SOLUTION INTRAVENOUS EVERY 24 HOURS
Status: DISCONTINUED | OUTPATIENT
Start: 2021-09-29 | End: 2021-09-30

## 2021-09-29 RX ORDER — ACETAMINOPHEN 650 MG/1
650 SUPPOSITORY RECTAL
Status: DISCONTINUED | OUTPATIENT
Start: 2021-09-29 | End: 2021-10-07 | Stop reason: HOSPADM

## 2021-09-29 RX ORDER — UREA 10 %
100 LOTION (ML) TOPICAL DAILY
Status: DISCONTINUED | OUTPATIENT
Start: 2021-09-30 | End: 2021-09-30

## 2021-09-29 RX ORDER — GLUCOSAMINE SULFATE 1500 MG
1000 POWDER IN PACKET (EA) ORAL DAILY
Status: DISCONTINUED | OUTPATIENT
Start: 2021-09-30 | End: 2021-09-30

## 2021-09-29 RX ORDER — SODIUM CHLORIDE 0.9 % (FLUSH) 0.9 %
5-10 SYRINGE (ML) INJECTION AS NEEDED
Status: DISCONTINUED | OUTPATIENT
Start: 2021-09-29 | End: 2021-10-07

## 2021-09-29 RX ORDER — POLYETHYLENE GLYCOL 3350 17 G/17G
17 POWDER, FOR SOLUTION ORAL DAILY PRN
Status: DISCONTINUED | OUTPATIENT
Start: 2021-09-29 | End: 2021-10-07 | Stop reason: HOSPADM

## 2021-09-29 RX ORDER — HEPARIN SODIUM 5000 [USP'U]/ML
5000 INJECTION, SOLUTION INTRAVENOUS; SUBCUTANEOUS EVERY 8 HOURS
Status: DISCONTINUED | OUTPATIENT
Start: 2021-09-29 | End: 2021-09-30

## 2021-09-29 RX ORDER — ONDANSETRON 4 MG/1
4 TABLET, ORALLY DISINTEGRATING ORAL
Status: DISCONTINUED | OUTPATIENT
Start: 2021-09-29 | End: 2021-10-07 | Stop reason: HOSPADM

## 2021-09-29 RX ORDER — ACETAMINOPHEN 325 MG/1
650 TABLET ORAL
Status: DISCONTINUED | OUTPATIENT
Start: 2021-09-29 | End: 2021-10-07 | Stop reason: HOSPADM

## 2021-09-29 RX ORDER — ONDANSETRON 2 MG/ML
4 INJECTION INTRAMUSCULAR; INTRAVENOUS
Status: DISCONTINUED | OUTPATIENT
Start: 2021-09-29 | End: 2021-10-07 | Stop reason: HOSPADM

## 2021-09-29 RX ADMIN — SODIUM CHLORIDE 770 ML: 9 INJECTION, SOLUTION INTRAVENOUS at 20:43

## 2021-09-29 RX ADMIN — SODIUM CHLORIDE 1000 ML: 9 INJECTION, SOLUTION INTRAVENOUS at 19:00

## 2021-09-29 RX ADMIN — SODIUM CHLORIDE 125 ML/HR: 9 INJECTION, SOLUTION INTRAVENOUS at 22:32

## 2021-09-29 RX ADMIN — Medication 10 ML: at 23:30

## 2021-09-29 RX ADMIN — LEVOFLOXACIN 750 MG: 5 INJECTION, SOLUTION INTRAVENOUS at 20:42

## 2021-09-29 RX ADMIN — CEFEPIME HYDROCHLORIDE 2 G: 2 INJECTION, POWDER, FOR SOLUTION INTRAVENOUS at 19:47

## 2021-09-29 RX ADMIN — SODIUM CHLORIDE 1000 ML: 9 INJECTION, SOLUTION INTRAVENOUS at 16:54

## 2021-09-29 RX ADMIN — HEPARIN SODIUM 5000 UNITS: 5000 INJECTION INTRAVENOUS; SUBCUTANEOUS at 23:29

## 2021-09-29 NOTE — ED TRIAGE NOTES
Per EMS they were called out for bleeding around james insertion site, pt hypotensive upon their arrival to home to  pt, pt has history of dementia per EMS.

## 2021-09-29 NOTE — ED NOTES
Pt's wife refusing all care at this time, refusing lab collection or nursing to perform any care, states she wants him at [facility] and will not allow anything to be done here at this time.

## 2021-09-29 NOTE — Clinical Note
Status[de-identified] INPATIENT [101]   Type of Bed: Telemetry [19]   Cardiac Monitoring Required?: Yes   Inpatient Hospitalization Certified Necessary for the Following Reasons: 3.  Patient receiving treatment that can only be provided in an inpatient setting (further clarification in H&P documentation)   Admitting Diagnosis: Septic shock [facility] [[id number]]   Admitting Diagnosis: Acute kidney injury [facility] [[id number]]   Admitting Physician: Silvia Li [[id number]]   Attending Physician: Silvia Li [[id number]]   Estimated Length of Stay: 3-4 Midnights   Discharge Plan[de-identified] Home with Office Follow-up

## 2021-09-29 NOTE — ED NOTES
This nurse removed pt's james that was intact upon arrival to the ED, upon attempt to insert new james catheter this nurse was unable to advance it into the bladder.

## 2021-09-29 NOTE — ED PROVIDER NOTES
EMERGENCY DEPARTMENT HISTORY AND PHYSICAL EXAM      Date: 9/29/2021  Patient Name: Jean Pavon      History of Presenting Illness     Chief Complaint   Patient presents with    Urinary Catheter Problem       History Provided By: Patient and EMS. HPI: Jean Pavon, [de-identified] y.o. male with a past medical history significant for hypertension, dementia, diabetes, right bundle branch block, chronic indwelling Miles, hyperlipidemia that presents to the ED with cc of blood from the urethral meatus of his chronic indwelling Miles. EMS was not able to get much information from the patient's wife other than he is bleeding from the penis. Patient was admitted here 8/27/2021 and was discharged on hospice on morphine and lorazepam.  His diagnoses were septic shock due to urinary tract infection, acute kidney injury, hypothermia, hypertension, history of DVT on Eliquis, diabetes mellitus, hyponatremia, elevated cardiac enzymes due to renal failure, prolonged QT mild bilateral lower extremity wounds, generalized debility. Covid ruled out. There are no other complaints, changes, or physical findings at this time.     PCP: Amara Lopez MD    Current Facility-Administered Medications   Medication Dose Route Frequency Provider Last Rate Last Admin    sodium chloride (NS) flush 5-10 mL  5-10 mL IntraVENous PRN Jennifer Hidalgo MD        cefepime (MAXIPIME) 2 g in 0.9% sodium chloride (MBP/ADV) 100 mL MBP  2 g IntraVENous Q8H Cuate Fernandez  mL/hr at 09/29/21 1947 2 g at 09/29/21 1947    levoFLOXacin (LEVAQUIN) 750 mg in D5W IVPB  750 mg IntraVENous Q24H Jon Fernandez MD        sodium chloride 0.9 % bolus infusion 770 mL  770 mL IntraVENous ONCE Jon Fernandez MD        NOREPINephrine (LEVOPHED) 16,000 mcg in dextrose 5% 250 mL infusion  2-16 mcg/min IntraVENous TITRATE Jon Fernandez MD         Current Outpatient Medications   Medication Sig Dispense Refill    LORazepam (INTENSOL) 2 mg/mL concentrated solution Take 0.5 mL by mouth every four (4) hours as needed for Agitation, Anxiety or Restlessness. Max Daily Amount: 6 mg. 5 mL 0    cyanocobalamin (Vitamin B-12) 100 mcg tablet Take 100 mcg by mouth daily.  cholecalciferol (Vitamin D3) 25 mcg (1,000 unit) cap Take 1,000 Units by mouth daily.  simvastatin (ZOCOR) 20 mg tablet Take 1 Tablet by mouth nightly. 30 Tablet 1    losartan (COZAAR) 100 mg tablet Take 1 Tablet by mouth daily. 30 Tablet 1    apixaban (ELIQUIS) 2.5 mg tablet Take 1 Tablet by mouth every twelve (12) hours. 60 Tablet 0       Past History     Past Medical History:  Past Medical History:   Diagnosis Date    Abnormality of gait     Benign neoplasm of colon     Depression     DM (diabetes mellitus) ([facility] [address]     Headache     Hypertension     Nonspecific (abnormal) findings on radiological and other examination of genitourinary organs     Other and unspecified hyperlipidemia     Personal history of fall     Right bundle branch block and left anterior fascicular block        Past Surgical History:  Past Surgical History:   Procedure Laterality Date    COLONOSCOPY N/A 3/5/2018    COLONOSCOPY w/ biopsies w/ polypectomy performed by Christianne Nesbitt MD at [facility]    HX CATARACT REMOVAL      HX CHOLECYSTECTOMY      HX COLONOSCOPY  3/12/2015    HX TONSILLECTOMY         Family History:  Family History   Problem Relation Age of Onset    Diabetes Mother     Alzheimer Mother     Cancer Father         Lung Cancer       Social History:  Social History     Tobacco Use    Smoking status: Former Smoker     Types: Cigarettes    Smokeless tobacco: Never Used    Tobacco comment: Quit 40 years ago    Vaping Use    Vaping Use: Never used   Substance Use Topics    Alcohol use: Not Currently     Alcohol/week: 0.0 standard drinks    Drug use: No       Allergies:   Allergies   Allergen Reactions    Oxycontin [Oxycodone] Other (comments)     hallucinations    Vicodin [Hydrocodone-Acetaminophen] Other (comments)           Review of Systems   Unable to perform ROS: Dementia       Physical Exam     Physical Exam  Vitals and nursing note reviewed. Constitutional:       Appearance: He is not ill-appearing or diaphoretic. Comments: Thin, chronically ill-appearing male with boots on both legs for his decubitus ulcers on his feet. Patient knows that he is in the hospital, he knows it is 2021. Although his blood pressure is low he is not symptomatic from it   HENT:      Mouth/Throat:      Pharynx: Oropharynx is clear. Eyes:      Pupils: Pupils are equal, round, and reactive to light. Cardiovascular:      Rate and Rhythm: Normal rate and regular rhythm. Pulmonary:      Breath sounds: Normal breath sounds. Abdominal:      Palpations: Abdomen is soft. Tenderness: There is no abdominal tenderness. Genitourinary:     Comments: Patient has a Miles catheter in and is bleeding from the urethral meatus. It has stopped bleeding at this time. Blood is old  Musculoskeletal:      Cervical back: Neck supple. Right lower leg: No edema. Left lower leg: No edema. Skin:     General: Skin is warm and dry. Capillary Refill: Capillary refill takes 2 to 3 seconds.    Psychiatric:         Behavior: Behavior normal.         Lab and Diagnostic Study Results     Labs -     Recent Results (from the past 12 hour(s))   CBC WITH AUTOMATED DIFF    Collection Time: 09/29/21  4:50 PM   Result Value Ref Range    WBC 49.2 (H) 4.6 - 13.2 K/uL    RBC 3.46 (L) 4.35 - 5.65 M/uL    HGB 10.4 (L) 13.0 - 16.0 g/dL    HCT 29.3 (L) 36.0 - 48.0 %    MCV 84.7 78.0 - 100.0 FL    MCH 30.1 24.0 - 34.0 PG    MCHC 35.5 31.0 - 37.0 g/dL    RDW 14.3 11.6 - 14.5 %    PLATELET 330 (H) 207 - 420 K/uL    MPV 11.9 (H) 9.2 - 11.8 FL    NRBC 0.0 0.0  WBC    ABSOLUTE NRBC 0.00 0.00 - 0.01 K/uL    NEUTROPHILS 84 (H) 40 - 73 %    BAND NEUTROPHILS 3 0 - 5 % LYMPHOCYTES 10 (L) 21 - 52 %    MONOCYTES 3 3 - 10 %    EOSINOPHILS 0 0 - 5 %    BASOPHILS 0 0 - 2 %    IMMATURE GRANULOCYTES 0 %    ABS. NEUTROPHILS 42.8 (H) 1.8 - 8.0 K/UL    ABS. LYMPHOCYTES 4.9 (H) 0.9 - 3.6 K/UL    ABS. MONOCYTES 1.5 (H) 0.05 - 1.2 K/UL    ABS. EOSINOPHILS 0.0 0.0 - 0.4 K/UL    ABS. BASOPHILS 0.0 0.0 - 0.1 K/UL    ABS. IMM. GRANS. 0.0 K/UL    DF Manual      PLATELET COMMENTS Clumped Platelets      RBC COMMENTS Anisocytosis  1+       METABOLIC PANEL, BASIC    Collection Time: 09/29/21  4:50 PM   Result Value Ref Range    Sodium 136 135 - 145 mmol/L    Potassium 3.7 3.2 - 5.1 mmol/L    Chloride 98 94 - 111 mmol/L    CO2 22 21 - 33 mmol/L    Anion gap 16 mmol/L    Glucose 132 (H) 70 - 110 mg/dL    BUN 77 (H) 9 - 21 mg/dL    Creatinine 4.60 (H) 0.8 - 1.50 mg/dL    BUN/Creatinine ratio 17      GFR est AA 15 ml/min/1.73m2    GFR est non-AA 12 ml/min/1.73m2    Calcium 7.7 (L) 8.5 - 10.5 mg/dL   HEPATIC FUNCTION PANEL    Collection Time: 09/29/21  4:50 PM   Result Value Ref Range    Protein, total 5.6 (L) 6.1 - 8.4 g/dL    Albumin 2.2 (L) 3.5 - 4.7 g/dL    Globulin 3.4 g/dL    A-G Ratio 0.6      Bilirubin, total 0.5 0.2 - 1.0 mg/dL    Bilirubin, direct 0.1 0.0 - 0.3 mg/dL    Alk.  phosphatase 90 38 - 126 U/L    AST (SGOT) 40 17 - 74 U/L    ALT (SGPT) 19 3 - 72 U/L   LACTIC ACID    Collection Time: 09/29/21  4:50 PM   Result Value Ref Range    Lactic acid 1.4 0.5 - 2.0 mmol/L   PROCALCITONIN    Collection Time: 09/29/21  4:50 PM   Result Value Ref Range    Procalcitonin 7.30 (HH) 0.5 - 2.0 ng/mL   TROPONIN I    Collection Time: 09/29/21  4:50 PM   Result Value Ref Range    Troponin-I, Qt. 0.65 (H) 0.02 - 0.05 ng/mL   URINALYSIS W/ RFLX MICROSCOPIC    Collection Time: 09/29/21  7:24 PM   Result Value Ref Range    Color Dark Yellow      Appearance Turbid      Specific gravity 1.020 1.005 - 1.030      pH (UA) 5.0 5.0 - 8.0      Protein 100 (A) Negative mg/dL    Glucose Negative Negative mg/dL    Ketone Negative Negative mg/dL    Bilirubin Small (A) Negative      Blood Large (A) Negative      Urobilinogen 1.0 0.2 - 1.0 EU/dL    Nitrites Negative Negative      Leukocyte Esterase Small (A) Negative     URINE MICROSCOPIC    Collection Time: 09/29/21  7:24 PM   Result Value Ref Range    WBC 5-10 0 - 4 /hpf    RBC 20-50 0 - 2 /hpf    Epithelial cells Few 0 - 20 /lpf    Bacteria 3+ (A) None /hpf    Mixed Cell Cast 5-10 /lpf       Radiologic Studies -   [unfilled]  CT Results  (Last 48 hours)    None        CXR Results  (Last 48 hours)    None          Medical Decision Making and ED Course   - I am the first and primary provider for this patient AND AM THE PRIMARY PROVIDER OF RECORD. - I reviewed the vital signs, available nursing notes, past medical history, past surgical history, family history and social history. - Initial assessment performed. The patients presenting problems have been discussed, and the staff are in agreement with the care plan formulated and outlined with them. I have encouraged them to ask questions as they arise throughout their visit. Vital Signs-Reviewed the patient's vital signs. Patient Vitals for the past 12 hrs:   Temp Pulse Resp BP SpO2   09/29/21 1915 -- 67 16 (!) 112/51 97 %   09/29/21 1637 98 °F (36.7 °C) 65 15 (!) 87/42 97 %       EKG interpretation: (Preliminary): Performed at 1920  Rhythm: normal sinus rhythm; and regular . Rate (approx.): 58; Axis: left axis deviation; ID interval: normal; QRS interval: prolonged; ST/T wave: non-specific changes; Other findings: QTc 474. RBBB; no acute ST changes. .      Records Reviewed: Nursing Notes     ED Course:   3535: Wife came to the ER and is very upset. She states that she told EMS that she did not want pt brought to this hospital and that she wanted to go to [place] or [facility]. She has had a bad experience here and is the POA.  She is adamant that she does not want us to do anything for the patient here and has refused to have blood work done; antibiotics administered; fluids given etc. Explained that we will have to stablize pt prior to transfer because he is hypotensive. She finally agreed to allow us to do some things. 8:12 PM  Wife was advised that since she is initiating the transfer; she will have to pay $1,900 dollar ambulance fee. Wife later called and states that pt can stay here because she has no money to pay for the transfer. Case discsussed with NP Wai Schmidt; who kindly agreed to admit pt. Capillary refill 2 sec; alert and in NAD     Provider Notes (Medical Decision Making): Urosepsis        Consultations:       Consultations:   7:19 PM  Case discussed with Dr Arnoldo Patel at [facility] who accepted pt to the ER but states that wife should sign AMA form because wife is the one refusing care here and wants him transferred. Procedures and Critical Care     8:23 PM  I have spent 45 minutes of critical care time involved in lab review, consultations with specialist, family decision-making, and documentation. During this entire length of time I was immediately available to the patient. Critical Care: The reason for providing this level of medical care for this critically ill patient was due a critical illness that impaired one or more vital organ systems such that there was a high probability of imminent or life threatening deterioration in the patients condition. This care involved high complexity decision making to assess, manipulate, and support vital system functions, to treat this degreee vital organ system failure and to prevent further life threatening deterioration of the patients condition. Disposition     Disposition:   Admission    Remove if not discharged  DISCHARGE PLAN:  1.    Current Discharge Medication List      CONTINUE these medications which have NOT CHANGED    Details   LORazepam (INTENSOL) 2 mg/mL concentrated solution Take 0.5 mL by mouth every four (4) hours as needed for Agitation, Anxiety or Restlessness. Max Daily Amount: 6 mg. Qty: 5 mL, Refills: 0    Associated Diagnoses: Failure to thrive (0-17)      cyanocobalamin (Vitamin B-12) 100 mcg tablet Take 100 mcg by mouth daily. cholecalciferol (Vitamin D3) 25 mcg (1,000 unit) cap Take 1,000 Units by mouth daily. simvastatin (ZOCOR) 20 mg tablet Take 1 Tablet by mouth nightly. Qty: 30 Tablet, Refills: 1      losartan (COZAAR) 100 mg tablet Take 1 Tablet by mouth daily. Qty: 30 Tablet, Refills: 1      apixaban (ELIQUIS) 2.5 mg tablet Take 1 Tablet by mouth every twelve (12) hours. Qty: 60 Tablet, Refills: 0           2. Follow-up Information    None       3. Return to ED if worse   4. Current Discharge Medication List          Diagnosis     Clinical Impression:   1. Septic shock ([facility] [address]    2. Acute kidney injury [facility])        Attestations:    Flory Stevenson MD    Please note that this dictation was completed with [employer], the [employer] voice recognition software. Quite often unanticipated grammatical, syntax, homophones, and other interpretive errors are inadvertently transcribed by the computer software. Please disregard these errors. Please excuse any errors that have escaped final proofreading. Thank you.

## 2021-09-29 NOTE — ED NOTES
This nurse went into pt's room to place a 16fr coude james catheter as the 18fr was unable to be advanced into the bladder. Pt's wife in room at this time, refusing to allow nurse to place james or perform any other care. Wife stating that she is upset and wanted EMS to take the pt to [facility] and not here, this nurse attempted to explain to the pt's wife that EMS has protocols to follow regarding where the pt is transported to, the pt's wife did not like this and continued to state to this nurse that she still wants him at [facility], that we need to put him in a wheelchair and let her transport her in her [employer]. This nurse attempted to explain that would be unsafe, pt's wife stating she is POA and will not sign any paper work and wants him to be sent [facility] immediately. This nurse informed Dr Belia Lew, Dr Belia Lew in pt room to speak with pt's wife.

## 2021-09-30 ENCOUNTER — APPOINTMENT (OUTPATIENT)
Dept: NON INVASIVE DIAGNOSTICS | Age: 81
DRG: 871 | End: 2021-09-30
Attending: NURSE PRACTITIONER
Payer: MEDICARE

## 2021-09-30 PROBLEM — R62.7 FAILURE TO THRIVE IN ADULT: Status: ACTIVE | Noted: 2021-09-30

## 2021-09-30 PROBLEM — E43 SEVERE PROTEIN-CALORIE MALNUTRITION (HCC): Status: ACTIVE | Noted: 2021-09-30

## 2021-09-30 LAB
ALBUMIN SERPL-MCNC: 1.9 G/DL (ref 3.5–4.7)
ALBUMIN/GLOB SERPL: 0.7 {RATIO}
ALP SERPL-CCNC: 69 U/L (ref 38–126)
ALT SERPL-CCNC: 20 U/L (ref 3–72)
ANION GAP SERPL CALC-SCNC: 11 MMOL/L
AST SERPL W P-5'-P-CCNC: 39 U/L (ref 17–74)
ATRIAL RATE: 102 BPM
ATRIAL RATE: 58 BPM
BASOPHILS # BLD: 0 K/UL (ref 0–0.1)
BASOPHILS NFR BLD: 0 % (ref 0–2)
BILIRUB SERPL-MCNC: 0.5 MG/DL (ref 0.2–1)
BUN SERPL-MCNC: 70 MG/DL (ref 9–21)
BUN/CREAT SERPL: 22
C DIFF GDH STL QL: POSITIVE
C DIFF TOX A+B STL QL IA: POSITIVE
CA-I BLD-MCNC: 7 MG/DL (ref 8.5–10.5)
CALCULATED P AXIS, ECG09: -33 DEGREES
CALCULATED P AXIS, ECG09: -79 DEGREES
CALCULATED R AXIS, ECG10: -42 DEGREES
CALCULATED R AXIS, ECG10: -42 DEGREES
CALCULATED T AXIS, ECG11: 12 DEGREES
CALCULATED T AXIS, ECG11: 6 DEGREES
CHLORIDE SERPL-SCNC: 109 MMOL/L (ref 94–111)
CO2 SERPL-SCNC: 19 MMOL/L (ref 21–33)
CREAT SERPL-MCNC: 3.2 MG/DL (ref 0.8–1.5)
DIAGNOSIS, 93000: NORMAL
DIAGNOSIS, 93000: NORMAL
DIFFERENTIAL METHOD BLD: ABNORMAL
ECHO AO ASC DIAM: 3.8 CM
ECHO AO ROOT DIAM: 3.8 CM
ECHO AV CUSP MM: 2 CM
ECHO EST RA PRESSURE: 3 MMHG
ECHO LA MAJOR AXIS: 4.4 CM
ECHO LA MINOR AXIS: 2.63 CM
ECHO LA TO AORTIC ROOT RATIO: 1.16
ECHO LV EDV A2C: 74.1 CM3
ECHO LV EJECTION FRACTION BIPLANE: 62.3 % (ref 55–100)
ECHO LV ESV A2C: 22 CM3
ECHO LV INTERNAL DIMENSION DIASTOLIC: 4.2 CM (ref 4.2–5.9)
ECHO LV INTERNAL DIMENSION SYSTOLIC: 2.8 CM
ECHO LV IVSD: 1.2 CM (ref 0.6–1)
ECHO LV MASS 2D: 157.1 G (ref 88–224)
ECHO LV MASS INDEX 2D: 94 G/M2 (ref 49–115)
ECHO LV POSTERIOR WALL DIASTOLIC: 1 CM (ref 0.6–1)
ECHO LVOT DIAM: 2.1 CM
ECHO RIGHT VENTRICULAR SYSTOLIC PRESSURE (RVSP): 44 MMHG
ECHO RV INTERNAL DIMENSION: 3.9 CM
ECHO TV MEAN GRADIENT: 41 MMHG
ECHO TV REGURGITANT MAX VELOCITY: 319 CM/S
EOSINOPHIL # BLD: 0 K/UL (ref 0–0.4)
EOSINOPHIL NFR BLD: 0 % (ref 0–5)
ERYTHROCYTE [DISTWIDTH] IN BLOOD BY AUTOMATED COUNT: 14.5 % (ref 11.6–14.5)
GLOBULIN SER CALC-MCNC: 2.9 G/DL
GLUCOSE SERPL-MCNC: 85 MG/DL (ref 70–110)
HCT VFR BLD AUTO: 28.3 % (ref 36–48)
HGB BLD-MCNC: 9.8 G/DL (ref 13–16)
IMM GRANULOCYTES # BLD AUTO: 0 K/UL
IMM GRANULOCYTES NFR BLD AUTO: 0 %
INTERPRETATION: ABNORMAL
LACTATE SERPL-SCNC: 0.8 MMOL/L (ref 0.5–2)
LYMPHOCYTES # BLD: 1.5 K/UL (ref 0.9–3.6)
LYMPHOCYTES NFR BLD: 4 % (ref 21–52)
MAGNESIUM SERPL-MCNC: 1.6 MG/DL (ref 1.7–2.8)
MCH RBC QN AUTO: 29.5 PG (ref 24–34)
MCHC RBC AUTO-ENTMCNC: 34.6 G/DL (ref 31–37)
MCV RBC AUTO: 85.2 FL (ref 78–100)
METAMYELOCYTES NFR BLD MANUAL: 2 %
MONOCYTES # BLD: 0.8 K/UL (ref 0.05–1.2)
MONOCYTES NFR BLD: 2 % (ref 3–10)
NEUTS BAND NFR BLD MANUAL: 6 % (ref 0–5)
NEUTS SEG # BLD: 34.5 K/UL (ref 1.8–8)
NEUTS SEG NFR BLD: 86 % (ref 40–73)
NRBC # BLD: 0 K/UL (ref 0–0.01)
NRBC BLD-RTO: 0 PER 100 WBC
P-R INTERVAL, ECG05: 157 MS
P-R INTERVAL, ECG05: 204 MS
PLATELET # BLD AUTO: 467 K/UL (ref 135–420)
PLATELET COMMENTS,PCOM: ABNORMAL
PMV BLD AUTO: 11.9 FL (ref 9.2–11.8)
POTASSIUM SERPL-SCNC: 3.7 MMOL/L (ref 3.2–5.1)
PROT SERPL-MCNC: 4.8 G/DL (ref 6.1–8.4)
Q-T INTERVAL, ECG07: 482 MS
Q-T INTERVAL, ECG07: 498 MS
QRS DURATION, ECG06: 161 MS
QRS DURATION, ECG06: 162 MS
QTC CALCULATION (BEZET), ECG08: 474 MS
QTC CALCULATION (BEZET), ECG08: 490 MS
RBC # BLD AUTO: 3.32 M/UL (ref 4.35–5.65)
RBC MORPH BLD: ABNORMAL
SODIUM SERPL-SCNC: 139 MMOL/L (ref 135–145)
TROPONIN I SERPL-MCNC: 0.47 NG/ML (ref 0.02–0.05)
TROPONIN I SERPL-MCNC: 0.51 NG/ML (ref 0.02–0.05)
VENTRICULAR RATE, ECG03: 58 BPM
VENTRICULAR RATE, ECG03: 58 BPM
WBC # BLD AUTO: 37.5 K/UL (ref 4.6–13.2)

## 2021-09-30 PROCEDURE — 65270000029 HC RM PRIVATE

## 2021-09-30 PROCEDURE — 80053 COMPREHEN METABOLIC PANEL: CPT

## 2021-09-30 PROCEDURE — 74011250637 HC RX REV CODE- 250/637: Performed by: NURSE PRACTITIONER

## 2021-09-30 PROCEDURE — 74011250636 HC RX REV CODE- 250/636: Performed by: NURSE PRACTITIONER

## 2021-09-30 PROCEDURE — 83605 ASSAY OF LACTIC ACID: CPT

## 2021-09-30 PROCEDURE — 36415 COLL VENOUS BLD VENIPUNCTURE: CPT

## 2021-09-30 PROCEDURE — 83735 ASSAY OF MAGNESIUM: CPT

## 2021-09-30 PROCEDURE — 85025 COMPLETE CBC W/AUTO DIFF WBC: CPT

## 2021-09-30 PROCEDURE — 93005 ELECTROCARDIOGRAM TRACING: CPT

## 2021-09-30 PROCEDURE — 87324 CLOSTRIDIUM AG IA: CPT

## 2021-09-30 PROCEDURE — 93321 DOPPLER ECHO F-UP/LMTD STD: CPT

## 2021-09-30 PROCEDURE — 84484 ASSAY OF TROPONIN QUANT: CPT

## 2021-09-30 PROCEDURE — 74011250636 HC RX REV CODE- 250/636: Performed by: INTERNAL MEDICINE

## 2021-09-30 PROCEDURE — 74011250637 HC RX REV CODE- 250/637: Performed by: INTERNAL MEDICINE

## 2021-09-30 PROCEDURE — 97161 PT EVAL LOW COMPLEX 20 MIN: CPT

## 2021-09-30 PROCEDURE — 74011000258 HC RX REV CODE- 258: Performed by: INTERNAL MEDICINE

## 2021-09-30 PROCEDURE — 87177 OVA AND PARASITES SMEARS: CPT

## 2021-09-30 RX ORDER — MELATONIN
1000 DAILY
Status: DISCONTINUED | OUTPATIENT
Start: 2021-10-01 | End: 2021-10-07 | Stop reason: HOSPADM

## 2021-09-30 RX ORDER — LANOLIN ALCOHOL/MO/W.PET/CERES
250 CREAM (GRAM) TOPICAL DAILY
Status: DISCONTINUED | OUTPATIENT
Start: 2021-10-01 | End: 2021-10-07 | Stop reason: HOSPADM

## 2021-09-30 RX ORDER — LOPERAMIDE HYDROCHLORIDE 2 MG/1
2 CAPSULE ORAL
Status: DISCONTINUED | OUTPATIENT
Start: 2021-09-30 | End: 2021-10-07

## 2021-09-30 RX ORDER — LEVOFLOXACIN 5 MG/ML
500 INJECTION, SOLUTION INTRAVENOUS
Status: DISCONTINUED | OUTPATIENT
Start: 2021-10-01 | End: 2021-09-30

## 2021-09-30 RX ORDER — MAGNESIUM SULFATE 1 G/100ML
1 INJECTION INTRAVENOUS
Status: COMPLETED | OUTPATIENT
Start: 2021-09-30 | End: 2021-09-30

## 2021-09-30 RX ORDER — VANCOMYCIN HYDROCHLORIDE 250 MG/5ML
250 POWDER, FOR SOLUTION ORAL EVERY 6 HOURS
Status: DISCONTINUED | OUTPATIENT
Start: 2021-09-30 | End: 2021-10-07 | Stop reason: HOSPADM

## 2021-09-30 RX ORDER — ASPIRIN 81 MG/1
81 TABLET ORAL DAILY
Status: DISCONTINUED | OUTPATIENT
Start: 2021-09-30 | End: 2021-10-07 | Stop reason: HOSPADM

## 2021-09-30 RX ORDER — CHLORTHALIDONE 50 MG/1
50 TABLET ORAL DAILY
COMMUNITY
End: 2021-10-07

## 2021-09-30 RX ORDER — ATORVASTATIN CALCIUM 10 MG/1
10 TABLET, FILM COATED ORAL
Status: DISCONTINUED | OUTPATIENT
Start: 2021-09-30 | End: 2021-10-07 | Stop reason: HOSPADM

## 2021-09-30 RX ORDER — AMLODIPINE BESYLATE 10 MG/1
10 TABLET ORAL DAILY
COMMUNITY

## 2021-09-30 RX ORDER — SODIUM CHLORIDE AND POTASSIUM CHLORIDE .9; .15 G/100ML; G/100ML
SOLUTION INTRAVENOUS CONTINUOUS
Status: DISCONTINUED | OUTPATIENT
Start: 2021-09-30 | End: 2021-10-01

## 2021-09-30 RX ORDER — HYDRALAZINE HYDROCHLORIDE 50 MG/1
50 TABLET, FILM COATED ORAL 3 TIMES DAILY
COMMUNITY

## 2021-09-30 RX ADMIN — POTASSIUM CHLORIDE AND SODIUM CHLORIDE: 900; 150 INJECTION, SOLUTION INTRAVENOUS at 22:27

## 2021-09-30 RX ADMIN — HEPARIN SODIUM 5000 UNITS: 5000 INJECTION INTRAVENOUS; SUBCUTANEOUS at 05:56

## 2021-09-30 RX ADMIN — MAGNESIUM SULFATE HEPTAHYDRATE 1 G: 1 INJECTION, SOLUTION INTRAVENOUS at 13:00

## 2021-09-30 RX ADMIN — ATORVASTATIN CALCIUM 10 MG: 10 TABLET, FILM COATED ORAL at 21:19

## 2021-09-30 RX ADMIN — Medication 10 ML: at 21:19

## 2021-09-30 RX ADMIN — VANCOMYCIN HYDROCHLORIDE 250 MG: KIT at 14:50

## 2021-09-30 RX ADMIN — VANCOMYCIN HYDROCHLORIDE 250 MG: KIT at 23:50

## 2021-09-30 RX ADMIN — SODIUM CHLORIDE 125 ML/HR: 9 INJECTION, SOLUTION INTRAVENOUS at 07:37

## 2021-09-30 RX ADMIN — CEFEPIME HYDROCHLORIDE 2 G: 2 INJECTION, POWDER, FOR SOLUTION INTRAVENOUS at 01:49

## 2021-09-30 RX ADMIN — Medication 10 ML: at 15:50

## 2021-09-30 RX ADMIN — ASPIRIN 81 MG: 81 TABLET, COATED ORAL at 14:50

## 2021-09-30 RX ADMIN — POTASSIUM CHLORIDE AND SODIUM CHLORIDE: 900; 150 INJECTION, SOLUTION INTRAVENOUS at 12:18

## 2021-09-30 RX ADMIN — MAGNESIUM SULFATE HEPTAHYDRATE 1 G: 1 INJECTION, SOLUTION INTRAVENOUS at 12:18

## 2021-09-30 RX ADMIN — LOPERAMIDE HYDROCHLORIDE 2 MG: 2 CAPSULE ORAL at 12:18

## 2021-09-30 NOTE — PROGRESS NOTES
Reason for Admission: Chart reviewed and noted patient was brought to the ER via EMS for bleeding around james insertion site. Pt was also hypotensive upon arrival to the ER. Pt's white blood count is significantly high at 49.2 with 4% bands. Pt is considered septic at this time, no fever was reported. He was not tachycardic. Given his significant UTI and leukocytosis he will be treated for sepsis. 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 No                   Plan for utilizing home health: TBD     Current Advanced Directive/Advance Care Plan:  DNR      Healthcare Decision Maker: Sheyla West  Click here to complete [address] including selection of the Healthcare Decision Maker Relationship (ie \"Primary\")              Transition of Care Plan:  TBD    Case Management Interventions  PCP Verified by CM: Yes  Transition of Care Consult (CM Consult):  Discharge Planning  Current Support Network: Wife- Sheyla West- [phone]. Patient lives at home with his wife. He has a W/C and a standard walker. [facility] [place] [facility] Pt's wife states her  will be going back home with [facility] at discharge.

## 2021-09-30 NOTE — PROGRESS NOTES
Problem: Urinary Tract Infection - Adult  Goal: *Absence of infection signs and symptoms  Outcome: Progressing Towards Goal     Problem: Patient Education: Go to Patient Education Activity  Goal: Patient/Family Education  Outcome: Progressing Towards Goal

## 2021-09-30 NOTE — ASSESSMENT & PLAN NOTE
Troponin elevated suspected from VLADIMIR  We will repeat in the morning  EKG is within normal limits no chest pain was reported  ACS or NSTEMI not suspected at this time

## 2021-09-30 NOTE — PROGRESS NOTES
2245   Received care of pt via stretcher from ED to room 244. Pt is alert and oriented to person and place. Routine assessment and vs obtained. Pt has several wounds to shins and heel with dressings in place. Pictures taken and documented in chart. Miles patent and draining dawit urine with sediment. Pt denies any pain or discomfort. Call bell in reach and bed in lowest position. 0100   Pt sleeping with no distress noted. 0300  Pt changed of small loose foul smelling BM.    0445   Pt had another loose foul smelling stool and C. Mauricio Moncada NP made aware and order received to obtain stool culture for C Diff. And to place pt under enteric precautions    0645  Sleeping with no distress noted.

## 2021-09-30 NOTE — ROUTINE PROCESS
TRANSFER - OUT REPORT:    Verbal report given to Kalina (name) on Isaak Galeano  being transferred to [address] [place] [address] ([facility]) for routine progression of care       Report consisted of patients Situation, Background, Assessment and   Recommendations(SBAR). Information from the following report(s) SBAR, ED Summary and MAR was reviewed with the receiving nurse. Lines:   Peripheral IV 09/29/21 Anterior; Left Forearm (Active)   Site Assessment Clean, dry, & intact 09/29/21 1642   Phlebitis Assessment 0 09/29/21 1642   Infiltration Assessment 0 09/29/21 1642   Dressing Status Clean, dry, & intact 09/29/21 1642   Dressing Type Transparent 09/29/21 1642   Alcohol Cap Used No 09/29/21 1642       Peripheral IV 09/29/21 Left Antecubital (Active)   Site Assessment Clean, dry, & intact 09/29/21 1929   Phlebitis Assessment 0 09/29/21 1929   Infiltration Assessment 0 09/29/21 1929   Dressing Status Clean, dry, & intact 09/29/21 1929   Dressing Type Transparent 09/29/21 1929   Hub Color/Line Status Patent; Flushed 09/29/21 1929   Action Taken Blood drawn 09/29/21 1929        Opportunity for questions and clarification was provided.       Patient transported with:   Monitor  Tech

## 2021-09-30 NOTE — ASSESSMENT & PLAN NOTE
Acute kidney injury related to sepsis dehydration  IV fluids given in ED  Normal saline will be continued continuous for rehydration  Repeat labs in the morning  Nephrology consultation

## 2021-09-30 NOTE — PROGRESS NOTES
0700- assumed care of patient  0810- Full body assessment complete. Pt is alert with periods of confusion. He is pleasant. Lungs clear bilaterally, james cath noted with yellow straw urine in bag. Small amount of discharge noted around tip of penis. Pt has healing blisters to left inner ankle and skin around the blisters is red/warm. Pressure injury noted to right heel, scabs noted to right and left knee, patient has multiple scratches on his buttocks and non blanchable area to mid glut/buttocks. Pt states his \"backside is really painful\" however, he is having diarrhea. No distress noted. IVF infusing. CBWR  1000- Pt wife at bedside and is short with staff. She stated to the patient \"dont worry honey, I am writing everything down and I am getting your records to take to a \" Pt wife asked this writer why EMS was called and why they took him to this hospital. Pt wife aware that EMS came because she called them for help and they take patients to the nearest hospital. Lab notified nursing that pt is positive for c-diff. MD aware. Pt repositioned to left side with pillow but as soon as he is turned he attempts to turn back. Pt educated on why we want him turned and to avoid further skin breakdown. He verbalized understanding. 1135- full bath given, brief changed, large amount of loose/watery stool noted in brief.

## 2021-09-30 NOTE — PROGRESS NOTES
Per chart, pt is on hospice in home and plans to return to hospice upon d/c. No OT services warranted at present.  Thank You

## 2021-09-30 NOTE — CONSULTS
NAME:  Margarita Parsons   :   1940   MRN:   [id number]     ATTENDING: Wayne Danielson MD  PCP:  Royal Terrie MD    Date/Time:  2021 10:40 AM      Subjective:   REQUESTING PHYSICIAN: Wayne Danielson MD  REASON FOR CONSULT:   VLADIMIR     Patient is an 80-year-old white male with a past medical history of hypertension, type 2 diabetes, baseline dementia presented to the emergency room with complaints of bleeding at the site of indwelling Miles. Initial labs showed a high creatinine of 4.6 and a nephrology consultation was requested for further evaluation and management. Patient seen at bedside, he is alert awake, he appears dehydrated, answering simple questions but has baseline dementia and poor historian. He is not aware of any of his medical conditions. Patient was started on IV fluids and repeat creatinine showed improvement to 3.2 this morning. Patient has borderline hypotension with blood pressure 96/47. Patient had complaints of diarrhea as noted with nursing staff, and he was positive for C. difficile colitis.      Past Medical History:   Diagnosis Date    Abnormality of gait     Benign neoplasm of colon     Depression     DM (diabetes mellitus) ([facility] [address]     Headache     Hypertension     Nonspecific (abnormal) findings on radiological and other examination of genitourinary organs     Other and unspecified hyperlipidemia     Personal history of fall     Right bundle branch block and left anterior fascicular block       Past Surgical History:   Procedure Laterality Date    COLONOSCOPY N/A 3/5/2018    COLONOSCOPY w/ biopsies w/ polypectomy performed by Vero Cox MD at [facility] ENDOSCOPY    HX CATARACT REMOVAL      HX CHOLECYSTECTOMY      HX COLONOSCOPY  3/12/2015    HX TONSILLECTOMY       Social History     Tobacco Use    Smoking status: Former Smoker     Types: Cigarettes    Smokeless tobacco: Never Used    Tobacco comment: Quit 40 years ago    Substance Use Topics    Alcohol use: Not Currently     Alcohol/week: 0.0 standard drinks      Family History   Problem Relation Age of Onset    Diabetes Mother     Alzheimer Mother     Cancer Father         Lung Cancer       Allergies   Allergen Reactions    Oxycontin [Oxycodone] Other (comments)     hallucinations    Vicodin [Hydrocodone-Acetaminophen] Other (comments)      Prior to Admission medications    Medication Sig Start Date End Date Taking? Authorizing Provider   losartan (COZAAR) 100 mg tablet Take 1 Tablet by mouth daily. 8/19/21  Yes Alyson Rosales MD   morphine, 10 mg/5 mL, 10 mg/5 mL oral solution Take 0.25 mL by mouth every  one (1) hour. 9/13/21   Other, MD Dedra   LORazepam (INTENSOL) 2 mg/mL concentrated solution Take 0.5 mL by mouth every four (4) hours as needed for Agitation, Anxiety or Restlessness. Max Daily Amount: 6 mg. 9/4/21   Sivan Sorensen MD   cyanocobalamin (Vitamin B-12) 100 mcg tablet Take 100 mcg by mouth daily. Provider, Historical   cholecalciferol (Vitamin D3) 25 mcg (1,000 unit) cap Take 1,000 Units by mouth daily. Provider, Historical   simvastatin (ZOCOR) 20 mg tablet Take 1 Tablet by mouth nightly. 8/19/21   Alyson Rosales MD   apixaban (ELIQUIS) 2.5 mg tablet Take 1 Tablet by mouth every twelve (12) hours.   Patient not taking: Reported on 9/29/2021 8/19/21   Alyson Rosales MD     Current Facility-Administered Medications   Medication Dose Route Frequency    loperamide (IMODIUM) capsule 2 mg  2 mg Oral Q4H PRN    vancomycin (FIRVANQ) 50 mg/mL oral solution 250 mg  250 mg Oral Q6H    sodium chloride (NS) flush 5-10 mL  5-10 mL IntraVENous PRN    sodium chloride (NS) flush 5-40 mL  5-40 mL IntraVENous Q8H    sodium chloride (NS) flush 5-40 mL  5-40 mL IntraVENous PRN    acetaminophen (TYLENOL) tablet 650 mg  650 mg Oral Q6H PRN    Or    acetaminophen (TYLENOL) suppository 650 mg  650 mg Rectal Q6H PRN    polyethylene glycol (MIRALAX) packet 17 g  17 g Oral DAILY PRN    ondansetron (ZOFRAN ODT) tablet 4 mg  4 mg Oral Q8H PRN    Or    ondansetron (ZOFRAN) injection 4 mg  4 mg IntraVENous Q6H PRN    0.9% sodium chloride infusion  125 mL/hr IntraVENous CONTINUOUS    cholecalciferol (VITAMIN D3) capsule 1,000 Units  1,000 Units Oral DAILY    cyanocobalamin (VITAMIN B12) tablet 100 mcg  100 mcg Oral DAILY    [Held by provider] losartan (COZAAR) tablet 100 mg  100 mg Oral DAILY    simvastatin (ZOCOR) tablet 20 mg  20 mg Oral QHS    LORazepam (ATIVAN) injection 1 mg  1 mg IntraVENous Q6H PRN       REVIEW OF SYSTEMS:     Unable to review in detail as pt is a poor historian    Objective:   VITALS:    Visit Vitals  BP (!) 96/47   Pulse 60   Temp 97 °F (36.1 °C)   Resp 18   Ht 5' 6\" (1.676 m)   Wt 59 kg (130 lb)   SpO2 98%   BMI 20.98 kg/m²     Temp (24hrs), Av.5 °F (36.4 °C), Min:96.9 °F (36.1 °C), Max:98.2 °F (36.8 °C)      PHYSICAL EXAM:   General: alert awake , no acute distress. Confusion+  HEENT: EOMI, no Icterus, no Pallor, pupils reactive, mucosa dry, normal inspection of ears and nose,   Neck: Neck is supple, No JVD, no thyromegaly,  Lungs: breathsounds normal, no respiratory distress on inspection, no rhonchi, no rales,  CVS: heart sounds normal,  no murmurs, no rubs. GI: soft, nontender, normal BS, no palpable organomegaly, no renal angle tenderness.   Extremeties: no clubbing, no cyanosis, no edema,  Neuro: Alert, awake, answering simple questions but confused and disoriented , moving all extremeties   Skin: Decreased skin turgor, no skin rashes   Musculoskeletal: no acute joint swellings      LAB DATA REVIEWED:    Recent Results (from the past 24 hour(s))   CBC WITH AUTOMATED DIFF    Collection Time: 21  4:50 PM   Result Value Ref Range    WBC 49.2 (H) 4.6 - 13.2 K/uL    RBC 3.46 (L) 4.35 - 5.65 M/uL    HGB 10.4 (L) 13.0 - 16.0 g/dL    HCT 29.3 (L) 36.0 - 48.0 %    MCV 84.7 78.0 - 100.0 FL    MCH 30.1 24.0 - 34.0 PG    MCHC 35.5 31.0 - 37.0 g/dL    RDW 14.3 11.6 - 14.5 %    PLATELET 589 (H) 723 - 420 K/uL    MPV 11.9 (H) 9.2 - 11.8 FL    NRBC 0.0 0.0  WBC    ABSOLUTE NRBC 0.00 0.00 - 0.01 K/uL    NEUTROPHILS 84 (H) 40 - 73 %    BAND NEUTROPHILS 3 0 - 5 %    LYMPHOCYTES 10 (L) 21 - 52 %    MONOCYTES 3 3 - 10 %    EOSINOPHILS 0 0 - 5 %    BASOPHILS 0 0 - 2 %    IMMATURE GRANULOCYTES 0 %    ABS. NEUTROPHILS 42.8 (H) 1.8 - 8.0 K/UL    ABS. LYMPHOCYTES 4.9 (H) 0.9 - 3.6 K/UL    ABS. MONOCYTES 1.5 (H) 0.05 - 1.2 K/UL    ABS. EOSINOPHILS 0.0 0.0 - 0.4 K/UL    ABS. BASOPHILS 0.0 0.0 - 0.1 K/UL    ABS. IMM. GRANS. 0.0 K/UL    DF Manual      PLATELET COMMENTS Clumped Platelets      RBC COMMENTS Anisocytosis  1+       METABOLIC PANEL, BASIC    Collection Time: 09/29/21  4:50 PM   Result Value Ref Range    Sodium 136 135 - 145 mmol/L    Potassium 3.7 3.2 - 5.1 mmol/L    Chloride 98 94 - 111 mmol/L    CO2 22 21 - 33 mmol/L    Anion gap 16 mmol/L    Glucose 132 (H) 70 - 110 mg/dL    BUN 77 (H) 9 - 21 mg/dL    Creatinine 4.60 (H) 0.8 - 1.50 mg/dL    BUN/Creatinine ratio 17      GFR est AA 15 ml/min/1.73m2    GFR est non-AA 12 ml/min/1.73m2    Calcium 7.7 (L) 8.5 - 10.5 mg/dL   HEPATIC FUNCTION PANEL    Collection Time: 09/29/21  4:50 PM   Result Value Ref Range    Protein, total 5.6 (L) 6.1 - 8.4 g/dL    Albumin 2.2 (L) 3.5 - 4.7 g/dL    Globulin 3.4 g/dL    A-G Ratio 0.6      Bilirubin, total 0.5 0.2 - 1.0 mg/dL    Bilirubin, direct 0.1 0.0 - 0.3 mg/dL    Alk.  phosphatase 90 38 - 126 U/L    AST (SGOT) 40 17 - 74 U/L    ALT (SGPT) 19 3 - 72 U/L   LACTIC ACID    Collection Time: 09/29/21  4:50 PM   Result Value Ref Range    Lactic acid 1.4 0.5 - 2.0 mmol/L   PROCALCITONIN    Collection Time: 09/29/21  4:50 PM   Result Value Ref Range    Procalcitonin 7.30 (HH) 0.5 - 2.0 ng/mL   TROPONIN I    Collection Time: 09/29/21  4:50 PM   Result Value Ref Range    Troponin-I, Qt. 0.65 (H) 0.02 - 0.05 ng/mL   CULTURE, BLOOD    Collection Time: 09/29/21  7:20 PM    Specimen: Blood   Result Value Ref Range    Special Requests: No Special Requests      Culture result: No growth after 11 hours     EKG, 12 LEAD, INITIAL    Collection Time: 09/29/21  7:20 PM   Result Value Ref Range    Ventricular Rate 58 BPM    Atrial Rate 102 BPM    P-R Interval 204 ms    QRS Duration 162 ms    Q-T Interval 482 ms    QTC Calculation (Bezet) 474 ms    Calculated P Axis -33 degrees    Calculated R Axis -42 degrees    Calculated T Axis 12 degrees    Diagnosis       Sinus rhythm  Multiple premature complexes, vent & supraven  RBBB and LAFB  Left ventricular hypertrophy     URINALYSIS W/ RFLX MICROSCOPIC    Collection Time: 09/29/21  7:24 PM   Result Value Ref Range    Color Dark Yellow      Appearance Turbid      Specific gravity 1.020 1.005 - 1.030      pH (UA) 5.0 5.0 - 8.0      Protein 100 (A) Negative mg/dL    Glucose Negative Negative mg/dL    Ketone Negative Negative mg/dL    Bilirubin Small (A) Negative      Blood Large (A) Negative      Urobilinogen 1.0 0.2 - 1.0 EU/dL    Nitrites Negative Negative      Leukocyte Esterase Small (A) Negative     URINE MICROSCOPIC    Collection Time: 09/29/21  7:24 PM   Result Value Ref Range    WBC 5-10 0 - 4 /hpf    RBC 20-50 0 - 2 /hpf    Epithelial cells Few 0 - 20 /lpf    Bacteria 3+ (A) None /hpf    Mixed Cell Cast 5-10 /lpf   CULTURE, BLOOD    Collection Time: 09/29/21  7:31 PM    Specimen: Blood   Result Value Ref Range    Special Requests: No Special Requests      Culture result: No growth after 11 hours     TROPONIN I    Collection Time: 09/29/21  9:21 PM   Result Value Ref Range    Troponin-I, Qt. 0.35 (H) 0.02 - 3.42 ng/mL   METABOLIC PANEL, COMPREHENSIVE    Collection Time: 09/30/21  4:27 AM   Result Value Ref Range    Sodium 139 135 - 145 mmol/L    Potassium 3.7 3.2 - 5.1 mmol/L    Chloride 109 94 - 111 mmol/L    CO2 19 (L) 21 - 33 mmol/L    Anion gap 11 mmol/L    Glucose 85 70 - 110 mg/dL    BUN 70 (H) 9 - 21 mg/dL    Creatinine 3.20 (H) 0.8 - 1.50 mg/dL    BUN/Creatinine ratio 22      GFR est AA 23 ml/min/1.73m2    GFR est non-AA 19 ml/min/1.73m2    Calcium 7.0 (L) 8.5 - 10.5 mg/dL    Bilirubin, total 0.5 0.2 - 1.0 mg/dL    AST (SGOT) 39 17 - 74 U/L    ALT (SGPT) 20 3 - 72 U/L    Alk. phosphatase 69 38 - 126 U/L    Protein, total 4.8 (L) 6.1 - 8.4 g/dL    Albumin 1.9 (L) 3.5 - 4.7 g/dL    Globulin 2.9 g/dL    A-G Ratio 0.7     MAGNESIUM    Collection Time: 09/30/21  4:27 AM   Result Value Ref Range    Magnesium 1.6 (L) 1.7 - 2.8 mg/dL   LACTIC ACID    Collection Time: 09/30/21  4:27 AM   Result Value Ref Range    Lactic acid 0.8 0.5 - 2.0 mmol/L   CBC WITH AUTOMATED DIFF    Collection Time: 09/30/21  4:27 AM   Result Value Ref Range    WBC 37.5 (H) 4.6 - 13.2 K/uL    RBC 3.32 (L) 4.35 - 5.65 M/uL    HGB 9.8 (L) 13.0 - 16.0 g/dL    HCT 28.3 (L) 36.0 - 48.0 %    MCV 85.2 78.0 - 100.0 FL    MCH 29.5 24.0 - 34.0 PG    MCHC 34.6 31.0 - 37.0 g/dL    RDW 14.5 11.6 - 14.5 %    PLATELET 064 (H) 418 - 420 K/uL    MPV 11.9 (H) 9.2 - 11.8 FL    NRBC 0.0 0.0  WBC    ABSOLUTE NRBC 0.00 0.00 - 0.01 K/uL    NEUTROPHILS 86 (H) 40 - 73 %    BAND NEUTROPHILS 6 (H) 0 - 5 %    LYMPHOCYTES 4 (L) 21 - 52 %    MONOCYTES 2 (L) 3 - 10 %    EOSINOPHILS 0 0 - 5 %    BASOPHILS 0 0 - 2 %    METAMYELOCYTES 2 (H) 0 %    IMMATURE GRANULOCYTES 0 %    ABS. NEUTROPHILS 34.5 (H) 1.8 - 8.0 K/UL    ABS. LYMPHOCYTES 1.5 0.9 - 3.6 K/UL    ABS. MONOCYTES 0.8 0.05 - 1.2 K/UL    ABS. EOSINOPHILS 0.0 0.0 - 0.4 K/UL    ABS. BASOPHILS 0.0 0.0 - 0.1 K/UL    ABS. IMM.  GRANS. 0.0 K/UL    DF Manual      PLATELET COMMENTS Large Platelets      RBC COMMENTS [place] cells  1+       TROPONIN I    Collection Time: 09/30/21  4:27 AM   Result Value Ref Range    Troponin-I, Qt. 0.51 (H) 0.02 - 0.05 ng/mL   C. DIFFICILE AG & TOXIN A/B    Collection Time: 09/30/21  4:27 AM   Result Value Ref Range    GDH ANTIGEN Positive (A) Negative      C. difficile toxin Positive (A) Negative      INTERPRETATION Positive for toxigenic C. difficile (A) Negative for toxigenic C. difficile         Assessment and plan   1. Acute Kidney Injury : probably prerenal azotemia secondary to dehydration use of Losartan  discontinued losartan  Started on IVF, improving renal functions noted with creatinine down from 4.6-3.2  Continue IV fluids and continue to monitor renal function   no history of any chronic kidney disease    2. Bleeding at the site of Miles catheter  Resolved now, stable hemoglobin    3. Chronic Miles catheter drainage: Probably had urinary retention or incontinence with hypotonic bladder or BPH  Continue Miles drainage and continue to monitor    4. Diarrhea/C. difficile colitis:-Leukocytosis+  Started on p.o. vancomycin    5. Hypertension: Stable  Losartan on hold  Continue to monitor blood pressures    6.   Type 2 diabetes: Stable blood sugars, continue to monitor Accu-Cheks        Signed: Elbert Winchester MD

## 2021-09-30 NOTE — PROGRESS NOTES
Comprehensive Nutrition Assessment    Type and Reason for Visit: Consult    Nutrition Recommendations/Plan: regular diet to encourage po intake if BG is > 150 then recommend change to 4 CHO choice meals  Provide glucerna BID and gelatein 20 with lunch trays    Nutrition Assessment:  [de-identified] yo male PMH: benign neoplasm of colon, depression, DM, HTN, HLD, dementia   underweight BMI 20.9 for age of 74 yo or >. admitted due to sepsis 2/2 UTI and + C-diff. Consulted for failure to thrive pt is thin and bedbound. pressure injury to right heel. Albumin low 2.2  Being treated with IVF and IV ABx for UTI, C-diff starting PO Vancomycin. For additional protein calories trying glucerna BID and gelatein 20 daily but due to dementia PO intake may vary or not improve. Currently on regular diet to encourage PO intake and currently pt BG is well controlled. If BG becomes elevated > 150 then can change to CHO restricted diet. Recent Results (from the past 24 hour(s))   CBC WITH AUTOMATED DIFF    Collection Time: 09/29/21  4:50 PM   Result Value Ref Range    WBC 49.2 (H) 4.6 - 13.2 K/uL    RBC 3.46 (L) 4.35 - 5.65 M/uL    HGB 10.4 (L) 13.0 - 16.0 g/dL    HCT 29.3 (L) 36.0 - 48.0 %    MCV 84.7 78.0 - 100.0 FL    MCH 30.1 24.0 - 34.0 PG    MCHC 35.5 31.0 - 37.0 g/dL    RDW 14.3 11.6 - 14.5 %    PLATELET 613 (H) 069 - 420 K/uL    MPV 11.9 (H) 9.2 - 11.8 FL    NRBC 0.0 0.0  WBC    ABSOLUTE NRBC 0.00 0.00 - 0.01 K/uL    NEUTROPHILS 84 (H) 40 - 73 %    BAND NEUTROPHILS 3 0 - 5 %    LYMPHOCYTES 10 (L) 21 - 52 %    MONOCYTES 3 3 - 10 %    EOSINOPHILS 0 0 - 5 %    BASOPHILS 0 0 - 2 %    IMMATURE GRANULOCYTES 0 %    ABS. NEUTROPHILS 42.8 (H) 1.8 - 8.0 K/UL    ABS. LYMPHOCYTES 4.9 (H) 0.9 - 3.6 K/UL    ABS. MONOCYTES 1.5 (H) 0.05 - 1.2 K/UL    ABS. EOSINOPHILS 0.0 0.0 - 0.4 K/UL    ABS. BASOPHILS 0.0 0.0 - 0.1 K/UL    ABS. IMM.  GRANS. 0.0 K/UL    DF Manual      PLATELET COMMENTS Clumped Platelets      RBC COMMENTS Anisocytosis  1+ METABOLIC PANEL, BASIC    Collection Time: 09/29/21  4:50 PM   Result Value Ref Range    Sodium 136 135 - 145 mmol/L    Potassium 3.7 3.2 - 5.1 mmol/L    Chloride 98 94 - 111 mmol/L    CO2 22 21 - 33 mmol/L    Anion gap 16 mmol/L    Glucose 132 (H) 70 - 110 mg/dL    BUN 77 (H) 9 - 21 mg/dL    Creatinine 4.60 (H) 0.8 - 1.50 mg/dL    BUN/Creatinine ratio 17      GFR est AA 15 ml/min/1.73m2    GFR est non-AA 12 ml/min/1.73m2    Calcium 7.7 (L) 8.5 - 10.5 mg/dL   HEPATIC FUNCTION PANEL    Collection Time: 09/29/21  4:50 PM   Result Value Ref Range    Protein, total 5.6 (L) 6.1 - 8.4 g/dL    Albumin 2.2 (L) 3.5 - 4.7 g/dL    Globulin 3.4 g/dL    A-G Ratio 0.6      Bilirubin, total 0.5 0.2 - 1.0 mg/dL    Bilirubin, direct 0.1 0.0 - 0.3 mg/dL    Alk.  phosphatase 90 38 - 126 U/L    AST (SGOT) 40 17 - 74 U/L    ALT (SGPT) 19 3 - 72 U/L   LACTIC ACID    Collection Time: 09/29/21  4:50 PM   Result Value Ref Range    Lactic acid 1.4 0.5 - 2.0 mmol/L   PROCALCITONIN    Collection Time: 09/29/21  4:50 PM   Result Value Ref Range    Procalcitonin 7.30 (HH) 0.5 - 2.0 ng/mL   TROPONIN I    Collection Time: 09/29/21  4:50 PM   Result Value Ref Range    Troponin-I, Qt. 0.65 (H) 0.02 - 0.05 ng/mL   CULTURE, BLOOD    Collection Time: 09/29/21  7:20 PM    Specimen: Blood   Result Value Ref Range    Special Requests: No Special Requests      Culture result: No growth after 11 hours     EKG, 12 LEAD, INITIAL    Collection Time: 09/29/21  7:20 PM   Result Value Ref Range    Ventricular Rate 58 BPM    Atrial Rate 102 BPM    P-R Interval 204 ms    QRS Duration 162 ms    Q-T Interval 482 ms    QTC Calculation (Bezet) 474 ms    Calculated P Axis -33 degrees    Calculated R Axis -42 degrees    Calculated T Axis 12 degrees    Diagnosis       Sinus rhythm  Multiple premature complexes, vent & supraven  RBBB and LAFB  Left ventricular hypertrophy    Confirmed by OTILIO BARTLETT ([id number]) on 9/30/2021 12:20:46 PM     URINALYSIS W/ RFLX MICROSCOPIC Collection Time: 09/29/21  7:24 PM   Result Value Ref Range    Color Dark Yellow      Appearance Turbid      Specific gravity 1.020 1.005 - 1.030      pH (UA) 5.0 5.0 - 8.0      Protein 100 (A) Negative mg/dL    Glucose Negative Negative mg/dL    Ketone Negative Negative mg/dL    Bilirubin Small (A) Negative      Blood Large (A) Negative      Urobilinogen 1.0 0.2 - 1.0 EU/dL    Nitrites Negative Negative      Leukocyte Esterase Small (A) Negative     URINE MICROSCOPIC    Collection Time: 09/29/21  7:24 PM   Result Value Ref Range    WBC 5-10 0 - 4 /hpf    RBC 20-50 0 - 2 /hpf    Epithelial cells Few 0 - 20 /lpf    Bacteria 3+ (A) None /hpf    Mixed Cell Cast 5-10 /lpf   CULTURE, BLOOD    Collection Time: 09/29/21  7:31 PM    Specimen: Blood   Result Value Ref Range    Special Requests: No Special Requests      Culture result: No growth after 11 hours     TROPONIN I    Collection Time: 09/29/21  9:21 PM   Result Value Ref Range    Troponin-I, Qt. 0.35 (H) 0.02 - 2.96 ng/mL   METABOLIC PANEL, COMPREHENSIVE    Collection Time: 09/30/21  4:27 AM   Result Value Ref Range    Sodium 139 135 - 145 mmol/L    Potassium 3.7 3.2 - 5.1 mmol/L    Chloride 109 94 - 111 mmol/L    CO2 19 (L) 21 - 33 mmol/L    Anion gap 11 mmol/L    Glucose 85 70 - 110 mg/dL    BUN 70 (H) 9 - 21 mg/dL    Creatinine 3.20 (H) 0.8 - 1.50 mg/dL    BUN/Creatinine ratio 22      GFR est AA 23 ml/min/1.73m2    GFR est non-AA 19 ml/min/1.73m2    Calcium 7.0 (L) 8.5 - 10.5 mg/dL    Bilirubin, total 0.5 0.2 - 1.0 mg/dL    AST (SGOT) 39 17 - 74 U/L    ALT (SGPT) 20 3 - 72 U/L    Alk.  phosphatase 69 38 - 126 U/L    Protein, total 4.8 (L) 6.1 - 8.4 g/dL    Albumin 1.9 (L) 3.5 - 4.7 g/dL    Globulin 2.9 g/dL    A-G Ratio 0.7     MAGNESIUM    Collection Time: 09/30/21  4:27 AM   Result Value Ref Range    Magnesium 1.6 (L) 1.7 - 2.8 mg/dL   LACTIC ACID    Collection Time: 09/30/21  4:27 AM   Result Value Ref Range    Lactic acid 0.8 0.5 - 2.0 mmol/L   CBC WITH AUTOMATED DIFF    Collection Time: 09/30/21  4:27 AM   Result Value Ref Range    WBC 37.5 (H) 4.6 - 13.2 K/uL    RBC 3.32 (L) 4.35 - 5.65 M/uL    HGB 9.8 (L) 13.0 - 16.0 g/dL    HCT 28.3 (L) 36.0 - 48.0 %    MCV 85.2 78.0 - 100.0 FL    MCH 29.5 24.0 - 34.0 PG    MCHC 34.6 31.0 - 37.0 g/dL    RDW 14.5 11.6 - 14.5 %    PLATELET 959 (H) 972 - 420 K/uL    MPV 11.9 (H) 9.2 - 11.8 FL    NRBC 0.0 0.0  WBC    ABSOLUTE NRBC 0.00 0.00 - 0.01 K/uL    NEUTROPHILS 86 (H) 40 - 73 %    BAND NEUTROPHILS 6 (H) 0 - 5 %    LYMPHOCYTES 4 (L) 21 - 52 %    MONOCYTES 2 (L) 3 - 10 %    EOSINOPHILS 0 0 - 5 %    BASOPHILS 0 0 - 2 %    METAMYELOCYTES 2 (H) 0 %    IMMATURE GRANULOCYTES 0 %    ABS. NEUTROPHILS 34.5 (H) 1.8 - 8.0 K/UL    ABS. LYMPHOCYTES 1.5 0.9 - 3.6 K/UL    ABS. MONOCYTES 0.8 0.05 - 1.2 K/UL    ABS. EOSINOPHILS 0.0 0.0 - 0.4 K/UL    ABS. BASOPHILS 0.0 0.0 - 0.1 K/UL    ABS. IMM.  GRANS. 0.0 K/UL    DF Manual      PLATELET COMMENTS Large Platelets      RBC COMMENTS [place] cells  1+       TROPONIN I    Collection Time: 09/30/21  4:27 AM   Result Value Ref Range    Troponin-I, Qt. 0.51 (H) 0.02 - 0.05 ng/mL   C. DIFFICILE AG & TOXIN A/B    Collection Time: 09/30/21  4:27 AM   Result Value Ref Range    GDH ANTIGEN Positive (A) Negative      C. difficile toxin Positive (A) Negative      INTERPRETATION Positive for toxigenic C. difficile (A) Negative for toxigenic C. difficile     TROPONIN I    Collection Time: 09/30/21 10:00 AM   Result Value Ref Range    Troponin-I, Qt. 0.47 (H) 0.02 - 0.05 ng/mL   EKG, 12 LEAD, SUBSEQUENT    Collection Time: 09/30/21 12:34 PM   Result Value Ref Range    Ventricular Rate 58 BPM    Atrial Rate 58 BPM    P-R Interval 157 ms    QRS Duration 161 ms    Q-T Interval 498 ms    QTC Calculation (Bezet) 490 ms    Calculated P Axis -79 degrees    Calculated R Axis -42 degrees    Calculated T Axis 6 degrees    Diagnosis       Sinus or ectopic atrial rhythm  RBBB and LAFB  Left ventricular hypertrophy    Confirmed by OTILIO Ortega ([id number]) on 9/30/2021 4:14:12 PM     ECHO ADULT FOLLOW-UP OR LIMITED    Collection Time: 09/30/21  3:28 PM   Result Value Ref Range    AV Cusp 2.00 cm    Ao Root D 3.80 cm    AO ASC D 3.80 cm    IVSd 1.20 (A) 0.60 - 1.00 cm    LVIDd 4.20 4. 20 - 5.90 cm    LVIDs 2.80 cm    LVOT d 2.10 cm    LVPWd 1.00 0.60 - 1.00 cm    LV ED Vol A2C 74.10 cm3    LV ES Vol A2C 22.00 cm3    Left Atrium Major Axis 4.40 cm    Left Atrium to Aortic Root Ratio 1.16     Est. RA Pressure 3.00 mmHg    RVIDd 3.90 cm    RVSP 44.00 mmHg    TR Max Velocity 319.00 cm/s    TV MG 41.00 mmHg    BP EF 62.3 55.0 - 100.0 %    LV Mass .1 88.0 - 224.0 g    LV Mass AL Index 94.0 49.0 - 115.0 g/m2    Left Atrium Minor Axis 2.63 cm       Malnutrition Assessment:  Malnutrition Status:  Severe malnutrition    Context:  Acute illness     Findings of the 6 clinical characteristics of malnutrition:   Energy Intake:  7 - 50% or less of est energy requirements for 5 or more days (currently with C-diff and hx of dementia failure to thrive)  Weight Loss:  Unable to assess     Body Fat Loss:  7 - Moderate body fat loss, Triceps, Orbital, Fat overlying ribs, Buccal region   Muscle Mass Loss:  7 - Moderate muscle mass loss, Temples (temporalis), Thigh (quadriceps), Scapula (trapezius), Hand (interosseous), Clavicles (pectoralis & deltoids), Calf  Fluid Accumulation:  No significant fluid accumulation,     Strength:  Not performed         Estimated Daily Nutrient Needs:  Energy (kcal): 0019-2001 kcal/day; Weight Used for Energy Requirements: Admission (60 kg)  Protein (g): 60-72 g/day; Weight Used for Protein Requirements: Admission (1-1.2 g/kg)  Fluid (ml/day): 1649-0235 mL/day; Method Used for Fluid Requirements: 1 ml/kcal      Nutrition Related Findings:  underweight BMI 20.9 for age of 72 yo or >. admitted due to sepsis 2/2 UTI and + C-diff. Consulted for failure to thrive pt is thin and bedbound.  pressure injury to right heel. Wounds:    Pressure injury (righ heel)       Current Nutrition Therapies:  ADULT DIET Regular    Anthropometric Measures:  · Height:  5' 6\" (167.6 cm)  · Current Body Wt:  59 kg (130 lb)   · Admission Body Wt:  130 lb    · Usual Body Wt:        · Ideal Body Wt:  142 lbs:  91.5 %   · Adjusted Body Weight:   ; Weight Adjustment for: No adjustment   · Adjusted BMI:       · BMI Category:  Underweight (BMI less than 22) age over 72       Nutrition Diagnosis:   · Inadequate protein-energy intake, Altered GI function, Increased nutrient needs related to altered GI function, cognitive or neurological impairment, impaired nutrient utilization, inadequate protein-energy intake, increased demand for energy/nutrients as evidenced by diarrhea, GI abnormality, lab values, poor intake prior to admission      Nutrition Interventions:   Food and/or Nutrient Delivery: Continue current diet, Start oral nutrition supplement  Nutrition Education and Counseling: Education not appropriate  Coordination of Nutrition Care: Continue to monitor while inpatient    Goals:  albumin > 3.5, Pt to eat > 75% of meals and supplements, BM q 1-3 days, BMI 22-25 for adults > 71 yo, prevent skin breakdown       Nutrition Monitoring and Evaluation:   Behavioral-Environmental Outcomes:    Food/Nutrient Intake Outcomes: Food and nutrient intake, Supplement intake, Diet advancement/tolerance  Physical Signs/Symptoms Outcomes: Diarrhea, Weight, Skin, Meal time behavior, Biochemical data     F/U 10/3/2021    Discharge Planning:     Too soon to determine     Electronically signed by Janis Martin on 9/30/2021 at 4:26 PM    Contact: [facility] [phone]

## 2021-09-30 NOTE — PROGRESS NOTES
Problem: Mobility Impaired (Adult and Pediatric)  Goal: *Acute Goals and Plan of Care (Insert Text)  Description: Physical Therapy Goals  Initiated 9/30/2021 and to be accomplished within 7 day(s)  1. Patient will move from supine to sit and sit to supine , scoot up and down, and roll side to side in bed with minimal assistance/contact guard assist.    2.  Patient will transfer from bed to chair and chair to bed with moderate assistance  using the least restrictive device. 3.  Patient will perform sit to stand with moderate assistance . 4. Patient will ambulate with moderate assistance  for 15 feet with the least restrictive device. PLOF: Pt has does not get up much anymore, he was receiving hospice at home and is reported to be bedbound. Wife not present to provide recent functional levels. Outcome: Not Progressing Towards Goal   PHYSICAL THERAPY EVALUATION    Patient: Jennifer Lopez (31 y.o. male)  Date: 9/30/2021  Primary Diagnosis: Septic shock ([address] [A41.9, R65.21]  Acute kidney injury ([address] [N17.9]  Sepsis due to urinary tract infection ([address] [A41.9, N39.0]  VLADIMIR (acute kidney injury) ([address] [N17.9]  Elevated troponin [R77.8]        Precautions:   Fall, Skin    ASSESSMENT :  Based on the objective data described below, the patient presents with UTI and sepsis and he is being checked for C-diff. Therapist tries encourage pt to perform mobility, he states, \"I'm too weak\". Therapist then tries to encourage pt they would help him, but he continues to refuse. He is agreeable to MMT only. Patient would benefit from a trial of P.T. to attempt to improve strength, gait, and transfers. They would likely benefit from return home with hospice, as prior, once medically stable. Patient will benefit from skilled intervention to address the above impairments.   Patient's rehabilitation potential is considered to be Poor   Factors which may influence rehabilitation potential include:   []         None noted  [x]         Mental ability/status  []         Medical condition  []         Home/family situation and support systems  []         Safety awareness  []         Pain tolerance/management  []         Other:      PLAN :  Recommendations and Planned Interventions:   [x]           Bed Mobility Training             []    Neuromuscular Re-Education  [x]           Transfer Training                   []    Orthotic/Prosthetic Training  [x]           Gait Training                          []    Modalities  [x]           Therapeutic Exercises           []    Edema Management/Control  [x]           Therapeutic Activities            []    Family Training/Education  [x]           Patient Education  []           Other (comment):    Frequency/Duration: Patient will be followed by physical therapy 1-2 times per day/4-7 days per week to address goals. Discharge Recommendations: Hospice  Further Equipment Recommendations for Discharge: N/A     SUBJECTIVE:   Patient stated i'm too weak.     OBJECTIVE DATA SUMMARY:     Past Medical History:   Diagnosis Date    Abnormality of gait     Benign neoplasm of colon     Depression     DM (diabetes mellitus) ([facility]     Headache     Hypertension     Nonspecific (abnormal) findings on radiological and other examination of genitourinary organs     Other and unspecified hyperlipidemia     Personal history of fall     Right bundle branch block and left anterior fascicular block      Past Surgical History:   Procedure Laterality Date    COLONOSCOPY N/A 3/5/2018    COLONOSCOPY w/ biopsies w/ polypectomy performed by Renetta Nicholas MD at [facility] ENDOSCOPY    HX CATARACT REMOVAL      HX CHOLECYSTECTOMY      HX COLONOSCOPY  3/12/2015    HX TONSILLECTOMY       Barriers to Learning/Limitations: yes;  cognitive  Compensate with: Visual Cues, Verbal Cues, and Tactile Cues  Home Situation:  Home Situation  Home Environment: Private residence  # Steps to Enter:  (pt not sure)  One/Two Story Residence: [facility] story  Living Alone: No  Support Systems: Spouse/Significant Other, Hospice  Patient Expects to be Discharged to[de-identified] House  Current DME Used/Available at Home: None  Critical Behavior:  Neurologic State: Alert  Orientation Level: Oriented to person  Cognition: Follows commands  Skin Condition/Temp: Dry;Fragile  Skin Integrity: Wound (add Wound LDA)  Skin Integumentary  Skin Color: Appropriate for ethnicity  Skin Condition/Temp: Dry;Fragile  Skin Integrity: Wound (add Wound LDA)  Strength:    Strength: Generally decreased, functional  RUE: 4-/5  LUE: 4-/5  RLE: 3+/5  LLE: 3+/5  Tone & Sensation:   Sensation: Intact  Range Of Motion:   AROM: Within functional limits  Functional Mobility:  Bed Mobility:  Rolling: Other (comment) (Pt refuses mobility, states he is too weak)  Pain:  Pain level pre-treatment: 0/10   Pain level post-treatment: 0/10   Pain Location: N/A  Activity Tolerance:   Poor  Please refer to the flowsheet for vital signs taken during this treatment. After treatment:   []         Patient left in no apparent distress sitting up in chair  [x]         Patient left in no apparent distress in bed  [x]         Call bell left within reach  []         Nursing notified  []         Caregiver present  []         Bed alarm activated  []         SCDs applied    COMMUNICATION/EDUCATION:   [x]         Role of Physical Therapy in the acute care setting. []         Fall prevention education was provided and the patient/caregiver indicated understanding. []         Patient/family have participated as able in goal setting and plan of care. []         Patient/family agree to work toward stated goals and plan of care. [x]         Patient understands intent and goals of therapy, but is neutral about his/her participation. []         Patient is unable to participate in goal setting/plan of care: ongoing with therapy staff.  []         Other:     Thank you for this referral.  Sonam Aguilar, PT, DPT   Time Calculation: 10 mins

## 2021-09-30 NOTE — ASSESSMENT & PLAN NOTE
Patient made DNR status after conversation with wife  Antibiotics and fluid will be continued  Nutritional consult will be placed

## 2021-09-30 NOTE — PROGRESS NOTES
Received care of patient lying in bed watching tv.  No distress noted patient denies any needs CB in reach

## 2021-09-30 NOTE — CONSULTS
[facility] CARDIOLOGY  CARDIOLOGY CONSULTATION    REASON FOR CONSULT: Elevated troponin    REQUESTING PROVIDER: SON Andres NP    CHIEF COMPLAINT: Urethral bleeding    HISTORY OF PRESENT ILLNESS:  Curtis Cardenas is a [de-identified]y.o. year-old male with past medical history significant for HTN, DM, dementia, hyperlipidemia, and syncope who was seen today for evaluation of elevated troponin. Unable to obtain reliable history from the patient due to memory impairment; information supplemented by the medical record. The patient presented to the [facility] ER on 9/29/21 for evaluation of urethral bleeding. On EMS arrival to the home, the patient was hypotensive. In the ER, sepsis treatment was initiated and he received fluid boluses and IV antibiotics. He is being treated for sepsis due to C.diff colitis, VLADIMIR, failure to thrive, and elevated troponins. At the time of the visit, he denies any chest pain or shortness of breath. Records from hospital admission course thus far reviewed. Telemetry reviewed. No events overnight. The patient is in sinus rhythm. INPATIENT MEDICATIONS:  Home medications reviewed.     Current Facility-Administered Medications:     loperamide (IMODIUM) capsule 2 mg, 2 mg, Oral, Q4H PRN, Hang Finch NP    vancomycin (FIRVANQ) 50 mg/mL oral solution 250 mg, 250 mg, Oral, Q6H, Jeanie Cintron MD    sodium chloride (NS) flush 5-10 mL, 5-10 mL, IntraVENous, PRN, Naomie Fernandez MD    sodium chloride (NS) flush 5-40 mL, 5-40 mL, IntraVENous, Q8H, Zhanna Hansen NP, 10 mL at 09/29/21 2330    sodium chloride (NS) flush 5-40 mL, 5-40 mL, IntraVENous, PRN, Hang Finch NP    acetaminophen (TYLENOL) tablet 650 mg, 650 mg, Oral, Q6H PRN **OR** acetaminophen (TYLENOL) suppository 650 mg, 650 mg, Rectal, Q6H PRN, Eugenia SHI NP    polyethylene glycol (MIRALAX) packet 17 g, 17 g, Oral, DAILY PRN, Eugenia SHI NP    ondansetron (ZOFRAN ODT) tablet 4 mg, 4 mg, Oral, Q8H PRN **OR** ondansetron (ZOFRAN) injection 4 mg, 4 mg, IntraVENous, Q6H PRN, Gissel SHI NP    0.9% sodium chloride infusion, 125 mL/hr, IntraVENous, CONTINUOUS, Gissel SHI NP, Last Rate: 125 mL/hr at 09/30/21 0737, 125 mL/hr at 09/30/21 0737    cholecalciferol (VITAMIN D3) capsule 1,000 Units, 1,000 Units, Oral, DAILY, Gissel SHI NP    cyanocobalamin (VITAMIN B12) tablet 100 mcg, 100 mcg, Oral, DAILY, Leonora Hansen NP    [Held by provider] losartan (COZAAR) tablet 100 mg, 100 mg, Oral, DAILY, Zhanna Hansen NP    simvastatin (ZOCOR) tablet 20 mg, 20 mg, Oral, QHS, Zhanna Hansen NP    LORazepam (ATIVAN) injection 1 mg, 1 mg, IntraVENous, Q6H PRN, Barnie Nageotte, NP     ALLERGIES:  Allergies reviewed with the patient,  Allergies   Allergen Reactions    Oxycontin [Oxycodone] Other (comments)     hallucinations    Vicodin [Hydrocodone-Acetaminophen] Other (comments)      FAMILY HISTORY:  Family history reviewed. SOCIAL HISTORY:  Notable for former tobacco use, no alcohol use, and no illicit drug use. REVIEW OF SYSTEMS:  Complete review of systems performed, pertinents noted above, all other systems are negative. PHYSICAL EXAMINATION: Vital sign assessment reveal a blood pressure of 96/47 and pulse rate of 60. Cardiovascular exam has a heart with a regular rate and rhythm, normal S1 and S2. No murmur present. There are no rubs or gallops. Good peripheral pulses. No jugular venous distension. Respiratory exam reveals clear lung fields, no rales or rhonchi. Lymphatic exam reveals no edema. Neurologic exam is nonfocal.      Recent labs results and imaging reviewed.   Notable findings include:   Recent Results (from the past 24 hour(s))   CBC WITH AUTOMATED DIFF    Collection Time: 09/29/21  4:50 PM   Result Value Ref Range    WBC 49.2 (H) 4.6 - 13.2 K/uL    RBC 3.46 (L) 4.35 - 5.65 M/uL    HGB 10.4 (L) 13.0 - 16.0 g/dL    HCT 29.3 (L) 36.0 - 48.0 %    MCV 84.7 78.0 - 100.0 FL    MCH 30.1 24.0 - 34.0 PG    MCHC 35.5 31.0 - 37.0 g/dL    RDW 14.3 11.6 - 14.5 %    PLATELET 241 (H) 534 - 420 K/uL    MPV 11.9 (H) 9.2 - 11.8 FL    NRBC 0.0 0.0  WBC    ABSOLUTE NRBC 0.00 0.00 - 0.01 K/uL    NEUTROPHILS 84 (H) 40 - 73 %    BAND NEUTROPHILS 3 0 - 5 %    LYMPHOCYTES 10 (L) 21 - 52 %    MONOCYTES 3 3 - 10 %    EOSINOPHILS 0 0 - 5 %    BASOPHILS 0 0 - 2 %    IMMATURE GRANULOCYTES 0 %    ABS. NEUTROPHILS 42.8 (H) 1.8 - 8.0 K/UL    ABS. LYMPHOCYTES 4.9 (H) 0.9 - 3.6 K/UL    ABS. MONOCYTES 1.5 (H) 0.05 - 1.2 K/UL    ABS. EOSINOPHILS 0.0 0.0 - 0.4 K/UL    ABS. BASOPHILS 0.0 0.0 - 0.1 K/UL    ABS. IMM. GRANS. 0.0 K/UL    DF Manual      PLATELET COMMENTS Clumped Platelets      RBC COMMENTS Anisocytosis  1+       METABOLIC PANEL, BASIC    Collection Time: 09/29/21  4:50 PM   Result Value Ref Range    Sodium 136 135 - 145 mmol/L    Potassium 3.7 3.2 - 5.1 mmol/L    Chloride 98 94 - 111 mmol/L    CO2 22 21 - 33 mmol/L    Anion gap 16 mmol/L    Glucose 132 (H) 70 - 110 mg/dL    BUN 77 (H) 9 - 21 mg/dL    Creatinine 4.60 (H) 0.8 - 1.50 mg/dL    BUN/Creatinine ratio 17      GFR est AA 15 ml/min/1.73m2    GFR est non-AA 12 ml/min/1.73m2    Calcium 7.7 (L) 8.5 - 10.5 mg/dL   HEPATIC FUNCTION PANEL    Collection Time: 09/29/21  4:50 PM   Result Value Ref Range    Protein, total 5.6 (L) 6.1 - 8.4 g/dL    Albumin 2.2 (L) 3.5 - 4.7 g/dL    Globulin 3.4 g/dL    A-G Ratio 0.6      Bilirubin, total 0.5 0.2 - 1.0 mg/dL    Bilirubin, direct 0.1 0.0 - 0.3 mg/dL    Alk.  phosphatase 90 38 - 126 U/L    AST (SGOT) 40 17 - 74 U/L    ALT (SGPT) 19 3 - 72 U/L   LACTIC ACID    Collection Time: 09/29/21  4:50 PM   Result Value Ref Range    Lactic acid 1.4 0.5 - 2.0 mmol/L   PROCALCITONIN    Collection Time: 09/29/21  4:50 PM   Result Value Ref Range    Procalcitonin 7.30 (HH) 0.5 - 2.0 ng/mL   TROPONIN I    Collection Time: 09/29/21  4:50 PM   Result Value Ref Range Troponin-I, Qt. 0.65 (H) 0.02 - 0.05 ng/mL   CULTURE, BLOOD    Collection Time: 09/29/21  7:20 PM    Specimen: Blood   Result Value Ref Range    Special Requests: No Special Requests      Culture result: No growth after 11 hours     EKG, 12 LEAD, INITIAL    Collection Time: 09/29/21  7:20 PM   Result Value Ref Range    Ventricular Rate 58 BPM    Atrial Rate 102 BPM    P-R Interval 204 ms    QRS Duration 162 ms    Q-T Interval 482 ms    QTC Calculation (Bezet) 474 ms    Calculated P Axis -33 degrees    Calculated R Axis -42 degrees    Calculated T Axis 12 degrees    Diagnosis       Sinus rhythm  Multiple premature complexes, vent & supraven  RBBB and LAFB  Left ventricular hypertrophy     URINALYSIS W/ RFLX MICROSCOPIC    Collection Time: 09/29/21  7:24 PM   Result Value Ref Range    Color Dark Yellow      Appearance Turbid      Specific gravity 1.020 1.005 - 1.030      pH (UA) 5.0 5.0 - 8.0      Protein 100 (A) Negative mg/dL    Glucose Negative Negative mg/dL    Ketone Negative Negative mg/dL    Bilirubin Small (A) Negative      Blood Large (A) Negative      Urobilinogen 1.0 0.2 - 1.0 EU/dL    Nitrites Negative Negative      Leukocyte Esterase Small (A) Negative     URINE MICROSCOPIC    Collection Time: 09/29/21  7:24 PM   Result Value Ref Range    WBC 5-10 0 - 4 /hpf    RBC 20-50 0 - 2 /hpf    Epithelial cells Few 0 - 20 /lpf    Bacteria 3+ (A) None /hpf    Mixed Cell Cast 5-10 /lpf   CULTURE, BLOOD    Collection Time: 09/29/21  7:31 PM    Specimen: Blood   Result Value Ref Range    Special Requests: No Special Requests      Culture result: No growth after 11 hours     TROPONIN I    Collection Time: 09/29/21  9:21 PM   Result Value Ref Range    Troponin-I, Qt. 0.35 (H) 0.02 - 1.30 ng/mL   METABOLIC PANEL, COMPREHENSIVE    Collection Time: 09/30/21  4:27 AM   Result Value Ref Range    Sodium 139 135 - 145 mmol/L    Potassium 3.7 3.2 - 5.1 mmol/L    Chloride 109 94 - 111 mmol/L    CO2 19 (L) 21 - 33 mmol/L    Anion gap 11 mmol/L    Glucose 85 70 - 110 mg/dL    BUN 70 (H) 9 - 21 mg/dL    Creatinine 3.20 (H) 0.8 - 1.50 mg/dL    BUN/Creatinine ratio 22      GFR est AA 23 ml/min/1.73m2    GFR est non-AA 19 ml/min/1.73m2    Calcium 7.0 (L) 8.5 - 10.5 mg/dL    Bilirubin, total 0.5 0.2 - 1.0 mg/dL    AST (SGOT) 39 17 - 74 U/L    ALT (SGPT) 20 3 - 72 U/L    Alk. phosphatase 69 38 - 126 U/L    Protein, total 4.8 (L) 6.1 - 8.4 g/dL    Albumin 1.9 (L) 3.5 - 4.7 g/dL    Globulin 2.9 g/dL    A-G Ratio 0.7     MAGNESIUM    Collection Time: 09/30/21  4:27 AM   Result Value Ref Range    Magnesium 1.6 (L) 1.7 - 2.8 mg/dL   LACTIC ACID    Collection Time: 09/30/21  4:27 AM   Result Value Ref Range    Lactic acid 0.8 0.5 - 2.0 mmol/L   CBC WITH AUTOMATED DIFF    Collection Time: 09/30/21  4:27 AM   Result Value Ref Range    WBC 37.5 (H) 4.6 - 13.2 K/uL    RBC 3.32 (L) 4.35 - 5.65 M/uL    HGB 9.8 (L) 13.0 - 16.0 g/dL    HCT 28.3 (L) 36.0 - 48.0 %    MCV 85.2 78.0 - 100.0 FL    MCH 29.5 24.0 - 34.0 PG    MCHC 34.6 31.0 - 37.0 g/dL    RDW 14.5 11.6 - 14.5 %    PLATELET 457 (H) 246 - 420 K/uL    MPV 11.9 (H) 9.2 - 11.8 FL    NRBC 0.0 0.0  WBC    ABSOLUTE NRBC 0.00 0.00 - 0.01 K/uL    NEUTROPHILS 86 (H) 40 - 73 %    BAND NEUTROPHILS 6 (H) 0 - 5 %    LYMPHOCYTES 4 (L) 21 - 52 %    MONOCYTES 2 (L) 3 - 10 %    EOSINOPHILS 0 0 - 5 %    BASOPHILS 0 0 - 2 %    METAMYELOCYTES 2 (H) 0 %    IMMATURE GRANULOCYTES 0 %    ABS. NEUTROPHILS 34.5 (H) 1.8 - 8.0 K/UL    ABS. LYMPHOCYTES 1.5 0.9 - 3.6 K/UL    ABS. MONOCYTES 0.8 0.05 - 1.2 K/UL    ABS. EOSINOPHILS 0.0 0.0 - 0.4 K/UL    ABS. BASOPHILS 0.0 0.0 - 0.1 K/UL    ABS. IMM.  GRANS. 0.0 K/UL    DF Manual      PLATELET COMMENTS Large Platelets      RBC COMMENTS [place] cells  1+       TROPONIN I    Collection Time: 09/30/21  4:27 AM   Result Value Ref Range    Troponin-I, Qt. 0.51 (H) 0.02 - 0.05 ng/mL   C. DIFFICILE AG & TOXIN A/B    Collection Time: 09/30/21  4:27 AM   Result Value Ref Range    GDH ANTIGEN Positive (A) Negative      C. difficile toxin Positive (A) Negative      INTERPRETATION Positive for toxigenic C. difficile (A) Negative for toxigenic C. difficile       Discussed case with Dr. Tyra Yoo, and our impression and recommendations are as follows:  1. Elevated troponins: Troponins are elevated as follows: 0.65-->0.35-->0.51-->0.47 currently. EKG is nonischemic. He is free of any chest pain. Troponin elevation is due to sepsis and VLADIMIR. Will initiate aspirin therapy. Will hold off on any beta blockers due to hypotension. Continue statin. Will check a limited echocardiogram to assess EF and wall motion. Will recheck an EKG. 2. Hypotension: Due to sepsis. Losartan placed on hold. He is receiving IV fluids. Monitor closely. 3. Hyperlipidemia: Continue statin. 4. Ectopy: Frequent supraventricular and ventricular ectopy noted on EKG. Will recheck. Will replete magnesium with 2 grams IV today. Keep serum potassium between 4-5 and serum magnesium > 2. Continue telemetry monitoring. Thank you for involving us in the care of this patient. Please do not hesitate to call me or Dr. Tyra Yoo if additional questions arise. If assistance is needed after hours or on weekends, please call the answering service at [phone].

## 2021-09-30 NOTE — H&P
History and Physical    Subjective:     Gracia Hines is a [de-identified] y.o. male  has a past medical history of Abnormality of gait, Benign neoplasm of colon, Depression, DM (diabetes mellitus) ([facility] [address] Headache, Hypertension, Nonspecific (abnormal) findings on radiological and other examination of genitourinary organs, Other and unspecified hyperlipidemia, Personal history of fall, and Right bundle branch block and left anterior fascicular block. Patient seen at bedside. Patient has baseline dementia oriented to person not place or time. Patient is very thin bedbound. Patient does answer questions that he is not in any pain. Patient was determined to have sepsis related to urinary tract infection in the emergency department was given Levaquin and cefepime. IV fluid was given. Levophed drip was considered but was not started. Patient's white blood cell count is significantly high at 49.2 with 4% bands. Patient is considered septic at this time no fever was reported. He was not tachycardic. But given his significant UTI and leukocytosis he will be treated for sepsis. IV antibiotics will be continued IV fluid will be continued. Blood pressure will be monitored Levophed drip will be started as needed. Discussion with patient's wife via the telephone. She stated patient was on hospice at home but she wants us to save his life. I did discuss CPR and intubation and she did not want either 1 of those for her . Patient was then made a DNR status. I explained to his wife what a DNR status was that we would still continue antibiotics fluids and life-saving efforts. I will place occupational and physical therapy consults Case management consult nutritional consult. Nephrology consult is also appreciated for acute kidney injury.             Past Medical History:   Diagnosis Date    Abnormality of gait     Benign neoplasm of colon     Depression     DM (diabetes mellitus) ([facility] [address]     Headache  Hypertension     Nonspecific (abnormal) findings on radiological and other examination of genitourinary organs     Other and unspecified hyperlipidemia     Personal history of fall     Right bundle branch block and left anterior fascicular block       Past Surgical History:   Procedure Laterality Date    COLONOSCOPY N/A 3/5/2018    COLONOSCOPY w/ biopsies w/ polypectomy performed by Salinas Blackburn MD at [facility]    HX CATARACT REMOVAL      HX CHOLECYSTECTOMY      HX COLONOSCOPY  3/12/2015    HX TONSILLECTOMY       Family History   Problem Relation Age of Onset    Diabetes Mother     Alzheimer Mother     Cancer Father         Lung Cancer      Social History     Tobacco Use    Smoking status: Former Smoker     Types: Cigarettes    Smokeless tobacco: Never Used    Tobacco comment: Quit 40 years ago    Substance Use Topics    Alcohol use: Not Currently     Alcohol/week: 0.0 standard drinks       Prior to Admission medications    Medication Sig Start Date End Date Taking? Authorizing Provider   losartan (COZAAR) 100 mg tablet Take 1 Tablet by mouth daily. 8/19/21  Yes Timoteo Thakkar MD   morphine, 10 mg/5 mL, 10 mg/5 mL oral solution Take 0.25 mL by mouth every  one (1) hour. 9/13/21   Other, MD Dedra   LORazepam (INTENSOL) 2 mg/mL concentrated solution Take 0.5 mL by mouth every four (4) hours as needed for Agitation, Anxiety or Restlessness. Max Daily Amount: 6 mg. 9/4/21   Jermain Linder MD   cyanocobalamin (Vitamin B-12) 100 mcg tablet Take 100 mcg by mouth daily. Provider, Historical   cholecalciferol (Vitamin D3) 25 mcg (1,000 unit) cap Take 1,000 Units by mouth daily. Provider, Historical   simvastatin (ZOCOR) 20 mg tablet Take 1 Tablet by mouth nightly. 8/19/21   Timoteo Thakkar MD   apixaban (ELIQUIS) 2.5 mg tablet Take 1 Tablet by mouth every twelve (12) hours.   Patient not taking: Reported on 9/29/2021 8/19/21   Timoteo Thakkar MD     Allergies   Allergen Reactions    Oxycontin [Oxycodone] Other (comments)     hallucinations    Vicodin [Hydrocodone-Acetaminophen] Other (comments)         Review of Systems   Unable to perform ROS: Dementia          Objective:   VITALS:    Visit Vitals  BP (!) 107/46 (BP 1 Location: Right upper arm, BP Patient Position: At rest)   Pulse 62   Temp 98 °F (36.7 °C)   Resp 18   Ht 5' 6\" (1.676 m)   Wt 59 kg (130 lb)   SpO2 98%   BMI 20.98 kg/m²       Physical Exam  Vitals and nursing note reviewed. Constitutional:       General: He is not in acute distress. Appearance: He is well-developed and underweight. He is ill-appearing. He is not toxic-appearing or diaphoretic. Comments: Patient responds to person not oriented to place or time    Patient bedbound very thin baseline dementia   HENT:      Head: Normocephalic and atraumatic. Nose: Nose normal.   Eyes:      Conjunctiva/sclera: Conjunctivae normal.      Pupils: Pupils are equal, round, and reactive to light. Cardiovascular:      Rate and Rhythm: Normal rate and regular rhythm. Pulses: Normal pulses. Heart sounds: Normal heart sounds. Pulmonary:      Effort: Pulmonary effort is normal. No respiratory distress. Breath sounds: Normal breath sounds. No stridor. Abdominal:      General: Bowel sounds are normal. There is no distension. Palpations: Abdomen is soft. Tenderness: There is no abdominal tenderness. Musculoskeletal:         General: Normal range of motion. Cervical back: Normal range of motion and neck supple. Right lower leg: No edema. Left lower leg: No edema. Skin:     General: Skin is warm and dry. Capillary Refill: Capillary refill takes less than 2 seconds. Neurological:      General: No focal deficit present. Mental Status: He is alert and oriented to person, place, and time. GCS: GCS eye subscore is 4. GCS verbal subscore is 5. GCS motor subscore is 6.       Comments: Baseline dementia patient only oriented to self not oriented to time or place     Psychiatric:         Attention and Perception: Attention normal.         Mood and Affect: Mood normal.         Behavior: Behavior normal.      Comments: Baseline dementia         _______________________________________________________________________  Care Plan discussed with:    Comments   Patient X    Family      RN X    Care Manager                    Consultant:      _______________________________________________________________________  Expected  Disposition:   Home with Family X   HH/PT/OT/RN    SNF/LTC    AMISHA    ________________________________________________________________________  TOTAL TIME:  48 Minutes    Critical Care Provided     Minutes non procedure based      Comments    X Reviewed previous records   >50% of visit spent in counseling and coordination of care X Discussion with patient and/or family and questions answered       ________________________________________________________________________    Labs:  Recent Results (from the past 24 hour(s))   CBC WITH AUTOMATED DIFF    Collection Time: 09/29/21  4:50 PM   Result Value Ref Range    WBC 49.2 (H) 4.6 - 13.2 K/uL    RBC 3.46 (L) 4.35 - 5.65 M/uL    HGB 10.4 (L) 13.0 - 16.0 g/dL    HCT 29.3 (L) 36.0 - 48.0 %    MCV 84.7 78.0 - 100.0 FL    MCH 30.1 24.0 - 34.0 PG    MCHC 35.5 31.0 - 37.0 g/dL    RDW 14.3 11.6 - 14.5 %    PLATELET 188 (H) 058 - 420 K/uL    MPV 11.9 (H) 9.2 - 11.8 FL    NRBC 0.0 0.0  WBC    ABSOLUTE NRBC 0.00 0.00 - 0.01 K/uL    NEUTROPHILS 84 (H) 40 - 73 %    BAND NEUTROPHILS 3 0 - 5 %    LYMPHOCYTES 10 (L) 21 - 52 %    MONOCYTES 3 3 - 10 %    EOSINOPHILS 0 0 - 5 %    BASOPHILS 0 0 - 2 %    IMMATURE GRANULOCYTES 0 %    ABS. NEUTROPHILS 42.8 (H) 1.8 - 8.0 K/UL    ABS. LYMPHOCYTES 4.9 (H) 0.9 - 3.6 K/UL    ABS. MONOCYTES 1.5 (H) 0.05 - 1.2 K/UL    ABS. EOSINOPHILS 0.0 0.0 - 0.4 K/UL    ABS. BASOPHILS 0.0 0.0 - 0.1 K/UL    ABS. IMM.  GRANS. 0.0 K/UL    DF Manual      PLATELET COMMENTS Clumped Platelets      RBC COMMENTS Anisocytosis  1+       METABOLIC PANEL, BASIC    Collection Time: 09/29/21  4:50 PM   Result Value Ref Range    Sodium 136 135 - 145 mmol/L    Potassium 3.7 3.2 - 5.1 mmol/L    Chloride 98 94 - 111 mmol/L    CO2 22 21 - 33 mmol/L    Anion gap 16 mmol/L    Glucose 132 (H) 70 - 110 mg/dL    BUN 77 (H) 9 - 21 mg/dL    Creatinine 4.60 (H) 0.8 - 1.50 mg/dL    BUN/Creatinine ratio 17      GFR est AA 15 ml/min/1.73m2    GFR est non-AA 12 ml/min/1.73m2    Calcium 7.7 (L) 8.5 - 10.5 mg/dL   HEPATIC FUNCTION PANEL    Collection Time: 09/29/21  4:50 PM   Result Value Ref Range    Protein, total 5.6 (L) 6.1 - 8.4 g/dL    Albumin 2.2 (L) 3.5 - 4.7 g/dL    Globulin 3.4 g/dL    A-G Ratio 0.6      Bilirubin, total 0.5 0.2 - 1.0 mg/dL    Bilirubin, direct 0.1 0.0 - 0.3 mg/dL    Alk.  phosphatase 90 38 - 126 U/L    AST (SGOT) 40 17 - 74 U/L    ALT (SGPT) 19 3 - 72 U/L   LACTIC ACID    Collection Time: 09/29/21  4:50 PM   Result Value Ref Range    Lactic acid 1.4 0.5 - 2.0 mmol/L   PROCALCITONIN    Collection Time: 09/29/21  4:50 PM   Result Value Ref Range    Procalcitonin 7.30 (HH) 0.5 - 2.0 ng/mL   TROPONIN I    Collection Time: 09/29/21  4:50 PM   Result Value Ref Range    Troponin-I, Qt. 0.65 (H) 0.02 - 0.05 ng/mL   URINALYSIS W/ RFLX MICROSCOPIC    Collection Time: 09/29/21  7:24 PM   Result Value Ref Range    Color Dark Yellow      Appearance Turbid      Specific gravity 1.020 1.005 - 1.030      pH (UA) 5.0 5.0 - 8.0      Protein 100 (A) Negative mg/dL    Glucose Negative Negative mg/dL    Ketone Negative Negative mg/dL    Bilirubin Small (A) Negative      Blood Large (A) Negative      Urobilinogen 1.0 0.2 - 1.0 EU/dL    Nitrites Negative Negative      Leukocyte Esterase Small (A) Negative     URINE MICROSCOPIC    Collection Time: 09/29/21  7:24 PM   Result Value Ref Range    WBC 5-10 0 - 4 /hpf    RBC 20-50 0 - 2 /hpf    Epithelial cells Few 0 - 20 /lpf    Bacteria 3+ (A) None /hpf    Mixed Cell Cast 5-10 /lpf   TROPONIN I    Collection Time: 09/29/21  9:21 PM   Result Value Ref Range    Troponin-I, Qt. 0.35 (H) 0.02 - 0.05 ng/mL       Imaging:  No results found. Assessment & Plan:       Sepsis due to urinary tract infection ([facility] [address]  Admit to hospitalist group  Levaquin and cefepime ordered  Repeat lactate at 4 AM second lactate is pending  Sepsis secondary to suspected UTI  Patient has leukocytosis on CBC with 3% bands  Repeat labs daily    Elevated troponin  Troponin elevated suspected from VLADIMIR  We will repeat in the morning  no chest pain was reported  ACS or NSTEMI not suspected at this time    Acute kidney injury ([facility] [address]  Acute kidney injury related to sepsis dehydration  IV fluids given in ED  Normal saline will be continued continuous for rehydration  Repeat labs in the morning  Nephrology consultation    Failure to thrive in adult  Patient made DNR status after conversation with wife  Antibiotics and fluid will be continued  Nutritional consult will be placed        Code Status: DNR      Prophylaxis: Heparin subcu for DVT prophylaxis        Electronically Signed : Sanjay Cheng ENP-C, FNP-C, [address] voice recognition was used to generate this report, which may have resulted in some phonetic based errors in grammar and contents.  Even though attempts were made to correct all the mistakes, some may have been missed, and remained in the body of the document

## 2021-09-30 NOTE — PROGRESS NOTES
Hospitalist Progress Note             Date of Service:  2021  NAME:  Shakira Raya  :  1940  MRN:  [id number]    Assessment & Plan:         Sepsis due to cdif colitis  Dc Levaquin and cefepime   Cont ivf  - start po vanco    Acute kidney injury ([facility] [address]  Acute kidney injury related to sepsis dehydration  IV fluids given in ED  Normal saline will be continued continuous for rehydration  Trend cr, 4.6 to 3.2       Elevated troponin  Troponin elevated suspected from VLADIMIR  We will repeat in the morning  no chest pain was reported  ACS or NSTEMI not suspected at this time     Urethral bleeding  - james changed otu, observe, hold heparin     Failure to thrive in adult  Patient made DNR status after conversation with wife  Antibiotics and fluid will be continued  Nutritional consult will be placed        Hospital Problems  Date Reviewed: 2017        Codes Class Noted POA    Failure to thrive in adult ICD-10-CM: R62.7  ICD-9-CM: 783.7  2021 Unknown        Acute kidney injury ([facility] ICD-10-CM: N17.9  ICD-9-CM: 584.9  2021 Unknown        Elevated troponin ICD-10-CM: R77.8  ICD-9-CM: 790.6  2021 Unknown        Sepsis due to urinary tract infection ([facility] [address] ICD-10-CM: A41.9, N39.0  ICD-9-CM: 038.9, 995.91, 599.0  2021 Unknown                Review of Systems:   A comprehensive review of systems was negative except for that written in the HPI. Vital Signs:    Last 24hrs VS reviewed since prior progress note.  Most recent are:  Visit Vitals  BP (!) 96/47   Pulse 60   Temp 97 °F (36.1 °C)   Resp 18   Ht 5' 6\" (1.676 m)   Wt 59 kg (130 lb)   SpO2 98%   BMI 20.98 kg/m²         Intake/Output Summary (Last 24 hours) at 2021 1011  Last data filed at 2021 0641  Gross per 24 hour   Intake 1000 ml   Output 300 ml   Net 700 ml        Physical Examination:             General:          Alert, cooperative, no distress, appears stated age. HEENT:           Atraumatic, anicteric sclerae, pink conjunctivae                          No oral ulcers, mucosa moist, throat clear, dentition fair  Neck:               Supple, symmetrical  Lungs:             Clear to auscultation bilaterally. No Wheezing or Rhonchi. No rales. Chest wall:      No tenderness  No Accessory muscle use. Heart:              Regular  rhythm,  No  murmur   No edema  Abdomen:        Soft, non-tender. Not distended. Bowel sounds normal  Extremities:     No cyanosis. No clubbing,                            Skin turgor normal, Capillary refill normal  Skin:                Not pale. Not Jaundiced  No rashes   Psych:             Not anxious or agitated. Neurologic:      Alert, moves all extremities, answers questions appropriately and responds to commands, dementia  + james in place       Data Review:    Review and/or order of clinical lab test  Review and/or order of tests in the radiology section of CPT  Review and/or order of tests in the medicine section of CPT      Labs:     Recent Labs     09/30/21 0427 09/29/21  1650   WBC 37.5* 49.2*   HGB 9.8* 10.4*   HCT 28.3* 29.3*   * 506*     Recent Labs     09/30/21 0427 09/29/21  1650    136   K 3.7 3.7    98   CO2 19* 22   BUN 70* 77*   CREA 3.20* 4.60*   GLU 85 132*   CA 7.0* 7.7*   MG 1.6*  --      Recent Labs     09/30/21 0427 09/29/21  1650   ALT 20 19   AP 69 90   TBILI 0.5 0.5   TP 4.8* 5.6*   ALB 1.9* 2.2*   GLOB 2.9 3.4     No results for input(s): INR, PTP, APTT, INREXT in the last 72 hours. No results for input(s): FE, TIBC, PSAT, FERR in the last 72 hours. No results found for: FOL, RBCF   No results for input(s): PH, PCO2, PO2 in the last 72 hours.   Recent Labs     09/30/21 0427 09/29/21 2121 09/29/21  1650   TROIQ 0.51* 0.35* 0.65*     No results found for: CHOL, CHOLX, CHLST, CHOLV, HDL, HDLP, LDL, LDLC, DLDLP, TGLX, TRIGL, TRIGP, CHHD, CHHDX  Lab Results   Component Value Date/Time    Glucose (POC) 154 (H) 09/04/2021 12:00 PM    Glucose (POC) 126 (H) 09/04/2021 08:03 AM    Glucose (POC) 177 (H) 09/03/2021 09:03 PM    Glucose (POC) 152 (H) 09/03/2021 04:00 PM    Glucose (POC) 169 (H) 09/03/2021 10:54 AM     Lab Results   Component Value Date/Time    Color Dark Yellow 09/29/2021 07:24 PM    Appearance Turbid 09/29/2021 07:24 PM    Specific gravity 1.020 09/29/2021 07:24 PM    pH (UA) 5.0 09/29/2021 07:24 PM    Protein 100 (A) 09/29/2021 07:24 PM    Glucose Negative 09/29/2021 07:24 PM    Ketone Negative 09/29/2021 07:24 PM    Bilirubin Small (A) 09/29/2021 07:24 PM    Urobilinogen 1.0 09/29/2021 07:24 PM    Nitrites Negative 09/29/2021 07:24 PM    Leukocyte Esterase Small (A) 09/29/2021 07:24 PM    Epithelial cells Few 09/29/2021 07:24 PM    Bacteria 3+ (A) 09/29/2021 07:24 PM    WBC 5-10 09/29/2021 07:24 PM    RBC 20-50 09/29/2021 07:24 PM         Medications Reviewed:     Current Facility-Administered Medications   Medication Dose Route Frequency    loperamide (IMODIUM) capsule 2 mg  2 mg Oral Q4H PRN    [START ON 10/1/2021] levoFLOXacin (LEVAQUIN) 500 mg in D5W IVPB  500 mg IntraVENous Q48H    cefepime (MAXIPIME) 0.5 g in 0.9% sodium chloride (MBP/ADV) 50 mL MBP  500 mg IntraVENous Q24H    sodium chloride (NS) flush 5-10 mL  5-10 mL IntraVENous PRN    sodium chloride (NS) flush 5-40 mL  5-40 mL IntraVENous Q8H    sodium chloride (NS) flush 5-40 mL  5-40 mL IntraVENous PRN    acetaminophen (TYLENOL) tablet 650 mg  650 mg Oral Q6H PRN    Or    acetaminophen (TYLENOL) suppository 650 mg  650 mg Rectal Q6H PRN    polyethylene glycol (MIRALAX) packet 17 g  17 g Oral DAILY PRN    ondansetron (ZOFRAN ODT) tablet 4 mg  4 mg Oral Q8H PRN    Or    ondansetron (ZOFRAN) injection 4 mg  4 mg IntraVENous Q6H PRN    0.9% sodium chloride infusion  125 mL/hr IntraVENous CONTINUOUS    cholecalciferol (VITAMIN D3) capsule 1,000 Units  1,000 Units Oral DAILY    cyanocobalamin (VITAMIN B12) tablet 100 mcg  100 mcg Oral DAILY    [Held by provider] losartan (COZAAR) tablet 100 mg  100 mg Oral DAILY    simvastatin (ZOCOR) tablet 20 mg  20 mg Oral QHS    LORazepam (ATIVAN) injection 1 mg  1 mg IntraVENous Q6H PRN     ______________________________________________________________________  EXPECTED LENGTH OF STAY: - - -  ACTUAL LENGTH OF STAY:          1                 Shirley Stallworth MD

## 2021-09-30 NOTE — PROGRESS NOTES
Admission Medication Reconciliation:    Information obtained from:  patient medication dispense history in Epic as patient is positive for C. Diff and on Enteric Contact    Comments/Recommendations: Reviewed PTA medications and patient's allergies. Reviewed medications and added the following  Amlodipine 10mg po daily (filled 8/14/21 for 30 day supply)  Chlorthalidone 50mg po daily (filled 7/2/21 for 90 day supply)  Hydralazine 50mg po tid (filled 7/2/21 for 90 day supply)    Unknown at the current moment if patient is compliant with medications. Allergies:  Oxycontin [oxycodone] and Vicodin [hydrocodone-acetaminophen]    Significant PMH/Disease States:   Past Medical History:   Diagnosis Date    Abnormality of gait     Benign neoplasm of colon     Depression     DM (diabetes mellitus) (HCC)     Headache     Hypertension     Nonspecific (abnormal) findings on radiological and other examination of genitourinary organs     Other and unspecified hyperlipidemia     Personal history of fall     Right bundle branch block and left anterior fascicular block      Chief Complaint for this Admission:    Chief Complaint   Patient presents with    Urinary Catheter Problem     Prior to Admission Medications:   Prior to Admission Medications   Prescriptions Last Dose Informant Patient Reported? Taking? LORazepam (INTENSOL) 2 mg/mL concentrated solution Unknown at Unknown time  No No   Sig: Take 0.5 mL by mouth every four (4) hours as needed for Agitation, Anxiety or Restlessness. Max Daily Amount: 6 mg. amLODIPine (NORVASC) 10 mg tablet Unknown at Unknown time  Yes No   Sig: Take 10 mg by mouth daily. apixaban (ELIQUIS) 2.5 mg tablet Not Taking at Unknown time  No No   Sig: Take 1 Tablet by mouth every twelve (12) hours. Patient not taking: Reported on 9/29/2021   chlorthalidone (HYGROTEN) 50 mg tablet Unknown at Unknown time  Yes No   Sig: Take 50 mg by mouth daily.    cholecalciferol (Vitamin D3) 25 mcg (1,000 unit) cap Unknown at Unknown time  Yes No   Sig: Take 1,000 Units by mouth daily. cyanocobalamin (Vitamin B-12) 100 mcg tablet Unknown at Unknown time  Yes No   Sig: Take 100 mcg by mouth daily. hydrALAZINE (APRESOLINE) 50 mg tablet Unknown at Unknown time  Yes No   Sig: Take 50 mg by mouth three (3) times daily. losartan (COZAAR) 100 mg tablet 9/29/2021 at Unknown time  No Yes   Sig: Take 1 Tablet by mouth daily. morphine, 10 mg/5 mL, 10 mg/5 mL oral solution Unknown at Unknown time  Yes No   Sig: Take 0.25 mL by mouth every  one (1) hour. simvastatin (ZOCOR) 20 mg tablet Unknown at Unknown time  No No   Sig: Take 1 Tablet by mouth nightly.       Facility-Administered Medications: None       NILESH Martinez

## 2021-09-30 NOTE — ASSESSMENT & PLAN NOTE
Admit to hospitalist group  Levaquin and cefepime ordered  Repeat lactate at 4 AM second lactate is pending  Sepsis secondary to suspected UTI  Patient has leukocytosis on CBC with 3% bands  Repeat labs daily

## 2021-09-30 NOTE — PROGRESS NOTES
Patient cleaned of incont stool.  Brief and sheet changed patient denies any current needs CB in reach

## 2021-10-01 LAB
ANION GAP SERPL CALC-SCNC: 10 MMOL/L
BASOPHILS # BLD: 0 K/UL (ref 0–0.1)
BASOPHILS NFR BLD: 0 % (ref 0–2)
BUN SERPL-MCNC: 60 MG/DL (ref 9–21)
BUN/CREAT SERPL: 32
CA-I BLD-MCNC: 7.3 MG/DL (ref 8.5–10.5)
CHLORIDE SERPL-SCNC: 117 MMOL/L (ref 94–111)
CK SERPL-CCNC: 333 U/L (ref 38–174)
CO2 SERPL-SCNC: 17 MMOL/L (ref 21–33)
CREAT SERPL-MCNC: 1.9 MG/DL (ref 0.8–1.5)
DIFFERENTIAL METHOD BLD: ABNORMAL
EOSINOPHIL # BLD: 0 K/UL (ref 0–0.4)
EOSINOPHIL NFR BLD: 0 % (ref 0–5)
ERYTHROCYTE [DISTWIDTH] IN BLOOD BY AUTOMATED COUNT: 14.7 % (ref 11.6–14.5)
GLUCOSE SERPL-MCNC: 52 MG/DL (ref 70–110)
HCT VFR BLD AUTO: 26.1 % (ref 36–48)
HGB BLD-MCNC: 9 G/DL (ref 13–16)
IMM GRANULOCYTES # BLD AUTO: 0 K/UL
IMM GRANULOCYTES NFR BLD AUTO: 0 %
LYMPHOCYTES # BLD: 2.6 K/UL (ref 0.9–3.6)
LYMPHOCYTES NFR BLD: 9 % (ref 21–52)
MAGNESIUM SERPL-MCNC: 1.9 MG/DL (ref 1.7–2.8)
MCH RBC QN AUTO: 29.3 PG (ref 24–34)
MCHC RBC AUTO-ENTMCNC: 34.5 G/DL (ref 31–37)
MCV RBC AUTO: 85 FL (ref 78–100)
MONOCYTES # BLD: 1.4 K/UL (ref 0.05–1.2)
MONOCYTES NFR BLD: 5 % (ref 3–10)
NEUTS SEG # BLD: 24.9 K/UL (ref 1.8–8)
NEUTS SEG NFR BLD: 86 % (ref 40–73)
NRBC # BLD: 0 K/UL (ref 0–0.01)
NRBC BLD-RTO: 0 PER 100 WBC
PHOSPHATE SERPL-MCNC: 3.1 MG/DL (ref 2.5–4.5)
PLATELET # BLD AUTO: 500 K/UL (ref 135–420)
PMV BLD AUTO: 11.7 FL (ref 9.2–11.8)
POTASSIUM SERPL-SCNC: 3.7 MMOL/L (ref 3.2–5.1)
RBC # BLD AUTO: 3.07 M/UL (ref 4.35–5.65)
RBC MORPH BLD: ABNORMAL
SODIUM SERPL-SCNC: 144 MMOL/L (ref 135–145)
WBC # BLD AUTO: 28.9 K/UL (ref 4.6–13.2)

## 2021-10-01 PROCEDURE — 65270000029 HC RM PRIVATE

## 2021-10-01 PROCEDURE — 74011250637 HC RX REV CODE- 250/637: Performed by: INTERNAL MEDICINE

## 2021-10-01 PROCEDURE — 84100 ASSAY OF PHOSPHORUS: CPT

## 2021-10-01 PROCEDURE — 85025 COMPLETE CBC W/AUTO DIFF WBC: CPT

## 2021-10-01 PROCEDURE — 36415 COLL VENOUS BLD VENIPUNCTURE: CPT

## 2021-10-01 PROCEDURE — 83735 ASSAY OF MAGNESIUM: CPT

## 2021-10-01 PROCEDURE — 74011250636 HC RX REV CODE- 250/636: Performed by: INTERNAL MEDICINE

## 2021-10-01 PROCEDURE — 80048 BASIC METABOLIC PNL TOTAL CA: CPT

## 2021-10-01 PROCEDURE — 82550 ASSAY OF CK (CPK): CPT

## 2021-10-01 PROCEDURE — 74011250637 HC RX REV CODE- 250/637: Performed by: NURSE PRACTITIONER

## 2021-10-01 RX ORDER — DEXTROSE, SODIUM CHLORIDE, AND POTASSIUM CHLORIDE 5; .45; .15 G/100ML; G/100ML; G/100ML
50 INJECTION INTRAVENOUS CONTINUOUS
Status: DISCONTINUED | OUTPATIENT
Start: 2021-10-01 | End: 2021-10-07 | Stop reason: HOSPADM

## 2021-10-01 RX ADMIN — VANCOMYCIN HYDROCHLORIDE 250 MG: KIT at 17:55

## 2021-10-01 RX ADMIN — VANCOMYCIN HYDROCHLORIDE 250 MG: KIT at 05:39

## 2021-10-01 RX ADMIN — Medication 10 ML: at 21:38

## 2021-10-01 RX ADMIN — VANCOMYCIN HYDROCHLORIDE 250 MG: KIT at 12:38

## 2021-10-01 RX ADMIN — Medication 1000 UNITS: at 09:39

## 2021-10-01 RX ADMIN — ASPIRIN 81 MG: 81 TABLET, COATED ORAL at 09:39

## 2021-10-01 RX ADMIN — Medication 10 ML: at 13:48

## 2021-10-01 RX ADMIN — POTASSIUM CHLORIDE, DEXTROSE MONOHYDRATE AND SODIUM CHLORIDE 75 ML/HR: 150; 5; 450 INJECTION, SOLUTION INTRAVENOUS at 09:39

## 2021-10-01 RX ADMIN — CYANOCOBALAMIN TAB 500 MCG 250 MCG: 500 TAB at 09:39

## 2021-10-01 RX ADMIN — Medication 10 ML: at 05:39

## 2021-10-01 RX ADMIN — ATORVASTATIN CALCIUM 10 MG: 10 TABLET, FILM COATED ORAL at 21:39

## 2021-10-01 RX ADMIN — POTASSIUM CHLORIDE, DEXTROSE MONOHYDRATE AND SODIUM CHLORIDE 75 ML/HR: 150; 5; 450 INJECTION, SOLUTION INTRAVENOUS at 21:41

## 2021-10-01 NOTE — PROGRESS NOTES
1377- received care of patient resting in bed patient denies any needs. IVF infusing. CB in reach    0939- patient tolerated am medications with sips of water.      1100- patient resting in bed denies any needs CB in reach

## 2021-10-01 NOTE — PROGRESS NOTES
Hospitalist Progress Note             Date of Service:  10/1/2021  NAME:  Gracia Hines  :  1940  MRN:  [id number]    Assessment & Plan:         Sepsis due to cdif colitis  Dc Levaquin and cefepime   Cont ivf  - cont po vanco  - pt denies diarrhea, will need to confirm this with nursing due to dementia     Acute kidney injury ([facility]  Acute kidney injury related to sepsis dehydration  IV fluids given in ED  Normal saline will be continued continuous for rehydration  Trend cr, 4.6 to 3.2 to 1.9,  - another day ivf, then if he is taking po well, we can stop and consider dc         Elevated troponin  Troponin elevated suspected from VLADIMIR  We will repeat in the morning  no chest pain was reported  ACS or NSTEMI not suspected at this time     Urethral bleeding  - james changed out, holding heparin     Failure to thrive in adult  Patient made DNR status after conversation with wife  Antibiotics and fluid will be continued          Hospital Problems  Date Reviewed: 2017        Codes Class Noted POA    Failure to thrive in adult ICD-10-CM: R62.7  ICD-9-CM: 783.7  2021 Unknown        Severe protein-calorie malnutrition ([facility] ICD-10-CM: E43  ICD-9-CM: 262  2021 Unknown        Acute kidney injury ([facility] ICD-10-CM: N17.9  ICD-9-CM: 584.9  2021 Unknown        Elevated troponin ICD-10-CM: R77.8  ICD-9-CM: 790.6  2021 Unknown        Sepsis due to urinary tract infection ([facility] ICD-10-CM: A41.9, N39.0  ICD-9-CM: 038.9, 995.91, 599.0  2021 Unknown                Review of Systems:   A comprehensive review of systems was negative except for that written in the HPI. Pt denies diarrhea, no copmlaints, feels well      Vital Signs:    Last 24hrs VS reviewed since prior progress note.  Most recent are:  Visit Vitals  BP (!) 131/58 (BP 1 Location: Right upper arm, BP Patient Position: At rest)   Pulse (!) 58 Temp 97 °F (36.1 °C)   Resp 16   Ht 5' 6\" (1.676 m)   Wt 59 kg (130 lb)   SpO2 94%   BMI 20.98 kg/m²         Intake/Output Summary (Last 24 hours) at 10/1/2021 1118  Last data filed at 10/1/2021 0537  Gross per 24 hour   Intake 100 ml   Output 1200 ml   Net -1100 ml        Physical Examination:             General:          Alert, cooperative, no distress, appears stated age. HEENT:           Atraumatic, anicteric sclerae, pink conjunctivae                          No oral ulcers, mucosa moist, throat clear, dentition fair  Neck:               Supple, symmetrical  Lungs:             Clear to auscultation bilaterally. No Wheezing or Rhonchi. No rales. Chest wall:      No tenderness  No Accessory muscle use. Heart:              Regular  rhythm,  No  murmur   No edema  Abdomen:        Soft, non-tender. Not distended. Bowel sounds normal  Extremities:     No cyanosis. No clubbing,                            Skin turgor normal, Capillary refill normal  Skin:                Not pale. Not Jaundiced  No rashes   Psych:             Not anxious or agitated.   Neurologic:      Alert, moves all extremities, answers questions appropriately and responds to commands = +dementia       Data Review:    Review and/or order of clinical lab test  Review and/or order of tests in the radiology section of CPT  Review and/or order of tests in the medicine section of CPT      Labs:     Recent Labs     10/01/21  0500 09/30/21 0427   WBC 28.9* 37.5*   HGB 9.0* 9.8*   HCT 26.1* 28.3*   * 467*     Recent Labs     10/01/21  0500 09/30/21 0427 09/29/21  1650    139 136   K 3.7 3.7 3.7   * 109 98   CO2 17* 19* 22   BUN 60* 70* 77*   CREA 1.90* 3.20* 4.60*   GLU 52* 85 132*   CA 7.3* 7.0* 7.7*   MG 1.9 1.6*  --    PHOS 3.1  --   --      Recent Labs     09/30/21 0427 09/29/21  1650   ALT 20 19   AP 69 90   TBILI 0.5 0.5   TP 4.8* 5.6*   ALB 1.9* 2.2*   GLOB 2.9 3.4     No results for input(s): INR, PTP, APTT, INREXT in the last 72 hours. No results for input(s): FE, TIBC, PSAT, FERR in the last 72 hours. No results found for: FOL, RBCF   No results for input(s): PH, PCO2, PO2 in the last 72 hours.   Recent Labs     10/01/21  0500 09/30/21  1000 09/30/21  0427 09/29/21  2121   *  --   --   --    TROIQ  --  0.47* 0.51* 0.35*     No results found for: CHOL, CHOLX, CHLST, CHOLV, HDL, HDLP, LDL, LDLC, DLDLP, TGLX, TRIGL, TRIGP, CHHD, CHHDX  Lab Results   Component Value Date/Time    Glucose (POC) 154 (H) 09/04/2021 12:00 PM    Glucose (POC) 126 (H) 09/04/2021 08:03 AM    Glucose (POC) 177 (H) 09/03/2021 09:03 PM    Glucose (POC) 152 (H) 09/03/2021 04:00 PM    Glucose (POC) 169 (H) 09/03/2021 10:54 AM     Lab Results   Component Value Date/Time    Color Dark Yellow 09/29/2021 07:24 PM    Appearance Turbid 09/29/2021 07:24 PM    Specific gravity 1.020 09/29/2021 07:24 PM    pH (UA) 5.0 09/29/2021 07:24 PM    Protein 100 (A) 09/29/2021 07:24 PM    Glucose Negative 09/29/2021 07:24 PM    Ketone Negative 09/29/2021 07:24 PM    Bilirubin Small (A) 09/29/2021 07:24 PM    Urobilinogen 1.0 09/29/2021 07:24 PM    Nitrites Negative 09/29/2021 07:24 PM    Leukocyte Esterase Small (A) 09/29/2021 07:24 PM    Epithelial cells Few 09/29/2021 07:24 PM    Bacteria 3+ (A) 09/29/2021 07:24 PM    WBC 5-10 09/29/2021 07:24 PM    RBC 20-50 09/29/2021 07:24 PM         Medications Reviewed:     Current Facility-Administered Medications   Medication Dose Route Frequency    dextrose 5% - 0.45% NaCl with KCl 20 mEq/L infusion  75 mL/hr IntraVENous CONTINUOUS    loperamide (IMODIUM) capsule 2 mg  2 mg Oral Q4H PRN    vancomycin (FIRVANQ) 50 mg/mL oral solution 250 mg  250 mg Oral Q6H    aspirin delayed-release tablet 81 mg  81 mg Oral DAILY    atorvastatin (LIPITOR) tablet 10 mg  10 mg Oral QHS    cyanocobalamin (VITAMIN B12) tablet 250 mcg  250 mcg Oral DAILY    cholecalciferol (VITAMIN D3) (1000 Units /25 mcg) tablet 1,000 Units  1,000 Units Oral DAILY    sodium chloride (NS) flush 5-10 mL  5-10 mL IntraVENous PRN    sodium chloride (NS) flush 5-40 mL  5-40 mL IntraVENous Q8H    sodium chloride (NS) flush 5-40 mL  5-40 mL IntraVENous PRN    acetaminophen (TYLENOL) tablet 650 mg  650 mg Oral Q6H PRN    Or    acetaminophen (TYLENOL) suppository 650 mg  650 mg Rectal Q6H PRN    polyethylene glycol (MIRALAX) packet 17 g  17 g Oral DAILY PRN    ondansetron (ZOFRAN ODT) tablet 4 mg  4 mg Oral Q8H PRN    Or    ondansetron (ZOFRAN) injection 4 mg  4 mg IntraVENous Q6H PRN    [Held by provider] losartan (COZAAR) tablet 100 mg  100 mg Oral DAILY    LORazepam (ATIVAN) injection 1 mg  1 mg IntraVENous Q6H PRN     ______________________________________________________________________  EXPECTED LENGTH OF STAY: 3d 12h  ACTUAL LENGTH OF STAY:          2                 Gurinder Spencer MD

## 2021-10-01 NOTE — PROGRESS NOTES
CARDIOLOGY PROGRESS NOTE - NP    Patient seen and examined. This is a patient who is followed for elevated troponin. He denies chest pain or shortness of breath. No other complaints reported. Telemetry reviewed, there were no events noted in the past 24 hours. He is in sinus bradycardia. Pertinent review of systems items noted above, all other systems are negative. Current medications reviewed. PHYSICAL EXAMINATION: Vital sign assessment reveal a blood pressure of 125/48 and pulse rate of 58. Cardiovascular exam has a heart with a regular rate and rhythm, normal S1 and S2. No murmur present. There are no rubs or gallops. Good peripheral pulses. No jugular venous distension. Respiratory exam reveals clear lung fields, no rales or rhonchi. Lymphatic exam reveals no edema. Neurologic exam is nonfocal.      Recent labs results and imaging reviewed. Notable findings include:   Recent Results (from the past 24 hour(s))   EKG, 12 LEAD, SUBSEQUENT    Collection Time: 09/30/21 12:34 PM   Result Value Ref Range    Ventricular Rate 58 BPM    Atrial Rate 58 BPM    P-R Interval 157 ms    QRS Duration 161 ms    Q-T Interval 498 ms    QTC Calculation (Bezet) 490 ms    Calculated P Axis -79 degrees    Calculated R Axis -42 degrees    Calculated T Axis 6 degrees    Diagnosis       Sinus or ectopic atrial rhythm  RBBB and LAFB  Left ventricular hypertrophy    Confirmed by OTILIO Pope ([id number]) on 9/30/2021 4:14:12 PM     ECHO ADULT FOLLOW-UP OR LIMITED    Collection Time: 09/30/21  3:28 PM   Result Value Ref Range    AV Cusp 2.00 cm    Ao Root D 3.80 cm    AO ASC D 3.80 cm    IVSd 1.20 (A) 0.60 - 1.00 cm    LVIDd 4.20 4. 20 - 5.90 cm    LVIDs 2.80 cm    LVOT d 2.10 cm    LVPWd 1.00 0.60 - 1.00 cm    LV ED Vol A2C 74.10 cm3    LV ES Vol A2C 22.00 cm3    Left Atrium Major Axis 4.40 cm    Left Atrium to Aortic Root Ratio 1.16     Est. RA Pressure 3.00 mmHg    RVIDd 3.90 cm    RVSP 44.00 mmHg    TR Max Velocity 319.00 cm/s TV MG 41.00 mmHg    BP EF 62.3 55.0 - 100.0 %    LV Mass .1 88.0 - 224.0 g    LV Mass AL Index 94.0 49.0 - 115.0 g/m2    Left Atrium Minor Axis 2.63 cm   CBC WITH AUTOMATED DIFF    Collection Time: 10/01/21  5:00 AM   Result Value Ref Range    WBC 28.9 (H) 4.6 - 13.2 K/uL    RBC 3.07 (L) 4.35 - 5.65 M/uL    HGB 9.0 (L) 13.0 - 16.0 g/dL    HCT 26.1 (L) 36.0 - 48.0 %    MCV 85.0 78.0 - 100.0 FL    MCH 29.3 24.0 - 34.0 PG    MCHC 34.5 31.0 - 37.0 g/dL    RDW 14.7 (H) 11.6 - 14.5 %    PLATELET 131 (H) 244 - 420 K/uL    MPV 11.7 9.2 - 11.8 FL    NRBC 0.0 0.0  WBC    ABSOLUTE NRBC 0.00 0.00 - 0.01 K/uL    NEUTROPHILS 86 (H) 40 - 73 %    LYMPHOCYTES 9 (L) 21 - 52 %    MONOCYTES 5 3 - 10 %    EOSINOPHILS 0 0 - 5 %    BASOPHILS 0 0 - 2 %    IMMATURE GRANULOCYTES 0 %    ABS. NEUTROPHILS 24.9 (H) 1.8 - 8.0 K/UL    ABS. LYMPHOCYTES 2.6 0.9 - 3.6 K/UL    ABS. MONOCYTES 1.4 (H) 0.05 - 1.2 K/UL    ABS. EOSINOPHILS 0.0 0.0 - 0.4 K/UL    ABS. BASOPHILS 0.0 0.0 - 0.1 K/UL    ABS. IMM.  GRANS. 0.0 K/UL    DF AUTOMATED      RBC COMMENTS Normocytic, Normochromic     MAGNESIUM    Collection Time: 10/01/21  5:00 AM   Result Value Ref Range    Magnesium 1.9 1.7 - 2.8 mg/dL   METABOLIC PANEL, BASIC    Collection Time: 10/01/21  5:00 AM   Result Value Ref Range    Sodium 144 135 - 145 mmol/L    Potassium 3.7 3.2 - 5.1 mmol/L    Chloride 117 (H) 94 - 111 mmol/L    CO2 17 (L) 21 - 33 mmol/L    Anion gap 10 mmol/L    Glucose 52 (LL) 70 - 110 mg/dL    BUN 60 (H) 9 - 21 mg/dL    Creatinine 1.90 (H) 0.8 - 1.50 mg/dL    BUN/Creatinine ratio 32      GFR est AA 42 ml/min/1.73m2    GFR est non-AA 34 ml/min/1.73m2    Calcium 7.3 (L) 8.5 - 10.5 mg/dL   PHOSPHORUS    Collection Time: 10/01/21  5:00 AM   Result Value Ref Range    Phosphorus 3.1 2.5 - 4.5 mg/dL   CK    Collection Time: 10/01/21  5:00 AM   Result Value Ref Range     (H) 38 - 174 U/L     Discussed case with Dr. Gus Pena, and our impression and recommendations are as follows:  1. Elevated troponins: Troponins peaked at 0.65. EKG nonischemic. He remains free of any chest pain. Troponin elevation is due to sepsis and VLADIMIR. Continue aspirin and statin. Hold off on beta blocker for now due to hypotension. Limited echocardiogram shows preserved EF of 62% with mild LVH, abnormal septal motion, moderate tricuspid regurgitation, and mild pulmonary HTN. 2. Hypotension: Due to sepsis. Losartan still on hold. He is receiving IV fluids. Monitor closely. If antihypertensive is needed, would recommend to resume losartan but at decreased dose of 25 or 50 mg daily. 3. Hyperlipidemia: Continue statin. 4. Ectopy: Improved. Keep serum potassium between 4-5 and serum magnesium > 2. Continue telemetry monitoring. Thank you for involving us in the care of this patient. Please do not hesitate to call me or Dr. Pedro Cedeno if additional questions arise. If assistance is needed after hours or on weekends, please call the answering service at [phone].

## 2021-10-01 NOTE — PROGRESS NOTES
Called Sherron with [facility] to let her know pt will probably be discharged over the weekend. Sherron states [facility] will not be able to take pt back unless pt has 24/7 care to help Mrs German Henriquez take care of her . She states Mrs German Henriquez would call every day for help, and it took a lot of staff away from seeing other patients. She states ideally pt would be better off in a LTC. Sherron states she will get their SW to talk with pt's wife the first of the week.

## 2021-10-02 LAB
ANION GAP SERPL CALC-SCNC: 7 MMOL/L
BASOPHILS # BLD: 0 K/UL (ref 0–0.1)
BASOPHILS NFR BLD: 0 % (ref 0–2)
BUN SERPL-MCNC: 50 MG/DL (ref 9–21)
BUN/CREAT SERPL: 42
CA-I BLD-MCNC: 7.7 MG/DL (ref 8.5–10.5)
CHLORIDE SERPL-SCNC: 118 MMOL/L (ref 94–111)
CO2 SERPL-SCNC: 18 MMOL/L (ref 21–33)
CREAT SERPL-MCNC: 1.2 MG/DL (ref 0.8–1.5)
DIFFERENTIAL METHOD BLD: ABNORMAL
EOSINOPHIL # BLD: 0 K/UL (ref 0–0.4)
EOSINOPHIL NFR BLD: 0 % (ref 0–5)
ERYTHROCYTE [DISTWIDTH] IN BLOOD BY AUTOMATED COUNT: 14.8 % (ref 11.6–14.5)
GLUCOSE SERPL-MCNC: 132 MG/DL (ref 70–110)
HCT VFR BLD AUTO: 30 % (ref 36–48)
HGB BLD-MCNC: 10.4 G/DL (ref 13–16)
IMM GRANULOCYTES # BLD AUTO: 0 K/UL
IMM GRANULOCYTES NFR BLD AUTO: 0 %
LYMPHOCYTES # BLD: 2.2 K/UL (ref 0.9–3.6)
LYMPHOCYTES NFR BLD: 7 % (ref 21–52)
MAGNESIUM SERPL-MCNC: 2 MG/DL (ref 1.7–2.8)
MCH RBC QN AUTO: 29.4 PG (ref 24–34)
MCHC RBC AUTO-ENTMCNC: 34.7 G/DL (ref 31–37)
MCV RBC AUTO: 84.7 FL (ref 78–100)
MONOCYTES # BLD: 2.2 K/UL (ref 0.05–1.2)
MONOCYTES NFR BLD: 7 % (ref 3–10)
NEUTS SEG # BLD: 27.2 K/UL (ref 1.8–8)
NEUTS SEG NFR BLD: 86 % (ref 40–73)
NRBC # BLD: 0 K/UL (ref 0–0.01)
NRBC BLD-RTO: 0 PER 100 WBC
PHOSPHATE SERPL-MCNC: 2.3 MG/DL (ref 2.5–4.5)
PLATELET # BLD AUTO: 561 K/UL (ref 135–420)
PMV BLD AUTO: 11.4 FL (ref 9.2–11.8)
POTASSIUM SERPL-SCNC: 4 MMOL/L (ref 3.2–5.1)
RBC # BLD AUTO: 3.54 M/UL (ref 4.35–5.65)
RBC MORPH BLD: ABNORMAL
SODIUM SERPL-SCNC: 143 MMOL/L (ref 135–145)
WBC # BLD AUTO: 31.6 K/UL (ref 4.6–13.2)

## 2021-10-02 PROCEDURE — 84100 ASSAY OF PHOSPHORUS: CPT

## 2021-10-02 PROCEDURE — 83735 ASSAY OF MAGNESIUM: CPT

## 2021-10-02 PROCEDURE — 65270000029 HC RM PRIVATE

## 2021-10-02 PROCEDURE — 74011250637 HC RX REV CODE- 250/637: Performed by: NURSE PRACTITIONER

## 2021-10-02 PROCEDURE — 36415 COLL VENOUS BLD VENIPUNCTURE: CPT

## 2021-10-02 PROCEDURE — 74011250637 HC RX REV CODE- 250/637: Performed by: INTERNAL MEDICINE

## 2021-10-02 PROCEDURE — 80048 BASIC METABOLIC PNL TOTAL CA: CPT

## 2021-10-02 PROCEDURE — 85025 COMPLETE CBC W/AUTO DIFF WBC: CPT

## 2021-10-02 RX ADMIN — VANCOMYCIN HYDROCHLORIDE 250 MG: KIT at 05:55

## 2021-10-02 RX ADMIN — Medication 1000 UNITS: at 10:21

## 2021-10-02 RX ADMIN — CYANOCOBALAMIN TAB 500 MCG 250 MCG: 500 TAB at 10:22

## 2021-10-02 RX ADMIN — Medication 10 ML: at 05:56

## 2021-10-02 RX ADMIN — ATORVASTATIN CALCIUM 10 MG: 10 TABLET, FILM COATED ORAL at 21:20

## 2021-10-02 RX ADMIN — VANCOMYCIN HYDROCHLORIDE 250 MG: KIT at 11:30

## 2021-10-02 RX ADMIN — VANCOMYCIN HYDROCHLORIDE 250 MG: KIT at 00:21

## 2021-10-02 RX ADMIN — VANCOMYCIN HYDROCHLORIDE 250 MG: KIT at 18:08

## 2021-10-02 RX ADMIN — ASPIRIN 81 MG: 81 TABLET, COATED ORAL at 10:22

## 2021-10-02 RX ADMIN — ACETAMINOPHEN 650 MG: 325 TABLET ORAL at 05:55

## 2021-10-02 NOTE — PROGRESS NOTES
Hospitalist Progress Note             Date of Service:  10/2/2021  NAME:  Shannan Salazar  :  1940  MRN:  [id number]    Assessment & Plan:         Sepsis due to cdif colitis  Dc Levaquin and cefepime   Cont ivf  - cont po vanco  - pt still with loose stools,  - unclear why had increase in wbc today, cont to trend     Acute kidney injury ([facility]  Acute kidney injury related to sepsis dehydration  - cut fluids back to 50cc/hr, with dextrose  Trend cr, 4.6 to 3.2 to 1.9, to 1.2  - another day ivf, then if he is taking po well, we can stop and consider dc         Elevated troponin  Troponin elevated suspected from VLADIMIR  We will repeat in the morning  no chest pain was reported  ACS or NSTEMI not suspected at this time     Urethral bleeding  - james changed out, holding heparin     Failure to thrive in adult  Patient made DNR status after conversation with wife  Antibiotics and fluid will be continued        Hospital Problems  Date Reviewed: 2017        Codes Class Noted POA    Failure to thrive in adult ICD-10-CM: R62.7  ICD-9-CM: 783.7  2021 Unknown        Severe protein-calorie malnutrition ([facility] ICD-10-CM: E43  ICD-9-CM: 262  2021 Unknown        Acute kidney injury ([facility] ICD-10-CM: N17.9  ICD-9-CM: 584.9  2021 Unknown        Elevated troponin ICD-10-CM: R77.8  ICD-9-CM: 790.6  2021 Unknown        Sepsis due to urinary tract infection ([facility] ICD-10-CM: A41.9, N39.0  ICD-9-CM: 038.9, 995.91, 599.0  2021 Unknown                Review of Systems:   A comprehensive review of systems was negative except for that written in the HPI. Vital Signs:    Last 24hrs VS reviewed since prior progress note.  Most recent are:  Visit Vitals  /68 (BP 1 Location: Right upper arm, BP Patient Position: At rest)   Pulse (!) 56   Temp 98.2 °F (36.8 °C)   Resp 18   Ht 5' 6\" (1.676 m)   Wt 53.5 kg (118 lb) SpO2 98%   BMI 19.05 kg/m²         Intake/Output Summary (Last 24 hours) at 10/2/2021 0902  Last data filed at 10/2/2021 0556  Gross per 24 hour   Intake --   Output 600 ml   Net -600 ml        Physical Examination:             General:          Alert, cooperative, no distress, appears stated age. HEENT:           Atraumatic, anicteric sclerae, pink conjunctivae                          No oral ulcers, mucosa moist, throat clear, dentition fair  Neck:               Supple, symmetrical  Lungs:             Clear to auscultation bilaterally. No Wheezing or Rhonchi. No rales. Chest wall:      No tenderness  No Accessory muscle use. Heart:              Regular  rhythm,  No  murmur   No edema  Abdomen:        Soft, non-tender. Not distended. Bowel sounds normal  Extremities:     No cyanosis. No clubbing,                            Skin turgor normal, Capillary refill normal  Skin:                Not pale. Not Jaundiced  No rashes   Psych:             Not anxious or agitated. Neurologic:      Alert, moves all extremities, answers questions appropriately and responds to commands + dementia       Data Review:    Review and/or order of clinical lab test  Review and/or order of tests in the radiology section of CPT  Review and/or order of tests in the medicine section of CPT      Labs:     Recent Labs     10/02/21  0410 10/01/21  0500   WBC 31.6* 28.9*   HGB 10.4* 9.0*   HCT 30.0* 26.1*   * 500*     Recent Labs     10/02/21  0410 10/01/21  0500 09/30/21  0427    144 139   K 4.0 3.7 3.7   * 117* 109   CO2 18* 17* 19*   BUN 50* 60* 70*   CREA 1.20 1.90* 3.20*   * 52* 85   CA 7.7* 7.3* 7.0*   MG 2.0 1.9 1.6*   PHOS 2.3* 3.1  --      Recent Labs     09/30/21  0427 09/29/21  1650   ALT 20 19   AP 69 90   TBILI 0.5 0.5   TP 4.8* 5.6*   ALB 1.9* 2.2*   GLOB 2.9 3.4     No results for input(s): INR, PTP, APTT, INREXT in the last 72 hours.    No results for input(s): FE, TIBC, PSAT, FERR in the last 72 hours. No results found for: FOL, RBCF   No results for input(s): PH, PCO2, PO2 in the last 72 hours.   Recent Labs     10/01/21  0500 09/30/21  1000 09/30/21  0427 09/29/21 2121   *  --   --   --    TROIQ  --  0.47* 0.51* 0.35*     No results found for: CHOL, CHOLX, CHLST, CHOLV, HDL, HDLP, LDL, LDLC, DLDLP, TGLX, TRIGL, TRIGP, CHHD, CHHDX  Lab Results   Component Value Date/Time    Glucose (POC) 154 (H) 09/04/2021 12:00 PM    Glucose (POC) 126 (H) 09/04/2021 08:03 AM    Glucose (POC) 177 (H) 09/03/2021 09:03 PM    Glucose (POC) 152 (H) 09/03/2021 04:00 PM    Glucose (POC) 169 (H) 09/03/2021 10:54 AM     Lab Results   Component Value Date/Time    Color Dark Yellow 09/29/2021 07:24 PM    Appearance Turbid 09/29/2021 07:24 PM    Specific gravity 1.020 09/29/2021 07:24 PM    pH (UA) 5.0 09/29/2021 07:24 PM    Protein 100 (A) 09/29/2021 07:24 PM    Glucose Negative 09/29/2021 07:24 PM    Ketone Negative 09/29/2021 07:24 PM    Bilirubin Small (A) 09/29/2021 07:24 PM    Urobilinogen 1.0 09/29/2021 07:24 PM    Nitrites Negative 09/29/2021 07:24 PM    Leukocyte Esterase Small (A) 09/29/2021 07:24 PM    Epithelial cells Few 09/29/2021 07:24 PM    Bacteria 3+ (A) 09/29/2021 07:24 PM    WBC 5-10 09/29/2021 07:24 PM    RBC 20-50 09/29/2021 07:24 PM         Medications Reviewed:     Current Facility-Administered Medications   Medication Dose Route Frequency    dextrose 5% - 0.45% NaCl with KCl 20 mEq/L infusion  75 mL/hr IntraVENous CONTINUOUS    loperamide (IMODIUM) capsule 2 mg  2 mg Oral Q4H PRN    vancomycin (FIRVANQ) 50 mg/mL oral solution 250 mg  250 mg Oral Q6H    aspirin delayed-release tablet 81 mg  81 mg Oral DAILY    atorvastatin (LIPITOR) tablet 10 mg  10 mg Oral QHS    cyanocobalamin (VITAMIN B12) tablet 250 mcg  250 mcg Oral DAILY    cholecalciferol (VITAMIN D3) (1000 Units /25 mcg) tablet 1,000 Units  1,000 Units Oral DAILY    sodium chloride (NS) flush 5-10 mL  5-10 mL IntraVENous PRN  sodium chloride (NS) flush 5-40 mL  5-40 mL IntraVENous Q8H    sodium chloride (NS) flush 5-40 mL  5-40 mL IntraVENous PRN    acetaminophen (TYLENOL) tablet 650 mg  650 mg Oral Q6H PRN    Or    acetaminophen (TYLENOL) suppository 650 mg  650 mg Rectal Q6H PRN    polyethylene glycol (MIRALAX) packet 17 g  17 g Oral DAILY PRN    ondansetron (ZOFRAN ODT) tablet 4 mg  4 mg Oral Q8H PRN    Or    ondansetron (ZOFRAN) injection 4 mg  4 mg IntraVENous Q6H PRN    [Held by provider] losartan (COZAAR) tablet 100 mg  100 mg Oral DAILY    LORazepam (ATIVAN) injection 1 mg  1 mg IntraVENous Q6H PRN     ______________________________________________________________________  EXPECTED LENGTH OF STAY: 3d 12h  ACTUAL LENGTH OF STAY:          3                 Edin Barreto MD

## 2021-10-02 NOTE — PROGRESS NOTES
Renal Progress Note    Patient: Isaka Galeano MRN: [id number]  SSN: [id number]    YOB: 1940  Age: [de-identified] y.o. Sex: male      Admit Date: 9/29/2021    LOS: 2 days     Subjective:   Patient seen at bedside. Alert and awake, no acute distress. not giving any new complaints today.    Confusion+  Improving renal functions with IV fluids, creatinine down to 1.9    Current Facility-Administered Medications   Medication Dose Route Frequency    dextrose 5% - 0.45% NaCl with KCl 20 mEq/L infusion  75 mL/hr IntraVENous CONTINUOUS    loperamide (IMODIUM) capsule 2 mg  2 mg Oral Q4H PRN    vancomycin (FIRVANQ) 50 mg/mL oral solution 250 mg  250 mg Oral Q6H    aspirin delayed-release tablet 81 mg  81 mg Oral DAILY    atorvastatin (LIPITOR) tablet 10 mg  10 mg Oral QHS    cyanocobalamin (VITAMIN B12) tablet 250 mcg  250 mcg Oral DAILY    cholecalciferol (VITAMIN D3) (1000 Units /25 mcg) tablet 1,000 Units  1,000 Units Oral DAILY    sodium chloride (NS) flush 5-10 mL  5-10 mL IntraVENous PRN    sodium chloride (NS) flush 5-40 mL  5-40 mL IntraVENous Q8H    sodium chloride (NS) flush 5-40 mL  5-40 mL IntraVENous PRN    acetaminophen (TYLENOL) tablet 650 mg  650 mg Oral Q6H PRN    Or    acetaminophen (TYLENOL) suppository 650 mg  650 mg Rectal Q6H PRN    polyethylene glycol (MIRALAX) packet 17 g  17 g Oral DAILY PRN    ondansetron (ZOFRAN ODT) tablet 4 mg  4 mg Oral Q8H PRN    Or    ondansetron (ZOFRAN) injection 4 mg  4 mg IntraVENous Q6H PRN    [Held by provider] losartan (COZAAR) tablet 100 mg  100 mg Oral DAILY    LORazepam (ATIVAN) injection 1 mg  1 mg IntraVENous Q6H PRN        Vitals:    10/01/21 0800 10/01/21 1200 10/01/21 1600 10/01/21 2000   BP: (!) 131/58 (!) 133/48 (!) 127/56 (!) 117/57   Pulse: (!) 58 (!) 57 (!) 57 (!) 57   Resp: 16 17 16 18   Temp: 97 °F (36.1 °C) 97.2 °F (36.2 °C) 97.6 °F (36.4 °C) 97.6 °F (36.4 °C)   SpO2: 94% 95% 96% 96%   Weight:       Height:         Objective: General: alert awake , no acute distress. Confusion+  HEENT: EOMI, no Icterus, no Pallor, mucosa moist  Neck: Neck is supple, No JVD,   Lungs: breathsounds normal, no respiratory distress on inspection, no rhonchi, no rales,  CVS: heart sounds normal,  no murmurs, no rubs. GI: soft, nontender, normal BS,   Extremeties: no cyanosis, no edema,  Neuro: Alert, awake, answering simple questions but confused, moving all extremeties   Skin: improved skin turgor, no skin rashes   Musculoskeletal: no acute joint swellings     Intake and Output:  Current Shift: No intake/output data recorded. Last three shifts: 09/30 0701 - 10/01 1900  In: 100 [P.O.:100]  Out: 1200 [Urine:1200]      Lab/Data Review:  Recent Labs     10/01/21  0500 09/30/21 0427 09/29/21  1650   WBC 28.9* 37.5* 49.2*   HGB 9.0* 9.8* 10.4*   HCT 26.1* 28.3* 29.3*   * 467* 506*     Recent Labs     10/01/21  0500 09/30/21  0427 09/29/21  1650    139 136   K 3.7 3.7 3.7   * 109 98   CO2 17* 19* 22   GLU 52* 85 132*   BUN 60* 70* 77*   CREA 1.90* 3.20* 4.60*   CA 7.3* 7.0* 7.7*   MG 1.9 1.6*  --    PHOS 3.1  --   --    ALB  --  1.9* 2.2*   TBILI  --  0.5 0.5   ALT  --  20 19     No results for input(s): PH, PCO2, PO2, HCO3, FIO2 in the last 72 hours. Recent Results (from the past 24 hour(s))   CBC WITH AUTOMATED DIFF    Collection Time: 10/01/21  5:00 AM   Result Value Ref Range    WBC 28.9 (H) 4.6 - 13.2 K/uL    RBC 3.07 (L) 4.35 - 5.65 M/uL    HGB 9.0 (L) 13.0 - 16.0 g/dL    HCT 26.1 (L) 36.0 - 48.0 %    MCV 85.0 78.0 - 100.0 FL    MCH 29.3 24.0 - 34.0 PG    MCHC 34.5 31.0 - 37.0 g/dL    RDW 14.7 (H) 11.6 - 14.5 %    PLATELET 550 (H) 104 - 420 K/uL    MPV 11.7 9.2 - 11.8 FL    NRBC 0.0 0.0  WBC    ABSOLUTE NRBC 0.00 0.00 - 0.01 K/uL    NEUTROPHILS 86 (H) 40 - 73 %    LYMPHOCYTES 9 (L) 21 - 52 %    MONOCYTES 5 3 - 10 %    EOSINOPHILS 0 0 - 5 %    BASOPHILS 0 0 - 2 %    IMMATURE GRANULOCYTES 0 %    ABS.  NEUTROPHILS 24.9 (H) 1.8 - 8.0 K/UL    ABS. LYMPHOCYTES 2.6 0.9 - 3.6 K/UL    ABS. MONOCYTES 1.4 (H) 0.05 - 1.2 K/UL    ABS. EOSINOPHILS 0.0 0.0 - 0.4 K/UL    ABS. BASOPHILS 0.0 0.0 - 0.1 K/UL    ABS. IMM. GRANS. 0.0 K/UL    DF AUTOMATED      RBC COMMENTS Normocytic, Normochromic     MAGNESIUM    Collection Time: 10/01/21  5:00 AM   Result Value Ref Range    Magnesium 1.9 1.7 - 2.8 mg/dL   METABOLIC PANEL, BASIC    Collection Time: 10/01/21  5:00 AM   Result Value Ref Range    Sodium 144 135 - 145 mmol/L    Potassium 3.7 3.2 - 5.1 mmol/L    Chloride 117 (H) 94 - 111 mmol/L    CO2 17 (L) 21 - 33 mmol/L    Anion gap 10 mmol/L    Glucose 52 (LL) 70 - 110 mg/dL    BUN 60 (H) 9 - 21 mg/dL    Creatinine 1.90 (H) 0.8 - 1.50 mg/dL    BUN/Creatinine ratio 32      GFR est AA 42 ml/min/1.73m2    GFR est non-AA 34 ml/min/1.73m2    Calcium 7.3 (L) 8.5 - 10.5 mg/dL   PHOSPHORUS    Collection Time: 10/01/21  5:00 AM   Result Value Ref Range    Phosphorus 3.1 2.5 - 4.5 mg/dL   CK    Collection Time: 10/01/21  5:00 AM   Result Value Ref Range     (H) 38 - 174 U/L        Assessment and Plan:     1. Acute Kidney Injury : prerenal azotemia secondary to dehydration/ use of Losartan  discontinued losartan  improving renal functions with IVF, creatinine down from 4.6-3.2--1.9  Continue IV fluids and continue to monitor renal function   no history of any chronic kidney disease     2. Bleeding at the site of Miles catheter  Resolved now, stable hemoglobin     3. Chronic Miles catheter drainage: Probably had urinary retention or incontinence with hypotonic bladder or BPH  Continue Miles drainage and continue to monitor     4. Diarrhea/C. difficile colitis:-Leukocytosis+  Started on p.o. vancomycin    5. Hypertension: Stable  Losartan on hold  Continue to monitor blood pressures     6.   Type 2 diabetes: Stable blood sugars, continue to monitor Accu-Cheks       Signed By: Erik Aguilar MD     October 1, 2021

## 2021-10-02 NOTE — PROGRESS NOTES
Perineal care provided for incontinence of urine and loose, mucous, watery stool. Patient turned and repositioned. Wound and catheter care complete. PRN Tylenol administered for leg pain. CBWR. Fresh water within reach.

## 2021-10-03 LAB
ANION GAP SERPL CALC-SCNC: 8 MMOL/L
APPEARANCE UR: ABNORMAL
BACTERIA SPEC CULT: ABNORMAL
BACTERIA URNS QL MICRO: ABNORMAL /HPF
BASOPHILS # BLD: 0 K/UL (ref 0–0.1)
BASOPHILS NFR BLD: 0 % (ref 0–2)
BILIRUB UR QL: NEGATIVE
BUN SERPL-MCNC: 40 MG/DL (ref 9–21)
BUN/CREAT SERPL: 44
CA-I BLD-MCNC: 7.8 MG/DL (ref 8.5–10.5)
CHLORIDE SERPL-SCNC: 120 MMOL/L (ref 94–111)
CO2 SERPL-SCNC: 18 MMOL/L (ref 21–33)
COLONY COUNT,CNT: ABNORMAL
COLOR UR: ABNORMAL
CREAT SERPL-MCNC: 0.9 MG/DL (ref 0.8–1.5)
DIFFERENTIAL METHOD BLD: ABNORMAL
EOSINOPHIL # BLD: 0.4 K/UL (ref 0–0.4)
EOSINOPHIL NFR BLD: 1 % (ref 0–5)
EPITH CASTS URNS QL MICRO: ABNORMAL /LPF (ref 0–20)
ERYTHROCYTE [DISTWIDTH] IN BLOOD BY AUTOMATED COUNT: 15.2 % (ref 11.6–14.5)
GLUCOSE SERPL-MCNC: 141 MG/DL (ref 70–110)
GLUCOSE UR STRIP.AUTO-MCNC: NEGATIVE MG/DL
HCT VFR BLD AUTO: 29.4 % (ref 36–48)
HGB BLD-MCNC: 10.3 G/DL (ref 13–16)
HGB UR QL STRIP: ABNORMAL
IMM GRANULOCYTES # BLD AUTO: 0 K/UL
IMM GRANULOCYTES NFR BLD AUTO: 0 %
KETONES UR QL STRIP.AUTO: NEGATIVE MG/DL
LEUKOCYTE ESTERASE UR QL STRIP.AUTO: ABNORMAL
LYMPHOCYTES # BLD: 3.2 K/UL (ref 0.9–3.6)
LYMPHOCYTES NFR BLD: 9 % (ref 21–52)
MAGNESIUM SERPL-MCNC: 1.9 MG/DL (ref 1.7–2.8)
MCH RBC QN AUTO: 29.6 PG (ref 24–34)
MCHC RBC AUTO-ENTMCNC: 35 G/DL (ref 31–37)
MCV RBC AUTO: 84.5 FL (ref 78–100)
METAMYELOCYTES NFR BLD MANUAL: 1 %
MONOCYTES # BLD: 2.5 K/UL (ref 0.05–1.2)
MONOCYTES NFR BLD: 7 % (ref 3–10)
MYELOCYTES NFR BLD MANUAL: 1 %
NEUTS SEG # BLD: 28.4 K/UL (ref 1.8–8)
NEUTS SEG NFR BLD: 81 % (ref 40–73)
NITRITE UR QL STRIP.AUTO: NEGATIVE
NRBC # BLD: 0 K/UL (ref 0–0.01)
NRBC BLD-RTO: 0 PER 100 WBC
PH UR STRIP: 5 [PH] (ref 5–8)
PHOSPHATE SERPL-MCNC: 1.9 MG/DL (ref 2.5–4.5)
PLATELET # BLD AUTO: 551 K/UL (ref 135–420)
PLATELET COMMENTS,PCOM: ABNORMAL
PMV BLD AUTO: 11.4 FL (ref 9.2–11.8)
POTASSIUM SERPL-SCNC: 4.4 MMOL/L (ref 3.2–5.1)
PROT UR STRIP-MCNC: 100 MG/DL
RBC # BLD AUTO: 3.48 M/UL (ref 4.35–5.65)
RBC #/AREA URNS HPF: ABNORMAL /HPF (ref 0–2)
RBC MORPH BLD: ABNORMAL
RBC MORPH BLD: ABNORMAL
SODIUM SERPL-SCNC: 146 MMOL/L (ref 135–145)
SP GR UR REFRACTOMETRY: 1.02 (ref 1–1.03)
SPECIAL REQUESTS,SREQ: ABNORMAL
UA: UC IF INDICATED,UAUC: ABNORMAL
UROBILINOGEN UR QL STRIP.AUTO: 0.2 EU/DL (ref 0.2–1)
WBC # BLD AUTO: 35 K/UL (ref 4.6–13.2)
WBC MORPH BLD: ABNORMAL
WBC URNS QL MICRO: ABNORMAL /HPF (ref 0–4)
YEAST URNS QL MICRO: PRESENT

## 2021-10-03 PROCEDURE — 74011250636 HC RX REV CODE- 250/636: Performed by: INTERNAL MEDICINE

## 2021-10-03 PROCEDURE — 74011250637 HC RX REV CODE- 250/637: Performed by: INTERNAL MEDICINE

## 2021-10-03 PROCEDURE — 80048 BASIC METABOLIC PNL TOTAL CA: CPT

## 2021-10-03 PROCEDURE — 74011250637 HC RX REV CODE- 250/637: Performed by: NURSE PRACTITIONER

## 2021-10-03 PROCEDURE — 83735 ASSAY OF MAGNESIUM: CPT

## 2021-10-03 PROCEDURE — 84100 ASSAY OF PHOSPHORUS: CPT

## 2021-10-03 PROCEDURE — 36415 COLL VENOUS BLD VENIPUNCTURE: CPT

## 2021-10-03 PROCEDURE — 85025 COMPLETE CBC W/AUTO DIFF WBC: CPT

## 2021-10-03 PROCEDURE — 65270000029 HC RM PRIVATE

## 2021-10-03 PROCEDURE — 81001 URINALYSIS AUTO W/SCOPE: CPT

## 2021-10-03 PROCEDURE — 87086 URINE CULTURE/COLONY COUNT: CPT

## 2021-10-03 PROCEDURE — 87106 FUNGI IDENTIFICATION YEAST: CPT

## 2021-10-03 RX ADMIN — Medication 1000 UNITS: at 09:08

## 2021-10-03 RX ADMIN — ASPIRIN 81 MG: 81 TABLET, COATED ORAL at 09:08

## 2021-10-03 RX ADMIN — ATORVASTATIN CALCIUM 10 MG: 10 TABLET, FILM COATED ORAL at 21:35

## 2021-10-03 RX ADMIN — VANCOMYCIN HYDROCHLORIDE 250 MG: KIT at 11:45

## 2021-10-03 RX ADMIN — VANCOMYCIN HYDROCHLORIDE 250 MG: KIT at 23:44

## 2021-10-03 RX ADMIN — CYANOCOBALAMIN TAB 500 MCG 250 MCG: 500 TAB at 09:08

## 2021-10-03 RX ADMIN — VANCOMYCIN HYDROCHLORIDE 250 MG: KIT at 17:25

## 2021-10-03 RX ADMIN — VANCOMYCIN HYDROCHLORIDE 250 MG: KIT at 06:14

## 2021-10-03 RX ADMIN — VANCOMYCIN HYDROCHLORIDE 250 MG: KIT at 00:40

## 2021-10-03 RX ADMIN — Medication 10 ML: at 21:36

## 2021-10-03 RX ADMIN — POTASSIUM CHLORIDE, DEXTROSE MONOHYDRATE AND SODIUM CHLORIDE 50 ML/HR: 150; 5; 450 INJECTION, SOLUTION INTRAVENOUS at 06:14

## 2021-10-03 RX ADMIN — Medication 10 ML: at 17:25

## 2021-10-03 RX ADMIN — VANCOMYCIN HYDROCHLORIDE 1000 MG: 1 INJECTION, POWDER, LYOPHILIZED, FOR SOLUTION INTRAVENOUS at 09:47

## 2021-10-03 NOTE — PROGRESS NOTES
1900 - Assumed care of pt, shift report given    2020 - VSS. Pt resting comfortably in bed, IVF infusing without issue. Assessment completed. Brief clean and dry. 2110 - HS medication given, pt tolerated well. Pt drank 1 cup water    0109 - Scheduled vanc given. Wound care completed to BLE. Cleaned pt of incontinent episode of loose stool, barrier cream applied. Placed pillow under left hip for pressure reduction. New gown and blankets given. 0416 - VSS. Cleaned pt of incontinent episode of stool. 0240 - New bag IVF hung per orders. Scheduled vanc given. Brief clean and dry. Weight obtained. Repositioned for pressure reduction and comfort. Miles emptied. Fresh ice water given.  CBWR

## 2021-10-03 NOTE — PROGRESS NOTES
Hospitalist Progress Note             Date of Service:  10/3/2021  NAME:  Shakira Raya  :  1940  MRN:  [id number]    Assessment & Plan:         Sepsis due to cdif colitis  Dc Levaquin and cefepime   Cont ivf  - cont po vanco  - pt still with loose stools,  - wbc continues to increase, see uti    UTI  - e. feacalis growing, had hesitated to treat, as no urinary complaints, but with james, this is a james associtated uti, and his wbc cont to increase  - he has had his james changed out on admission  - start iv vanco, shouldn't cross into bowels to worsen cdif, watch for sensitivy to choose oral option, use least possible duration due to cdif       Acute kidney injury ([facility] [address]  Acute kidney injury related to sepsis dehydration  - dc ivf for this indication, cr has returned to wnl         Elevated troponin  Troponin elevated suspected from VLADIMIR  We will repeat in the morning  no chest pain was reported  ACS or NSTEMI not suspected at this time     Urethral bleeding  - james changed out, holding heparin     Failure to thrive in adult  Patient made DNR status after conversation with wife  Antibiotics and fluid will be continued        Hospital Problems  Date Reviewed: 2017        Codes Class Noted POA    Failure to thrive in adult ICD-10-CM: R62.7  ICD-9-CM: 783.7  2021 Unknown        Severe protein-calorie malnutrition ([facility] [address] ICD-10-CM: E43  ICD-9-CM: 262  2021 Unknown        Acute kidney injury ([facility] [address] ICD-10-CM: N17.9  ICD-9-CM: 584.9  2021 Unknown        Elevated troponin ICD-10-CM: R77.8  ICD-9-CM: 790.6  2021 Unknown        Sepsis due to urinary tract infection ([facility] [address] ICD-10-CM: A41.9, N39.0  ICD-9-CM: 038.9, 995.91, 599.0  2021 Unknown                Review of Systems:   A comprehensive review of systems was negative except for that written in the HPI.        Vital Signs:    Last 24hrs VS reviewed since prior progress note. Most recent are:  Visit Vitals  BP (!) 136/56   Pulse (!) 58   Temp 98 °F (36.7 °C)   Resp 16   Ht 5' 6\" (1.676 m)   Wt 54.3 kg (119 lb 12.8 oz)   SpO2 95%   BMI 19.34 kg/m²         Intake/Output Summary (Last 24 hours) at 10/3/2021 0946  Last data filed at 10/3/2021 0615  Gross per 24 hour   Intake 1080 ml   Output 1100 ml   Net -20 ml        Physical Examination:       General:          Alert, cooperative, no distress, appears stated age. [place]  HEENT:           Atraumatic, anicteric sclerae, pink conjunctivae                          No oral ulcers, mucosa moist, throat clear, dentition fair  Neck:               Supple, symmetrical  Lungs:             Clear to auscultation bilaterally.  No Wheezing or Rhonchi. No rales. Chest wall:      No tenderness  No Accessory muscle use. Heart:              Regular  rhythm,  No  murmur   No edema  Abdomen:        Soft, non-tender. Not distended.  Bowel sounds normal  Extremities:     No cyanosis.  No clubbing,                            Skin turgor normal, Capillary refill normal  Skin:                Not pale.  Not Jaundiced  No rashes   Psych:             Not anxious or agitated.   Neurologic:      Alert, moves all extremities, answers questions appropriately and responds to commands + dementia       Data Review:    Review and/or order of clinical lab test  Review and/or order of tests in the radiology section of CPT  Review and/or order of tests in the medicine section of CPT      Labs:     Recent Labs     10/03/21  0300 10/02/21  0410   WBC 35.0* 31.6*   HGB 10.3* 10.4*   HCT 29.4* 30.0*   * 561*     Recent Labs     10/03/21  0300 10/02/21  0410 10/01/21  0500   * 143 144   K 4.4 4.0 3.7   * 118* 117*   CO2 18* 18* 17*   BUN 40* 50* 60*   CREA 0.90 1.20 1.90*   * 132* 52*   CA 7.8* 7.7* 7.3*   MG 1.9 2.0 1.9   PHOS 1.9* 2.3* 3.1     No results for input(s): ALT, AP, TBIL, TBILI, TP, ALB, GLOB, GGT, AML, LPSE in the last 72 hours. No lab exists for component: SGOT, GPT, AMYP, HLPSE  No results for input(s): INR, PTP, APTT, INREXT in the last 72 hours. No results for input(s): FE, TIBC, PSAT, FERR in the last 72 hours. No results found for: FOL, RBCF   No results for input(s): PH, PCO2, PO2 in the last 72 hours.   Recent Labs     10/01/21  0500 09/30/21  1000   *  --    TROIQ  --  0.47*     No results found for: CHOL, CHOLX, CHLST, CHOLV, HDL, HDLP, LDL, LDLC, DLDLP, TGLX, TRIGL, TRIGP, CHHD, CHHDX  Lab Results   Component Value Date/Time    Glucose (POC) 154 (H) 09/04/2021 12:00 PM    Glucose (POC) 126 (H) 09/04/2021 08:03 AM    Glucose (POC) 177 (H) 09/03/2021 09:03 PM    Glucose (POC) 152 (H) 09/03/2021 04:00 PM    Glucose (POC) 169 (H) 09/03/2021 10:54 AM     Lab Results   Component Value Date/Time    Color Dark Yellow 09/29/2021 07:24 PM    Appearance Turbid 09/29/2021 07:24 PM    Specific gravity 1.020 09/29/2021 07:24 PM    pH (UA) 5.0 09/29/2021 07:24 PM    Protein 100 (A) 09/29/2021 07:24 PM    Glucose Negative 09/29/2021 07:24 PM    Ketone Negative 09/29/2021 07:24 PM    Bilirubin Small (A) 09/29/2021 07:24 PM    Urobilinogen 1.0 09/29/2021 07:24 PM    Nitrites Negative 09/29/2021 07:24 PM    Leukocyte Esterase Small (A) 09/29/2021 07:24 PM    Epithelial cells Few 09/29/2021 07:24 PM    Bacteria 3+ (A) 09/29/2021 07:24 PM    WBC 5-10 09/29/2021 07:24 PM    RBC 20-50 09/29/2021 07:24 PM         Medications Reviewed:     Current Facility-Administered Medications   Medication Dose Route Frequency    vancomycin (VANCOCIN) 1,000 mg in 0.9% sodium chloride 250 mL (VIAL-MATE)  1,000 mg IntraVENous Q24H    [START ON 10/6/2021] VANCOMYCIN INFORMATION NOTE   Other ONCE    VANCOMYCIN INFORMATION NOTE 1 Each  1 Each Other Rx Dosing/Monitoring    dextrose 5% - 0.45% NaCl with KCl 20 mEq/L infusion  50 mL/hr IntraVENous CONTINUOUS    loperamide (IMODIUM) capsule 2 mg  2 mg Oral Q4H PRN    vancomycin [facility]) 50 mg/mL oral solution 250 mg  250 mg Oral Q6H    aspirin delayed-release tablet 81 mg  81 mg Oral DAILY    atorvastatin (LIPITOR) tablet 10 mg  10 mg Oral QHS    cyanocobalamin (VITAMIN B12) tablet 250 mcg  250 mcg Oral DAILY    cholecalciferol (VITAMIN D3) (1000 Units /25 mcg) tablet 1,000 Units  1,000 Units Oral DAILY    sodium chloride (NS) flush 5-10 mL  5-10 mL IntraVENous PRN    sodium chloride (NS) flush 5-40 mL  5-40 mL IntraVENous Q8H    sodium chloride (NS) flush 5-40 mL  5-40 mL IntraVENous PRN    acetaminophen (TYLENOL) tablet 650 mg  650 mg Oral Q6H PRN    Or    acetaminophen (TYLENOL) suppository 650 mg  650 mg Rectal Q6H PRN    polyethylene glycol (MIRALAX) packet 17 g  17 g Oral DAILY PRN    ondansetron (ZOFRAN ODT) tablet 4 mg  4 mg Oral Q8H PRN    Or    ondansetron (ZOFRAN) injection 4 mg  4 mg IntraVENous Q6H PRN    [Held by provider] losartan (COZAAR) tablet 100 mg  100 mg Oral DAILY    LORazepam (ATIVAN) injection 1 mg  1 mg IntraVENous Q6H PRN     ______________________________________________________________________  EXPECTED LENGTH OF STAY: 3d 12h  ACTUAL LENGTH OF STAY:          4                 Edin Barreto MD

## 2021-10-03 NOTE — PROGRESS NOTES
Comprehensive Nutrition Assessment    Type and Reason for Visit: reassessment    Nutrition Recommendations/Plan: regular diet to encourage po intake if BG is > 150 then recommend change to 4 CHO choice meals  Provide glucerna BID and gelatein 20 with lunch trays    Nutrition Assessment:  [de-identified] yo male PMH: benign neoplasm of colon, depression, DM, HTN, HLD, dementia   underweight BMI 20.9 for age of 74 yo or >. admitted due to sepsis 2/2 UTI and + C-diff. Consulted for failure to thrive pt is thin and bedbound. pressure injury to right heel. Albumin low 2.2  Being treated with IVF and IV ABx for UTI, C-diff starting PO Vancomycin. For additional protein calories trying glucerna BID and gelatein 20 daily but due to dementia PO intake may vary or not improve. Currently on regular diet to encourage PO intake and currently pt BG is well controlled. If BG becomes elevated > 150 then can change to CHO restricted diet. 10/3/2021 Hospital day 4. Pt DNR plan is home vs TLC hospice. Per RN pt is only nibbling on meals and eats ~25% but is drinking 100% of glucerna. . Continues to have loose stools related to C-diff receiving vancomycin for C-diff and UTI. Recent Results (from the past 24 hour(s))   CBC WITH AUTOMATED DIFF    Collection Time: 10/03/21  3:00 AM   Result Value Ref Range    WBC 35.0 (H) 4.6 - 13.2 K/uL    RBC 3.48 (L) 4.35 - 5.65 M/uL    HGB 10.3 (L) 13.0 - 16.0 g/dL    HCT 29.4 (L) 36.0 - 48.0 %    MCV 84.5 78.0 - 100.0 FL    MCH 29.6 24.0 - 34.0 PG    MCHC 35.0 31.0 - 37.0 g/dL    RDW 15.2 (H) 11.6 - 14.5 %    PLATELET 782 (H) 700 - 420 K/uL    MPV 11.4 9.2 - 11.8 FL    NRBC 0.0 0.0  WBC    ABSOLUTE NRBC 0.00 0.00 - 0.01 K/uL    NEUTROPHILS 81 (H) 40 - 73 %    LYMPHOCYTES 9 (L) 21 - 52 %    MONOCYTES 7 3 - 10 %    EOSINOPHILS 1 0 - 5 %    BASOPHILS 0 0 - 2 %    METAMYELOCYTES 1 (H) 0 %    MYELOCYTES 1 (H) 0 %    IMMATURE GRANULOCYTES 0 %    ABS. NEUTROPHILS 28.4 (H) 1.8 - 8.0 K/UL    ABS. LYMPHOCYTES 3.2 0.9 - 3.6 K/UL    ABS. MONOCYTES 2.5 (H) 0.05 - 1.2 K/UL    ABS. EOSINOPHILS 0.4 0.0 - 0.4 K/UL    ABS. BASOPHILS 0.0 0.0 - 0.1 K/UL    ABS. IMM.  GRANS. 0.0 K/UL    DF Manual      PLATELET COMMENTS PLATELETS APPEAR INCREASED/ LARGE PLATELETS NOTED     RBC COMMENTS Poikilocytosis  1+        RBC COMMENTS OVALOCYTES, KRISTINA CELLS, SCHISTOCYTES, SPHEROCYTES     WBC COMMENTS Dohle bodies     MAGNESIUM    Collection Time: 10/03/21  3:00 AM   Result Value Ref Range    Magnesium 1.9 1.7 - 2.8 mg/dL   METABOLIC PANEL, BASIC    Collection Time: 10/03/21  3:00 AM   Result Value Ref Range    Sodium 146 (H) 135 - 145 mmol/L    Potassium 4.4 3.2 - 5.1 mmol/L    Chloride 120 (H) 94 - 111 mmol/L    CO2 18 (L) 21 - 33 mmol/L    Anion gap 8 mmol/L    Glucose 141 (H) 70 - 110 mg/dL    BUN 40 (H) 9 - 21 mg/dL    Creatinine 0.90 0.8 - 1.50 mg/dL    BUN/Creatinine ratio 44      GFR est AA >60 ml/min/1.73m2    GFR est non-AA >60 ml/min/1.73m2    Calcium 7.8 (L) 8.5 - 10.5 mg/dL   PHOSPHORUS    Collection Time: 10/03/21  3:00 AM   Result Value Ref Range    Phosphorus 1.9 (L) 2.5 - 4.5 mg/dL       Malnutrition Assessment:  Malnutrition Status:  Severe malnutrition    Context:  Acute illness     Findings of the 6 clinical characteristics of malnutrition:   Energy Intake:  7 - 50% or less of est energy requirements for 5 or more days (currently with C-diff and hx of dementia failure to thrive)  Weight Loss:  Unable to assess     Body Fat Loss:  7 - Moderate body fat loss, Triceps, Orbital, Fat overlying ribs, Buccal region   Muscle Mass Loss:  7 - Moderate muscle mass loss, Temples (temporalis), Thigh (quadriceps), Scapula (trapezius), Hand (interosseous), Clavicles (pectoralis & deltoids), Calf  Fluid Accumulation:  No significant fluid accumulation,     Strength:  Not performed         Estimated Daily Nutrient Needs:  Energy (kcal): 5361-2895 kcal/day; Weight Used for Energy Requirements: Admission (60 kg)  Protein (g): 60-72 g/day; Weight Used for Protein Requirements: Admission (1-1.2 g/kg)  Fluid (ml/day): 8343-5543 mL/day; Method Used for Fluid Requirements: 1 ml/kcal      Nutrition Related Findings:  underweight BMI 20.9 for age of 74 yo or >. admitted due to sepsis 2/2 UTI and + C-diff. Consulted for failure to thrive pt is thin and bedbound. pressure injury to right heel. Wounds:    Pressure injury (righ heel)       Current Nutrition Therapies:  ADULT DIET Regular  ADULT ORAL NUTRITION SUPPLEMENT Lunch;  Other Supplement; gelatein 20  ADULT ORAL NUTRITION SUPPLEMENT Breakfast, Dinner; Diabetic Supplement    Anthropometric Measures:  · Height:  5' 6\" (167.6 cm)  · Current Body Wt:  59 kg (130 lb)   · Admission Body Wt:  130 lb    · Usual Body Wt:        · Ideal Body Wt:  142 lbs:  91.5 %   · Adjusted Body Weight:   ; Weight Adjustment for: No adjustment   · Adjusted BMI:       · BMI Category:  Underweight (BMI less than 22) age over 72       Nutrition Diagnosis:   · Inadequate protein-energy intake, Altered GI function, Increased nutrient needs related to altered GI function, cognitive or neurological impairment, impaired nutrient utilization, inadequate protein-energy intake, increased demand for energy/nutrients as evidenced by diarrhea, GI abnormality, lab values, poor intake prior to admission      Nutrition Interventions:   Food and/or Nutrient Delivery: Continue current diet, Start oral nutrition supplement  Nutrition Education and Counseling: Education not appropriate  Coordination of Nutrition Care: Continue to monitor while inpatient    Goals:  albumin > 3.5, Pt to eat > 75% of meals and supplements, BM q 1-3 days, BMI 22-25 for adults > 73 yo, prevent skin breakdown       Nutrition Monitoring and Evaluation:   Behavioral-Environmental Outcomes:    Food/Nutrient Intake Outcomes: Food and nutrient intake, Supplement intake, Diet advancement/tolerance  Physical Signs/Symptoms Outcomes: Diarrhea, Weight, Skin, Meal time behavior, Biochemical data     F/U 10/6/2021    Discharge Planning:     Too soon to determine     Electronically signed by Hardik Langston on 10/3/2021 at 4:26 PM    Contact: [facility] [phone]

## 2021-10-04 LAB
ANION GAP SERPL CALC-SCNC: 6 MMOL/L
BACTERIA SPEC CULT: ABNORMAL
BASOPHILS # BLD: 0 K/UL (ref 0–0.1)
BASOPHILS NFR BLD: 0 % (ref 0–2)
BUN SERPL-MCNC: 38 MG/DL (ref 9–21)
BUN/CREAT SERPL: 38
CA-I BLD-MCNC: 7.8 MG/DL (ref 8.5–10.5)
CHLORIDE SERPL-SCNC: 116 MMOL/L (ref 94–111)
CO2 SERPL-SCNC: 21 MMOL/L (ref 21–33)
COLONY COUNT,CNT: ABNORMAL
COLONY COUNT,CNT: ABNORMAL
CREAT SERPL-MCNC: 1 MG/DL (ref 0.8–1.5)
DIFFERENTIAL METHOD BLD: ABNORMAL
EOSINOPHIL # BLD: 0 K/UL (ref 0–0.4)
EOSINOPHIL NFR BLD: 0 % (ref 0–5)
ERYTHROCYTE [DISTWIDTH] IN BLOOD BY AUTOMATED COUNT: 15.4 % (ref 11.6–14.5)
GLUCOSE SERPL-MCNC: 105 MG/DL (ref 70–110)
HCT VFR BLD AUTO: 30.6 % (ref 36–48)
HGB BLD-MCNC: 10.7 G/DL (ref 13–16)
IMM GRANULOCYTES # BLD AUTO: 0 K/UL
IMM GRANULOCYTES NFR BLD AUTO: 0 %
LYMPHOCYTES # BLD: 3.5 K/UL (ref 0.9–3.6)
LYMPHOCYTES NFR BLD: 11 % (ref 21–52)
MAGNESIUM SERPL-MCNC: 1.9 MG/DL (ref 1.7–2.8)
MCH RBC QN AUTO: 29.3 PG (ref 24–34)
MCHC RBC AUTO-ENTMCNC: 35 G/DL (ref 31–37)
MCV RBC AUTO: 83.8 FL (ref 78–100)
MONOCYTES # BLD: 1.9 K/UL (ref 0.05–1.2)
MONOCYTES NFR BLD: 6 % (ref 3–10)
NEUTS SEG # BLD: 26.3 K/UL (ref 1.8–8)
NEUTS SEG NFR BLD: 83 % (ref 40–73)
NRBC # BLD: 0 K/UL (ref 0–0.01)
NRBC BLD-RTO: 0 PER 100 WBC
PHOSPHATE SERPL-MCNC: 1.7 MG/DL (ref 2.5–4.5)
PLATELET # BLD AUTO: 554 K/UL (ref 135–420)
PMV BLD AUTO: 11.5 FL (ref 9.2–11.8)
POTASSIUM SERPL-SCNC: 4.5 MMOL/L (ref 3.2–5.1)
RBC # BLD AUTO: 3.65 M/UL (ref 4.35–5.65)
RBC MORPH BLD: ABNORMAL
SODIUM SERPL-SCNC: 143 MMOL/L (ref 135–145)
SPECIAL REQUESTS,SREQ: ABNORMAL
WBC # BLD AUTO: 31.7 K/UL (ref 4.6–13.2)

## 2021-10-04 PROCEDURE — 84100 ASSAY OF PHOSPHORUS: CPT

## 2021-10-04 PROCEDURE — 85025 COMPLETE CBC W/AUTO DIFF WBC: CPT

## 2021-10-04 PROCEDURE — 74011250637 HC RX REV CODE- 250/637: Performed by: NURSE PRACTITIONER

## 2021-10-04 PROCEDURE — 97530 THERAPEUTIC ACTIVITIES: CPT

## 2021-10-04 PROCEDURE — 74011000258 HC RX REV CODE- 258: Performed by: NURSE PRACTITIONER

## 2021-10-04 PROCEDURE — 74011250636 HC RX REV CODE- 250/636: Performed by: INTERNAL MEDICINE

## 2021-10-04 PROCEDURE — 83735 ASSAY OF MAGNESIUM: CPT

## 2021-10-04 PROCEDURE — 74011250636 HC RX REV CODE- 250/636: Performed by: NURSE PRACTITIONER

## 2021-10-04 PROCEDURE — 80048 BASIC METABOLIC PNL TOTAL CA: CPT

## 2021-10-04 PROCEDURE — 65270000029 HC RM PRIVATE

## 2021-10-04 PROCEDURE — 36415 COLL VENOUS BLD VENIPUNCTURE: CPT

## 2021-10-04 PROCEDURE — 74011250637 HC RX REV CODE- 250/637: Performed by: INTERNAL MEDICINE

## 2021-10-04 RX ADMIN — AMPICILLIN SODIUM 2 G: 2 INJECTION, POWDER, FOR SOLUTION INTRAMUSCULAR; INTRAVENOUS at 13:52

## 2021-10-04 RX ADMIN — ATORVASTATIN CALCIUM 10 MG: 10 TABLET, FILM COATED ORAL at 22:39

## 2021-10-04 RX ADMIN — Medication 10 ML: at 22:39

## 2021-10-04 RX ADMIN — VANCOMYCIN HYDROCHLORIDE 250 MG: KIT at 06:19

## 2021-10-04 RX ADMIN — VANCOMYCIN HYDROCHLORIDE 250 MG: KIT at 19:58

## 2021-10-04 RX ADMIN — ASPIRIN 81 MG: 81 TABLET, COATED ORAL at 09:32

## 2021-10-04 RX ADMIN — POTASSIUM CHLORIDE, DEXTROSE MONOHYDRATE AND SODIUM CHLORIDE 50 ML/HR: 150; 5; 450 INJECTION, SOLUTION INTRAVENOUS at 04:02

## 2021-10-04 RX ADMIN — VANCOMYCIN HYDROCHLORIDE 250 MG: KIT at 13:53

## 2021-10-04 RX ADMIN — LOPERAMIDE HYDROCHLORIDE 2 MG: 2 CAPSULE ORAL at 09:32

## 2021-10-04 RX ADMIN — CYANOCOBALAMIN TAB 500 MCG 250 MCG: 500 TAB at 09:32

## 2021-10-04 RX ADMIN — AMPICILLIN SODIUM 2 G: 2 INJECTION, POWDER, FOR SOLUTION INTRAMUSCULAR; INTRAVENOUS at 19:58

## 2021-10-04 RX ADMIN — Medication 10 ML: at 05:51

## 2021-10-04 RX ADMIN — VANCOMYCIN HYDROCHLORIDE 1000 MG: 1 INJECTION, POWDER, LYOPHILIZED, FOR SOLUTION INTRAVENOUS at 09:32

## 2021-10-04 RX ADMIN — Medication 1000 UNITS: at 09:32

## 2021-10-04 NOTE — PROGRESS NOTES
8499- bedside shift report completed and care of the patient assumed    0740- breakfast offered, refused, brief changed of fecal incontinence     0938- AM medications administered at this time    1130- lunch served and patient education leaflet on c diff provided to patient.     1400- brief changed due to fecal incontinence    1720- brief checked, dry and clean

## 2021-10-04 NOTE — PROGRESS NOTES
HOSPITALIST PROGRESS NOTE  Trudi Carrel MD, [address]         Daily Progress Note: 10/4/2021  C. difficile contact precautions noted. Appropriate PPE donned and doffed. Subjective:     Patient is alert and oriented x2 only with wife at bedside. Continues to have significant abdominal pain along with diarrhea as well as nausea and vomiting. Not really able to tolerate any p.o. diet currently. No overnight fever/chills, chest pain, shortness of breath noted.       Medications reviewed  Current Facility-Administered Medications   Medication Dose Route Frequency    ampicillin (OMNIPEN) 2 g in 0.9% sodium chloride (MBP/ADV) 100 mL MBP  2 g IntraVENous Q6H    dextrose 5% - 0.45% NaCl with KCl 20 mEq/L infusion  50 mL/hr IntraVENous CONTINUOUS    loperamide (IMODIUM) capsule 2 mg  2 mg Oral Q4H PRN    vancomycin (FIRVANQ) 50 mg/mL oral solution 250 mg  250 mg Oral Q6H    aspirin delayed-release tablet 81 mg  81 mg Oral DAILY    atorvastatin (LIPITOR) tablet 10 mg  10 mg Oral QHS    cyanocobalamin (VITAMIN B12) tablet 250 mcg  250 mcg Oral DAILY    cholecalciferol (VITAMIN D3) (1000 Units /25 mcg) tablet 1,000 Units  1,000 Units Oral DAILY    sodium chloride (NS) flush 5-10 mL  5-10 mL IntraVENous PRN    sodium chloride (NS) flush 5-40 mL  5-40 mL IntraVENous Q8H    sodium chloride (NS) flush 5-40 mL  5-40 mL IntraVENous PRN    acetaminophen (TYLENOL) tablet 650 mg  650 mg Oral Q6H PRN    Or    acetaminophen (TYLENOL) suppository 650 mg  650 mg Rectal Q6H PRN    polyethylene glycol (MIRALAX) packet 17 g  17 g Oral DAILY PRN    ondansetron (ZOFRAN ODT) tablet 4 mg  4 mg Oral Q8H PRN    Or    ondansetron (ZOFRAN) injection 4 mg  4 mg IntraVENous Q6H PRN    [Held by provider] losartan (COZAAR) tablet 100 mg  100 mg Oral DAILY    LORazepam (ATIVAN) injection 1 mg  1 mg IntraVENous Q6H PRN       Review of Systems:   A comprehensive review of systems was negative except for that written in the HPI. Objective:   Physical Exam:     Visit Vitals  /69   Pulse 61   Temp 97.7 °F (36.5 °C)   Resp 14   Ht 5' 6\" (1.676 m)   Wt 54.3 kg (119 lb 9.6 oz)   SpO2 98%   BMI 19.30 kg/m²      O2 Device: None (Room air)  No data found. Temp (24hrs), Av.5 °F (36.4 °C), Min:97 °F (36.1 °C), Max:98 °F (36.7 °C)    No intake/output data recorded. 10/02 1901 - 10/04 0700  In: 2734.2 [P.O.:960; I.V.:1774.2]  Out: 1600 [Urine:1600]    General:  Alert, cooperative, no distress, appears stated age. Lungs:   Clear to auscultation bilaterally. Chest wall:  No tenderness or deformity. Heart:  Regular rate and rhythm, S1, S2 normal, no murmur, click, rub or gallop. Abdomen:   Soft, mildly distended with tenderness to palpation diffusely. Bowel sounds diminished. No masses,  No organomegaly. Extremities: Extremities normal, atraumatic, no cyanosis or edema. Pulses: 2+ and symmetric all extremities. Skin: Skin color, texture, turgor normal. No rashes or lesions   Neurologic: No gross sensory or motor deficits     Data Review:       Recent Days:  Recent Labs     10/04/21  0340 10/03/21  0300 10/02/21  0410   WBC 31.7* 35.0* 31.6*   HGB 10.7* 10.3* 10.4*   HCT 30.6* 29.4* 30.0*   * 551* 561*     Recent Labs     10/04/21  0340 10/03/21  0300 10/02/21  0410    146* 143   K 4.5 4.4 4.0   * 120* 118*   CO2 21 18* 18*    141* 132*   BUN 38* 40* 50*   CREA 1.00 0.90 1.20   CA 7.8* 7.8* 7.7*   MG 1.9 1.9 2.0   PHOS 1.7* 1.9* 2.3*     No results for input(s): PH, PCO2, PO2, HCO3, FIO2 in the last 72 hours.     24 Hour Results:  Recent Results (from the past 24 hour(s))   CBC WITH AUTOMATED DIFF    Collection Time: 10/04/21  3:40 AM   Result Value Ref Range    WBC 31.7 (H) 4.6 - 13.2 K/uL    RBC 3.65 (L) 4.35 - 5.65 M/uL    HGB 10.7 (L) 13.0 - 16.0 g/dL    HCT 30.6 (L) 36.0 - 48.0 %    MCV 83.8 78.0 - 100.0 FL    MCH 29.3 24.0 - 34.0 PG    MCHC 35.0 31.0 - 37.0 g/dL    RDW 15.4 (H) 11.6 - 14.5 %    PLATELET 245 (H) 484 - 420 K/uL    MPV 11.5 9.2 - 11.8 FL    NRBC 0.0 0.0  WBC    ABSOLUTE NRBC 0.00 0.00 - 0.01 K/uL    NEUTROPHILS 83 (H) 40 - 73 %    LYMPHOCYTES 11 (L) 21 - 52 %    MONOCYTES 6 3 - 10 %    EOSINOPHILS 0 0 - 5 %    BASOPHILS 0 0 - 2 %    IMMATURE GRANULOCYTES 0 %    ABS. NEUTROPHILS 26.3 (H) 1.8 - 8.0 K/UL    ABS. LYMPHOCYTES 3.5 0.9 - 3.6 K/UL    ABS. MONOCYTES 1.9 (H) 0.05 - 1.2 K/UL    ABS. EOSINOPHILS 0.0 0.0 - 0.4 K/UL    ABS. BASOPHILS 0.0 0.0 - 0.1 K/UL    ABS. IMM. GRANS. 0.0 K/UL    DF AUTOMATED      RBC COMMENTS Normocytic, Normochromic     MAGNESIUM    Collection Time: 10/04/21  3:40 AM   Result Value Ref Range    Magnesium 1.9 1.7 - 2.8 mg/dL   METABOLIC PANEL, BASIC    Collection Time: 10/04/21  3:40 AM   Result Value Ref Range    Sodium 143 135 - 145 mmol/L    Potassium 4.5 3.2 - 5.1 mmol/L    Chloride 116 (H) 94 - 111 mmol/L    CO2 21 21 - 33 mmol/L    Anion gap 6 mmol/L    Glucose 105 70 - 110 mg/dL    BUN 38 (H) 9 - 21 mg/dL    Creatinine 1.00 0.8 - 1.50 mg/dL    BUN/Creatinine ratio 38      GFR est AA >60 ml/min/1.73m2    GFR est non-AA >60 ml/min/1.73m2    Calcium 7.8 (L) 8.5 - 10.5 mg/dL   PHOSPHORUS    Collection Time: 10/04/21  3:40 AM   Result Value Ref Range    Phosphorus 1.7 (L) 2.5 - 4.5 mg/dL           Assessment/Plan:     Sepsis due to cdif colitis  Dc Levaquin and cefepime   Cont ivf  Cont po vanco  Continues have diarrhea. Continues with significant leukocytosis.     Enterococcus UTI  Had his james changed out on admission  Change IV vancomycin as patient currently already on p.o. vancomycin.   Discontinue IV vancomycin and change to IV ampicillin for more narrow antibiotic therapy.     Acute kidney injury ([address]  Acute kidney injury related to sepsis dehydration  Significant improved with improved creatinine noted.     Elevated troponin  Troponin elevated suspected from VLADIMIR  No chest pain was reported  ACS or NSTEMI not suspected at this time     Urethral bleeding  Miles changed out, holding heparin     Failure to thrive in adult  Patient made DNR status after conversation with wife  Antibiotics and fluid will be continued           Care Plan discussed with: Patient/Family    Total time spent with patient: 35 minutes. With greater than 50% spent in coordination of care and counseling.     Brenda Rivas MD

## 2021-10-04 NOTE — PROGRESS NOTES
Problem: Mobility Impaired (Adult and Pediatric)  Goal: *Acute Goals and Plan of Care (Insert Text)  Description: Physical Therapy Goals  Initiated 9/30/2021 and to be accomplished within 7 day(s)  1. Patient will move from supine to sit and sit to supine , scoot up and down, and roll side to side in bed with minimal assistance/contact guard assist.    2.  Patient will transfer from bed to chair and chair to bed with moderate assistance  using the least restrictive device. 3.  Patient will perform sit to stand with moderate assistance . 4. Patient will ambulate with moderate assistance  for 15 feet with the least restrictive device. PLOF: Pt has does not get up much anymore, he was receiving hospice at home and is reported to be bedbound. Wife not present to provide recent functional levels. Outcome: Not Progressing Towards Goal   PHYSICAL THERAPY TREATMENT     Patient: Dianna Graves (24 y.o. male)  Date: 10/4/2021  Primary Diagnosis: Septic shock ([address] [A41.9, R65.21]  Acute kidney injury ([address] [N17.9]  Sepsis due to urinary tract infection ([address] [A41.9, N39.0]  VLADIMIR (acute kidney injury) ([address] [N17.9]  Elevated troponin [R77.8]        Precautions:   Fall, Skin    ASSESSMENT :  Pt is poorly motivated. He is strongly encouraged by Wife to participate with PT. Pt aggress to work on sitting EOB. Pt worked on Sup <> sit however is limited due to dizziness and reported fatigue. See below for treatment details. Progression toward goals: slow progress towards mobility goals. Poorly motivated. []      Improving appropriately and progressing toward goals  [x]      Improving slowly and progressing toward goals  []      Not making progress toward goals and plan of care will be adjusted       PLAN :  Patient continues to benefit from skilled intervention to address the above impairments. Continue treatment per established plan of care.       Frequency/Duration: Patient will be followed by physical therapy 1-2 times per day/4-7 days per week to address goals. Discharge Recommendations: Hospice  Further Equipment Recommendations for Discharge: N/A     SUBJECTIVE:   Patient stated \"I'm dizzy I need to lay down\"    OBJECTIVE DATA SUMMARY:       Functional Mobility Training:  Bed Mobility:  Rolling: Minimum assistance  Supine to Sit: Moderate assistance  Sit to Supine: Moderate assistance;Maximum assistance  Scooting: Maximum assistance  Cued to look and reach for railing, and to bend knees to roll over. Pt rolled side to side 3x. Pt then educated on scooting up in bed. Cued Pt to push with LE and pull self with UE using railings. Pt only able to attempt and reported he was to tired to do anymore. Pain:  Pain level pre-treatment: 0/10   Pain level post-treatment: 0/10   Pain Location: N/A  Activity Tolerance:   Poor  Please refer to the flowsheet for vital signs taken during this treatment. After treatment:   []         Patient left in no apparent distress sitting up in chair  [x]         Patient left in no apparent distress in bed  [x]         Call bell left within reach  []         Nursing notified  [x]         Caregiver present  [x]         Bed alarm activated  []         SCDs applied    COMMUNICATION/EDUCATION:   [x]         Role of Physical Therapy in the acute care setting. []         Fall prevention education was provided and the patient/caregiver indicated understanding. []         Patient/family have participated as able in goal setting and plan of care. []         Patient/family agree to work toward stated goals and plan of care. [x]         Patient understands intent and goals of therapy, but is neutral about his/her participation. []         Patient is unable to participate in goal setting/plan of care: ongoing with therapy staff.  []         Other:     Thank you for this referral.  Lucero Bro, PTA   Time Calculation: 19 mins

## 2021-10-04 NOTE — PROGRESS NOTES
Problem: Urinary Tract Infection - Adult  Goal: *Absence of infection signs and symptoms  Outcome: Progressing Towards Goal     Problem: Patient Education: Go to Patient Education Activity  Goal: Patient/Family Education  Outcome: Progressing Towards Goal     Problem: Pressure Injury - Risk of  Goal: *Prevention of pressure injury  Description: Document Malcom Scale and appropriate interventions in the flowsheet. Outcome: Progressing Towards Goal  Note: Pressure Injury Interventions:  Sensory Interventions: Float heels, Keep linens dry and wrinkle-free, Minimize linen layers    Moisture Interventions: Absorbent underpads, Apply protective barrier, creams and emollients    Activity Interventions: Assess need for specialty bed    Mobility Interventions: Assess need for specialty bed, Turn and reposition approx. every two hours(pillow and wedges)    Nutrition Interventions: Document food/fluid/supplement intake    Friction and Shear Interventions: Minimize layers                Problem: Patient Education: Go to Patient Education Activity  Goal: Patient/Family Education  Outcome: Progressing Towards Goal     Problem: Risk for Spread of Infection  Goal: Prevent transmission of infectious organism to others  Description: Prevent the transmission of infectious organisms to other patients, staff members, and visitors. Outcome: Progressing Towards Goal     Problem: Patient Education:  Go to Education Activity  Goal: Patient/Family Education  Outcome: Progressing Towards Goal     Problem: Patient Education: Go to Patient Education Activity  Goal: Patient/Family Education  Outcome: Progressing Towards Goal     Problem: Nutrition Deficit  Goal: *Optimize nutritional status  Outcome: Progressing Towards Goal     Problem: Falls - Risk of  Goal: *Absence of Falls  Description: Document Clydene Bone Fall Risk and appropriate interventions in the flowsheet.   Outcome: Progressing Towards Goal  Note: Fall Risk Interventions:  Mobility Interventions: Bed/chair exit alarm    Mentation Interventions: Adequate sleep, hydration, pain control    Medication Interventions: Bed/chair exit alarm    Elimination Interventions: Bed/chair exit alarm, Call light in reach              Problem: Patient Education: Go to Patient Education Activity  Goal: Patient/Family Education  Outcome: Progressing Towards Goal

## 2021-10-04 NOTE — PROGRESS NOTES
1900 - Assumed care of pt, shift report given    2143 - VSS. Assessment completed. HS medication given, pt tolerated well. Miles draining by gravity without issue. 0015 - VSS. Cleaned pt of large incontinent episode of loose stool, barrier cream applied    0300- Pt resting in bed, appears to be asleep    0636 - Cleaned pt of incontinent episode of stool. Miles emptied. Barrier cream applied. Scheduled medication given.

## 2021-10-04 NOTE — PROGRESS NOTES
Chart reviewed; no acute cardiac events over the weekend. Patient is pending discharge back home with hospice or to LTC. Will sign off at this time. If any acute cardiac needs arise, please feel free to reconsult. Thank you for allowing [facility] to participate in the care of this patient.

## 2021-10-05 LAB
BACTERIA SPEC CULT: NORMAL
BACTERIA SPEC CULT: NORMAL
MAGNESIUM SERPL-MCNC: 1.9 MG/DL (ref 1.7–2.8)
O+P SPEC MICRO: NORMAL
O+P STL CONC: NORMAL
SPECIAL REQUESTS,SREQ: NORMAL
SPECIAL REQUESTS,SREQ: NORMAL
SPECIMEN SOURCE: NORMAL

## 2021-10-05 PROCEDURE — 74011250636 HC RX REV CODE- 250/636: Performed by: INTERNAL MEDICINE

## 2021-10-05 PROCEDURE — 74011250636 HC RX REV CODE- 250/636: Performed by: NURSE PRACTITIONER

## 2021-10-05 PROCEDURE — 36415 COLL VENOUS BLD VENIPUNCTURE: CPT

## 2021-10-05 PROCEDURE — 83735 ASSAY OF MAGNESIUM: CPT

## 2021-10-05 PROCEDURE — 65270000029 HC RM PRIVATE

## 2021-10-05 PROCEDURE — 74011000258 HC RX REV CODE- 258: Performed by: NURSE PRACTITIONER

## 2021-10-05 PROCEDURE — 74011250637 HC RX REV CODE- 250/637: Performed by: NURSE PRACTITIONER

## 2021-10-05 PROCEDURE — 74011250637 HC RX REV CODE- 250/637: Performed by: INTERNAL MEDICINE

## 2021-10-05 RX ADMIN — Medication 10 ML: at 05:39

## 2021-10-05 RX ADMIN — VANCOMYCIN HYDROCHLORIDE 250 MG: KIT at 03:35

## 2021-10-05 RX ADMIN — AMPICILLIN SODIUM 2 G: 2 INJECTION, POWDER, FOR SOLUTION INTRAMUSCULAR; INTRAVENOUS at 15:11

## 2021-10-05 RX ADMIN — AMPICILLIN SODIUM 2 G: 2 INJECTION, POWDER, FOR SOLUTION INTRAMUSCULAR; INTRAVENOUS at 03:35

## 2021-10-05 RX ADMIN — Medication 10 ML: at 15:11

## 2021-10-05 RX ADMIN — VANCOMYCIN HYDROCHLORIDE 250 MG: KIT at 21:25

## 2021-10-05 RX ADMIN — ATORVASTATIN CALCIUM 10 MG: 10 TABLET, FILM COATED ORAL at 21:25

## 2021-10-05 RX ADMIN — AMPICILLIN SODIUM 2 G: 2 INJECTION, POWDER, FOR SOLUTION INTRAMUSCULAR; INTRAVENOUS at 21:24

## 2021-10-05 RX ADMIN — AMPICILLIN SODIUM 2 G: 2 INJECTION, POWDER, FOR SOLUTION INTRAMUSCULAR; INTRAVENOUS at 09:20

## 2021-10-05 RX ADMIN — VANCOMYCIN HYDROCHLORIDE 250 MG: KIT at 15:11

## 2021-10-05 RX ADMIN — VANCOMYCIN HYDROCHLORIDE 250 MG: KIT at 09:20

## 2021-10-05 RX ADMIN — CYANOCOBALAMIN TAB 500 MCG 250 MCG: 500 TAB at 09:17

## 2021-10-05 RX ADMIN — Medication 1000 UNITS: at 09:17

## 2021-10-05 RX ADMIN — ASPIRIN 81 MG: 81 TABLET, COATED ORAL at 09:17

## 2021-10-05 RX ADMIN — POTASSIUM CHLORIDE, DEXTROSE MONOHYDRATE AND SODIUM CHLORIDE 50 ML/HR: 150; 5; 450 INJECTION, SOLUTION INTRAVENOUS at 03:44

## 2021-10-05 NOTE — PROGRESS NOTES
Pt unable to participate/declined PT due to:  []  Nausea/vomiting  []  Eating  []  Pain  [x]  Pt lethargic  [x]  Not feeling well  Educated Pt on the importance of Skilled PT however patient continued to decline  Will f/u later as schedule allows. Thank you.   Tory Fish, PTA

## 2021-10-05 NOTE — PROGRESS NOTES
Hospitalist Progress Note     INTERNAL MEDICINE PROGRESS NOTE  Patient: Kelly North   YOB: 1940   MRN: [id number]      Hospital course / Assessment    Principle Problems:  Sepsis due to cdif colitis with leucocytosis and elevated BUN   Stopped Levaquin and cefepime   Continue with PO Vancomycin course, Continue with IVF support  Continue monitor lytes and replace K   WBC still elevated 31K  Enterococcus UTI  Had his james changed out on admission  Discontinue IV vancomycin and change to IV ampicillin for more narrow antibiotic therapy. Acute kidney injury ([facility] [address] [facility]  Acute kidney injury related to sepsis dehydration  Treated with IVF support, hold ARB, d/c IV Vanc   Significant improved with improved creatinine noted.   Elevated troponin  Troponin elevated suspected from VLADIMIR  No chest pain was reported  ACS or NSTEMI not suspected at this time  Continue with ASA/ HI Statin   Urethral bleeding  James changed out, holding heparin may resume when urine cleared   Failure to thrive in adult, severe malnutrition   Patient made DNR status after conversation with wife  Nutritionist consulted        Code Status: DNR  DVT prophylaxis: pharmacologic and mechanical  Today Recommendation and Plan:   Continue with antibiotic course   Continue monitor renal function   PT evaluation   Resume sq heparin prophylactic dose when urine clear     Disposition    Disposition: TBD     Subjective / ROS:   Patient states still having abd pain, Nausea and diarrhea, no fever or chills ,       Medical Decision Making   Chart, Images and Lab data reviewed, necessary medical Orders placed   Discussed with nursing staff     Vitals:    10/04/21 0805 10/04/21 2231 10/05/21 0102 10/05/21 1015   BP: 125/69 123/64 139/68 121/64   Pulse: 61 (!) 56 60 60   Resp: 14 15 15 16   Temp: 97.7 °F (36.5 °C) 97.2 °F (36.2 °C) 97.5 °F (36.4 °C) 97.5 °F (36.4 °C)   SpO2: 98% 90% 98%    Weight:       Height:         Temp (24hrs), Av.4 °F (36.3 °C), Min:97.2 °F (36.2 °C), Max:97.5 °F (36.4 °C)      Intake/Output Summary (Last 24 hours) at 10/5/2021 1151  Last data filed at 10/5/2021 1553  Gross per 24 hour   Intake --   Output 600 ml   Net -600 ml       Physical Exam:   General Appearance:   Appears in no acute distress.,  HEENT:   Moist oral mucous membranes, conjunctiva clear,   Neck:   Supple  Lungs: No wheezes. , No rales. , Normal respiratory effort,   Heart:   Regular rate and rhythm  Abdomen:   Soft , Non-distended and mild-tender,   Extremities:   - edema of legs  Neuro:   alert, oriented, moves all extremities well    Current medications:     Current Facility-Administered Medications   Medication Dose Route Frequency    ampicillin (OMNIPEN) 2 g in 0.9% sodium chloride (MBP/ADV) 100 mL MBP  2 g IntraVENous Q6H    dextrose 5% - 0.45% NaCl with KCl 20 mEq/L infusion  50 mL/hr IntraVENous CONTINUOUS    loperamide (IMODIUM) capsule 2 mg  2 mg Oral Q4H PRN    vancomycin (FIRVANQ) 50 mg/mL oral solution 250 mg  250 mg Oral Q6H    aspirin delayed-release tablet 81 mg  81 mg Oral DAILY    atorvastatin (LIPITOR) tablet 10 mg  10 mg Oral QHS    cyanocobalamin (VITAMIN B12) tablet 250 mcg  250 mcg Oral DAILY    cholecalciferol (VITAMIN D3) (1000 Units /25 mcg) tablet 1,000 Units  1,000 Units Oral DAILY    sodium chloride (NS) flush 5-10 mL  5-10 mL IntraVENous PRN    sodium chloride (NS) flush 5-40 mL  5-40 mL IntraVENous Q8H    sodium chloride (NS) flush 5-40 mL  5-40 mL IntraVENous PRN    acetaminophen (TYLENOL) tablet 650 mg  650 mg Oral Q6H PRN    Or    acetaminophen (TYLENOL) suppository 650 mg  650 mg Rectal Q6H PRN    polyethylene glycol (MIRALAX) packet 17 g  17 g Oral DAILY PRN    ondansetron (ZOFRAN ODT) tablet 4 mg  4 mg Oral Q8H PRN    Or    ondansetron (ZOFRAN) injection 4 mg  4 mg IntraVENous Q6H PRN    [Held by provider] losartan (COZAAR) tablet 100 mg  100 mg Oral DAILY    LORazepam (ATIVAN) injection 1 mg  1 mg IntraVENous Q6H PRN          Laboratory and Radiology Data :      No results found for: FERR  WBC   Date Value Ref Range Status   10/04/2021 31.7 (H) 4.6 - 13.2 K/uL Final     No results found for: MARI  No results found for: UEO    Microbiology    No results found for: GMS    Recent Results (from the past 24 hour(s))   MAGNESIUM    Collection Time: 10/05/21  5:09 AM   Result Value Ref Range    Magnesium 1.9 1.7 - 2.8 mg/dL       XR Results:  Results from Hospital Encounter encounter on 09/29/21    XR CHEST PORT    Narrative  EXAM: XR CHEST PORT    CLINICAL INDICATION/HISTORY: hematuria  -Additional: Chronic indwelling Miles catheter    COMPARISON: 8/27/2021    TECHNIQUE: Frontal view of the chest    _______________    FINDINGS:    HEART AND MEDIASTINUM: Stable appearing cardiac size and mediastinal contours. LUNGS AND PLEURAL SPACES: Lungs are mildly underexpanded, similar to prior. No  focal pneumonic opacity. No evidence of pneumothorax or pleural effusion. BONY THORAX AND SOFT TISSUES: No acute osseous abnormality    _______________    Impression  Mildly underexpanded lungs without superimposed acute radiographic abnormality. CT Results:  Results from Hospital Encounter encounter on 08/27/21    CT HEAD WO CONT    Narrative  EXAM: CT HEAD, WITHOUT IV CONTRAST    INDICATION: Fall with posttraumatic headache    COMPARISON: 8/10/2021    TECHNIQUE: Multiple axial CT images of the head were obtained extending from the  skull base through the vertex. Additional coronal and sagittal reformations were  also performed. One or more dose reduction techniques were used on this CT:  automated exposure control, adjustment of the mAs and/or kVp according to  patient size, and iterative reconstruction techniques. The specific techniques  used on this CT exam have been documented in the patient's electronic medical  record.   Digital Imaging and Communications in Medicine (DICOM) format image  data are available to nonaffiliated external healthcare facilities or entities  on a secure, media free, reciprocally searchable basis with patient  authorization for at least a 12-month period after this study. _______________    FINDINGS:    BRAIN AND POSTERIOR FOSSA: There is generalized prominence of the ventricular  system, associated with proportional widening of the cortical cerebral sulci,  compatible with generalized volume loss. Hazy hypoattenuation identified along  the periventricular white matter, a nonspecific finding which is most commonly  encountered in the setting of chronic microvascular disease. There is no  intracranial hemorrhage, mass effect, or midline shift. There are no  significant additional areas of abnormal parenchymal attenuation. EXTRA-AXIAL SPACES AND MENINGES: There are no abnormal extra-axial fluid  collections. CALVARIUM: No acute osseous abnormality. OTHER: The visualized portions of the paranasal sinuses and mastoid air cells  are clear.    _______________    Impression  1. No CT evidence of an acute intracranial abnormality or other findings  related to recent trauma. 2.  Mild cerebral atrophy/volume loss and periventricular white matter changes,  most commonly seen with chronic microvascular disease. MRI Results:  No results found for this or any previous visit. Nuclear Medicine Results:  No results found for this or any previous visit. US Results:  No results found for this or any previous visit. IR Results:  No results found for this or any previous visit. VAS/US Results:  No results found for this or any previous visit. Chacha Ordoñez M.D.   Hospitalist

## 2021-10-05 NOTE — PROGRESS NOTES
Patient given complete bed bath and linen change patient turned and heels floated ivf infusing. Patient denies any needs at this time.  CB in reach

## 2021-10-05 NOTE — PROGRESS NOTES
0730- received care of patient resting in bed with eyes closed. Patient easily aroused. Vitals stable. CB in reach     0920 - AM medications tolerated with sips of water. CB in reach     1100 - Patient resting in bed watching tv.  Cb in reach

## 2021-10-05 NOTE — PROGRESS NOTES
Physician Progress Note      PATIENT:               Marylene Bue  CSN #:                  [id number]  :                       1940  ADMIT DATE:       2021 4:31 PM  [address] [place] DATE:  RESPONDING  PROVIDER #:        Sotero Quinonez MD          QUERY TEXT:    Dear Hospitalist,    Pt admitted with Sepsis. Noted documentation of Severe Malnutrition in RD Consultant note dated . If possible, please document in progress notes and discharge summary:    The medical record reflects the following:  Risk Factors: Adult failure to thrive, C. diff, Hospice    Clinical Indicators: Per RD Malnutrition Assessment dated   **Malnutrition Status:  Severe malnutrition  -Context:  Acute illness  -Findings of the 6 clinical characteristics of malnutrition:  *    Energy Intake:  7 - 50% or less of est energy requirements for 5 or more days (currently with C-diff and hx of dementia failure to thrive)  *    Body Fat Loss:  7 - Moderate body fat loss, Triceps, Orbital, Fat overlying ribs, Buccal region  *   Muscle Mass Loss:  7 - Moderate muscle mass loss, Temples (temporalis), Thigh (quadriceps), Scapula (trapezius), Hand (interosseous), Clavicles (pectoralis & deltoids), Calf    Treatment: Per RD Nutrition Recommendations/Plan:  *  regular diet to encourage po intake if BG is > 150 then recommend change to 4 CHO choice meals  *  Provide glucerna BID and gelatein 20 with lunch trays      Thank you,  Nafisa GARCIA, RN, CRCR  Please contact me for any questions or concerns regarding this query at [email or web address]  Options provided:  -- Severe malnutrtion confirmed present on admission  -- Severe malnutrition ruled out  -- Other - I will add my own diagnosis  -- Disagree - Not applicable / Not valid  -- Disagree - Clinically unable to determine / Unknown  -- Refer to Clinical Documentation Reviewer    PROVIDER RESPONSE TEXT:    The diagnosis of severe malnutrition is confirmed as present on admission.     Query created by: Parul Colon on 10/1/2021 10:38 AM      Electronically signed by:  José Miguel Gordon MD 10/5/2021 12:34 PM

## 2021-10-06 LAB
BASOPHILS # BLD: 0 K/UL (ref 0–0.1)
BASOPHILS NFR BLD: 0 % (ref 0–2)
DIFFERENTIAL METHOD BLD: ABNORMAL
EOSINOPHIL # BLD: 0.3 K/UL (ref 0–0.4)
EOSINOPHIL NFR BLD: 1 % (ref 0–5)
ERYTHROCYTE [DISTWIDTH] IN BLOOD BY AUTOMATED COUNT: 15.8 % (ref 11.6–14.5)
HCT VFR BLD AUTO: 30.9 % (ref 36–48)
HGB BLD-MCNC: 10.9 G/DL (ref 13–16)
IMM GRANULOCYTES # BLD AUTO: 0 K/UL
IMM GRANULOCYTES NFR BLD AUTO: 0 %
LYMPHOCYTES # BLD: 3 K/UL (ref 0.9–3.6)
LYMPHOCYTES NFR BLD: 10 % (ref 21–52)
MAGNESIUM SERPL-MCNC: 1.9 MG/DL (ref 1.7–2.8)
MCH RBC QN AUTO: 29.9 PG (ref 24–34)
MCHC RBC AUTO-ENTMCNC: 35.3 G/DL (ref 31–37)
MCV RBC AUTO: 84.7 FL (ref 78–100)
MONOCYTES # BLD: 0.3 K/UL (ref 0.05–1.2)
MONOCYTES NFR BLD: 1 % (ref 3–10)
MYELOCYTES NFR BLD MANUAL: 1 %
NEUTS BAND NFR BLD MANUAL: 5 % (ref 0–5)
NEUTS SEG # BLD: 25.8 K/UL (ref 1.8–8)
NEUTS SEG NFR BLD: 82 % (ref 40–73)
NRBC # BLD: 0 K/UL (ref 0–0.01)
NRBC BLD-RTO: 0 PER 100 WBC
PLATELET # BLD AUTO: 542 K/UL (ref 135–420)
PLATELET COMMENTS,PCOM: ABNORMAL
PMV BLD AUTO: 11.4 FL (ref 9.2–11.8)
RBC # BLD AUTO: 3.65 M/UL (ref 4.35–5.65)
RBC MORPH BLD: ABNORMAL
WBC # BLD AUTO: 29.7 K/UL (ref 4.6–13.2)

## 2021-10-06 PROCEDURE — 74011250637 HC RX REV CODE- 250/637: Performed by: NURSE PRACTITIONER

## 2021-10-06 PROCEDURE — 74011250637 HC RX REV CODE- 250/637: Performed by: INTERNAL MEDICINE

## 2021-10-06 PROCEDURE — 74011000258 HC RX REV CODE- 258: Performed by: NURSE PRACTITIONER

## 2021-10-06 PROCEDURE — 85025 COMPLETE CBC W/AUTO DIFF WBC: CPT

## 2021-10-06 PROCEDURE — 74011250636 HC RX REV CODE- 250/636: Performed by: NURSE PRACTITIONER

## 2021-10-06 PROCEDURE — 83735 ASSAY OF MAGNESIUM: CPT

## 2021-10-06 PROCEDURE — 65270000029 HC RM PRIVATE

## 2021-10-06 PROCEDURE — 74011250636 HC RX REV CODE- 250/636: Performed by: INTERNAL MEDICINE

## 2021-10-06 PROCEDURE — 36415 COLL VENOUS BLD VENIPUNCTURE: CPT

## 2021-10-06 RX ADMIN — POTASSIUM CHLORIDE, DEXTROSE MONOHYDRATE AND SODIUM CHLORIDE 50 ML/HR: 150; 5; 450 INJECTION, SOLUTION INTRAVENOUS at 00:14

## 2021-10-06 RX ADMIN — POTASSIUM CHLORIDE, DEXTROSE MONOHYDRATE AND SODIUM CHLORIDE 50 ML/HR: 150; 5; 450 INJECTION, SOLUTION INTRAVENOUS at 22:13

## 2021-10-06 RX ADMIN — VANCOMYCIN HYDROCHLORIDE 250 MG: KIT at 21:09

## 2021-10-06 RX ADMIN — AMPICILLIN SODIUM 2 G: 2 INJECTION, POWDER, FOR SOLUTION INTRAMUSCULAR; INTRAVENOUS at 21:09

## 2021-10-06 RX ADMIN — AMPICILLIN SODIUM 2 G: 2 INJECTION, POWDER, FOR SOLUTION INTRAMUSCULAR; INTRAVENOUS at 08:41

## 2021-10-06 RX ADMIN — ATORVASTATIN CALCIUM 10 MG: 10 TABLET, FILM COATED ORAL at 21:09

## 2021-10-06 RX ADMIN — AMPICILLIN SODIUM 2 G: 2 INJECTION, POWDER, FOR SOLUTION INTRAMUSCULAR; INTRAVENOUS at 02:11

## 2021-10-06 RX ADMIN — CYANOCOBALAMIN TAB 500 MCG 250 MCG: 500 TAB at 08:41

## 2021-10-06 RX ADMIN — Medication 1000 UNITS: at 08:41

## 2021-10-06 RX ADMIN — Medication 10 ML: at 13:41

## 2021-10-06 RX ADMIN — VANCOMYCIN HYDROCHLORIDE 250 MG: KIT at 13:48

## 2021-10-06 RX ADMIN — ASPIRIN 81 MG: 81 TABLET, COATED ORAL at 08:41

## 2021-10-06 RX ADMIN — AMPICILLIN SODIUM 2 G: 2 INJECTION, POWDER, FOR SOLUTION INTRAMUSCULAR; INTRAVENOUS at 13:48

## 2021-10-06 RX ADMIN — VANCOMYCIN HYDROCHLORIDE 250 MG: KIT at 02:12

## 2021-10-06 RX ADMIN — VANCOMYCIN HYDROCHLORIDE 250 MG: KIT at 08:41

## 2021-10-06 NOTE — PROGRESS NOTES
Patient's niece states Mrs. Yolande Mckenzie wants to discontinue services with [facility] and go with [facility]. [facility] notified. Hospital Bed ordered with [facility].

## 2021-10-06 NOTE — PROGRESS NOTES
Problem: Urinary Tract Infection - Adult  Goal: *Absence of infection signs and symptoms  Outcome: Progressing Towards Goal     Problem: Pressure Injury - Risk of  Goal: *Prevention of pressure injury  Description: Document Malcom Scale and appropriate interventions in the flowsheet.   Outcome: Progressing Towards Goal  Note: Pressure Injury Interventions:  Sensory Interventions: Assess changes in LOC    Moisture Interventions: Absorbent underpads    Activity Interventions: Assess need for specialty bed    Mobility Interventions: Assess need for specialty bed    Nutrition Interventions: Document food/fluid/supplement intake    Friction and Shear Interventions: Minimize layers

## 2021-10-06 NOTE — PROGRESS NOTES
0720- received care of patent resting in bed with eyes closed. Patient easily aroused ivf infusing and james draining. Patient denies any needs a this time. CB in reach     0841- administered AM medications with sips or water. Wife at bedside.

## 2021-10-06 NOTE — PROGRESS NOTES
Problem: Urinary Tract Infection - Adult  Goal: *Absence of infection signs and symptoms  Outcome: Progressing Towards Goal     Problem: Patient Education: Go to Patient Education Activity  Goal: Patient/Family Education  Outcome: Progressing Towards Goal     Problem: Pressure Injury - Risk of  Goal: *Prevention of pressure injury  Description: Document Malcom Scale and appropriate interventions in the flowsheet.   Outcome: Progressing Towards Goal  Note: Pressure Injury Interventions:  Sensory Interventions: Assess changes in LOC    Moisture Interventions: Absorbent underpads    Activity Interventions: Assess need for specialty bed    Mobility Interventions: Assess need for specialty bed    Nutrition Interventions: Document food/fluid/supplement intake    Friction and Shear Interventions: Minimize layers

## 2021-10-06 NOTE — PROGRESS NOTES
Patient brief cleaned. Dressings to heels changed. Patient positioned for comfort. New iv placed # 20 right upper arm.

## 2021-10-06 NOTE — PROGRESS NOTES
1930   Received care of pt lying in bed with eyes closed. Routine assessment and vs completed. Pt denies any pain or discomfort at this time. Call bell in reach and bed in lowest position. 2200   HS meds accepted without difficulty. 0030  Turned and repositioned for comfort. 0400  Pt cleansed of large loose mucousy stool. 0545   Pt has slept at long intervals with no complaints verbalized.

## 2021-10-06 NOTE — PROGRESS NOTES
Pt unable to participate/declined PT due to:  []  Nausea/vomiting  []  Eating  []  Pain  []  Pt lethargic  [x]  Not feeling well  Educated Pt on the importance of Skilled PT however patient continued to decline  Will f/u later as schedule allows. Thank you.   Joann Duke, PTA

## 2021-10-06 NOTE — PROGRESS NOTES
Progress Note  Date:10/6/2021       Room:[facility]  Patient Name:Lalo Spencer     YOB: 1940     Age:81 y.o. Subjective    Patient seen at bedside. Contact precautions with gown and gloves worn during exam.  Patient awake alert no distress patient has baseline dementia confusion he does answer questions. Patient denies any complaint of pain no chest pain no nausea and vomiting. Patient pleasant and cooperative. At this time patient is stable no events overnight continue treatment as planned    Review of Systems   Unable to perform ROS: Dementia (Patient has baseline dementia although able to answer questions)   Constitutional: Negative for fever. Respiratory: Negative for cough and shortness of breath. Cardiovascular: Negative for chest pain. Gastrointestinal: Negative for nausea and vomiting. Genitourinary: Negative for flank pain. Objective           Vitals Last 24 Hours:  Patient Vitals for the past 24 hrs:   Temp Pulse Resp BP SpO2   10/06/21 0100 98 °F (36.7 °C) 68 16 122/81 96 %   10/05/21 2040 98 °F (36.7 °C) 68 16 119/72 96 %   10/05/21 1524 97.1 °F (36.2 °C) 60 17 (!) 125/57 98 %   10/05/21 1015 97.5 °F (36.4 °C) 60 16 121/64 --        I/O (24Hr): Intake/Output Summary (Last 24 hours) at 10/6/2021 0153  Last data filed at 10/5/2021 1753  Gross per 24 hour   Intake 150 ml   Output 800 ml   Net -650 ml       Physical Exam  Vitals and nursing note reviewed. Constitutional:       General: He is not in acute distress. Appearance: He is well-developed. He is ill-appearing. He is not toxic-appearing or diaphoretic. HENT:      Head: Normocephalic and atraumatic. Mouth/Throat:      Mouth: Mucous membranes are moist.   Eyes:      Conjunctiva/sclera: Conjunctivae normal.      Pupils: Pupils are equal, round, and reactive to light. Cardiovascular:      Rate and Rhythm: Normal rate and regular rhythm.    Pulmonary:      Effort: Pulmonary effort is normal. No respiratory distress. Breath sounds: Normal breath sounds. No wheezing or rhonchi. Abdominal:      General: Bowel sounds are normal. There is no distension. Palpations: Abdomen is soft. Tenderness: There is no abdominal tenderness. Musculoskeletal:         General: Normal range of motion. Cervical back: Normal range of motion and neck supple. Right lower leg: No edema. Left lower leg: No edema. Skin:     General: Skin is warm and dry. Capillary Refill: Capillary refill takes less than 2 seconds. Neurological:      General: No focal deficit present. Mental Status: He is alert. Mental status is at baseline. He is disoriented. Deep Tendon Reflexes: Reflexes are normal and symmetric. Comments: Baseline dementia although does respond to questions   Psychiatric:         Attention and Perception: Attention normal.         Mood and Affect: Mood normal.         Speech: Speech normal.         Behavior: Behavior normal.         Thought Content: Thought content normal.         Cognition and Memory: Cognition is impaired.          Judgment: Judgment normal.                  Medications           Current Facility-Administered Medications   Medication Dose Route Frequency    ampicillin (OMNIPEN) 2 g in 0.9% sodium chloride (MBP/ADV) 100 mL MBP  2 g IntraVENous Q6H    dextrose 5% - 0.45% NaCl with KCl 20 mEq/L infusion  50 mL/hr IntraVENous CONTINUOUS    loperamide (IMODIUM) capsule 2 mg  2 mg Oral Q4H PRN    vancomycin (FIRVANQ) 50 mg/mL oral solution 250 mg  250 mg Oral Q6H    aspirin delayed-release tablet 81 mg  81 mg Oral DAILY    atorvastatin (LIPITOR) tablet 10 mg  10 mg Oral QHS    cyanocobalamin (VITAMIN B12) tablet 250 mcg  250 mcg Oral DAILY    cholecalciferol (VITAMIN D3) (1000 Units /25 mcg) tablet 1,000 Units  1,000 Units Oral DAILY    sodium chloride (NS) flush 5-10 mL  5-10 mL IntraVENous PRN    sodium chloride (NS) flush 5-40 mL  5-40 mL IntraVENous Q8H    sodium chloride (NS) flush 5-40 mL  5-40 mL IntraVENous PRN    acetaminophen (TYLENOL) tablet 650 mg  650 mg Oral Q6H PRN    Or    acetaminophen (TYLENOL) suppository 650 mg  650 mg Rectal Q6H PRN    polyethylene glycol (MIRALAX) packet 17 g  17 g Oral DAILY PRN    ondansetron (ZOFRAN ODT) tablet 4 mg  4 mg Oral Q8H PRN    Or    ondansetron (ZOFRAN) injection 4 mg  4 mg IntraVENous Q6H PRN    [Held by provider] losartan (COZAAR) tablet 100 mg  100 mg Oral DAILY    LORazepam (ATIVAN) injection 1 mg  1 mg IntraVENous Q6H PRN         Allergies         Oxycontin [oxycodone] and Vicodin [hydrocodone-acetaminophen]       Labs/Imaging/Diagnostics      Labs:  Recent Results (from the past 24 hour(s))   MAGNESIUM    Collection Time: 10/05/21  5:09 AM   Result Value Ref Range    Magnesium 1.9 1.7 - 2.8 mg/dL        Trended key labs include:  Recent Labs     10/04/21  0340 10/03/21  0300   WBC 31.7* 35.0*   HGB 10.7* 10.3*   HCT 30.6* 29.4*   * 551*     Recent Labs     10/05/21  0509 10/04/21  0340 10/03/21  0300   NA  --  143 146*   K  --  4.5 4.4   CL  --  116* 120*   CO2  --  21 18*   GLU  --  105 141*   BUN  --  38* 40*   CREA  --  1.00 0.90   CA  --  7.8* 7.8*   MG 1.9 1.9 1.9   PHOS  --  1.7* 1.9*       Imaging Last 24 Hours:  No results found.     Assessment//Plan           Patient Active Problem List    Diagnosis Date Noted    Sepsis due to urinary tract infection ([facility] [address] 09/29/2021    Elevated troponin 09/29/2021    Acute kidney injury ([facility] [address] 09/29/2021    Failure to thrive in adult 09/30/2021    Severe protein-calorie malnutrition ([facility] [address] 09/30/2021    Septic shock ([facility] [address] 09/29/2021    Bradycardia 08/27/2021    Hypothermia 08/27/2021    UTI (urinary tract infection) 08/27/2021    Fall 08/27/2021    Septic shock due to urinary tract infection ([email or web address] 08/27/2021    DM (diabetes mellitus) ([email or web address]     History of DVT (deep vein thrombosis)     VLADIMIR (acute kidney injury) ([email or web address]     Troponin I above reference range 08/11/2021    Failure to thrive (0-17) 08/10/2021    Multiple falls 08/10/2021    Ambulatory dysfunction 08/10/2021    Syncope 08/10/2021    Hypertension 08/10/2021    Hypercholesteremia 08/10/2021            Principle Problems:  Sepsis due to cdif colitis with leucocytosis and elevated BUN   Stopped Levaquin and cefepime   Continue with PO Vancomycin course, Continue with IVF support  Continue monitor lytes and replace K   WBC still elevated 31K    Enterococcus UTI  Had his james changed out on admission  Discontinue IV vancomycin and change to IV ampicillin for more narrow antibiotic therapy. Acute kidney injury ([facility] [address]  Acute kidney injury related to sepsis dehydration  Treated with IVF support, hold ARB, d/c IV Vanc   Significant improved with improved creatinine noted.     Elevated troponin  Troponin elevated suspected from VLADIMIR  No chest pain was reported  ACS or NSTEMI not suspected at this time  Continue with ASA/ HI Statin     Urethral bleeding  James changed out, holding heparin may resume when urine cleared     Failure to thrive in adult, severe malnutrition   Patient made DNR status after conversation with wife  Nutritionist consulted       Code status: DNR      Prophylaxis: Held due to blood in urinary catheter      Clinical time 50 minutes with >50% of visit spent in counseling and coordination of care      Electronically signed by ANDI Kimball on 10/6/2021 at 1:53 AM

## 2021-10-06 NOTE — PROGRESS NOTES
Comprehensive Nutrition Assessment    Type and Reason for Visit: reassessment    Nutrition Recommendations/Plan: regular diet to encourage po intake if BG is > 150 then recommend change to 4 CHO choice meals  Provide glucerna BID and gelatein 20 with lunch trays    Nutrition Assessment:  [de-identified] yo male PMH: benign neoplasm of colon, depression, DM, HTN, HLD, dementia   underweight BMI 20.9 for age of 74 yo or >. admitted due to sepsis 2/2 UTI and + C-diff. Consulted for failure to thrive pt is thin and bedbound. pressure injury to right heel. Albumin low 2.2  Being treated with IVF and IV ABx for UTI, C-diff starting PO Vancomycin. For additional protein calories trying glucerna BID and gelatein 20 daily but due to dementia PO intake may vary or not improve. Currently on regular diet to encourage PO intake and currently pt BG is well controlled. If BG becomes elevated > 150 then can change to CHO restricted diet. 10/3/2021 Hospital day 4. Pt DNR plan is home vs [facility] hospice. Per RN pt is only nibbling on meals and eats ~25% but is drinking 100% of glucerna. . Continues to have loose stools related to C-diff receiving vancomycin for C-diff and UTI.     10/6/2021 day 7 Still eating 1-25% of meals continue ONS as pt was taking supplements better. Pt with dementia. C-diff continues IVF, PO vancomycin, For UTI IV ampicillin. BG remains well controlled.      Recent Results (from the past 24 hour(s))   MAGNESIUM    Collection Time: 10/06/21  4:30 AM   Result Value Ref Range    Magnesium 1.9 1.7 - 2.8 mg/dL   CBC WITH AUTOMATED DIFF    Collection Time: 10/06/21  5:01 AM   Result Value Ref Range    WBC 29.7 (H) 4.6 - 13.2 K/uL    RBC 3.65 (L) 4.35 - 5.65 M/uL    HGB 10.9 (L) 13.0 - 16.0 g/dL    HCT 30.9 (L) 36.0 - 48.0 %    MCV 84.7 78.0 - 100.0 FL    MCH 29.9 24.0 - 34.0 PG    MCHC 35.3 31.0 - 37.0 g/dL    RDW 15.8 (H) 11.6 - 14.5 %    PLATELET 783 (H) 205 - 420 K/uL    MPV 11.4 9.2 - 11.8 FL    NRBC 0.0 0.0  WBC ABSOLUTE NRBC 0.00 0.00 - 0.01 K/uL    NEUTROPHILS 82 (H) 40 - 73 %    BAND NEUTROPHILS 5 0 - 5 %    LYMPHOCYTES 10 (L) 21 - 52 %    MONOCYTES 1 (L) 3 - 10 %    EOSINOPHILS 1 0 - 5 %    BASOPHILS 0 0 - 2 %    MYELOCYTES 1 (H) 0 %    IMMATURE GRANULOCYTES 0 %    ABS. NEUTROPHILS 25.8 (H) 1.8 - 8.0 K/UL    ABS. LYMPHOCYTES 3.0 0.9 - 3.6 K/UL    ABS. MONOCYTES 0.3 0.05 - 1.2 K/UL    ABS. EOSINOPHILS 0.3 0.0 - 0.4 K/UL    ABS. BASOPHILS 0.0 0.0 - 0.1 K/UL    ABS. IMM. GRANS. 0.0 K/UL    DF Manual      PLATELET COMMENTS Large Platelets      RBC COMMENTS Normocytic, Normochromic         Malnutrition Assessment:  Malnutrition Status:  Severe malnutrition    Context:  Acute illness     Findings of the 6 clinical characteristics of malnutrition:   Energy Intake:  7 - 50% or less of est energy requirements for 5 or more days (currently with C-diff and hx of dementia failure to thrive)  Weight Loss:  Unable to assess     Body Fat Loss:  7 - Moderate body fat loss, Triceps, Orbital, Fat overlying ribs, Buccal region   Muscle Mass Loss:  7 - Moderate muscle mass loss, Temples (temporalis), Thigh (quadriceps), Scapula (trapezius), Hand (interosseous), Clavicles (pectoralis & deltoids), Calf  Fluid Accumulation:  No significant fluid accumulation,     Strength:  Not performed         Estimated Daily Nutrient Needs:  Energy (kcal): 6663-7626 kcal/day; Weight Used for Energy Requirements: Admission (60 kg)  Protein (g): 60-72 g/day; Weight Used for Protein Requirements: Admission (1-1.2 g/kg)  Fluid (ml/day): 4579-2947 mL/day; Method Used for Fluid Requirements: 1 ml/kcal      Nutrition Related Findings:  underweight BMI 20.9 for age of 72 yo or >. admitted due to sepsis 2/2 UTI and + C-diff. Consulted for failure to thrive pt is thin and bedbound. pressure injury to right heel. Wounds:    Pressure injury (righ heel)       Current Nutrition Therapies:  ADULT DIET Regular  ADULT ORAL NUTRITION SUPPLEMENT Lunch;  Other Supplement; gelatein 20  ADULT ORAL NUTRITION SUPPLEMENT Breakfast, Dinner; Diabetic Supplement    Anthropometric Measures:  · Height:  5' 6\" (167.6 cm)  · Current Body Wt:  59 kg (130 lb)   · Admission Body Wt:  130 lb    · Usual Body Wt:        · Ideal Body Wt:  142 lbs:  91.5 %   · Adjusted Body Weight:   ; Weight Adjustment for: No adjustment   · Adjusted BMI:       · BMI Category:  Underweight (BMI less than 22) age over 72       Nutrition Diagnosis:   · Inadequate protein-energy intake, Altered GI function, Increased nutrient needs related to altered GI function, cognitive or neurological impairment, impaired nutrient utilization, inadequate protein-energy intake, increased demand for energy/nutrients as evidenced by diarrhea, GI abnormality, lab values, poor intake prior to admission      Nutrition Interventions:   Food and/or Nutrient Delivery: Continue current diet, Start oral nutrition supplement  Nutrition Education and Counseling: Education not appropriate  Coordination of Nutrition Care: Continue to monitor while inpatient    Goals:  albumin > 3.5, Pt to eat > 75% of meals and supplements, BM q 1-3 days, BMI 22-25 for adults > 71 yo, prevent skin breakdown       Nutrition Monitoring and Evaluation:   Behavioral-Environmental Outcomes:    Food/Nutrient Intake Outcomes: Food and nutrient intake, Supplement intake, Diet advancement/tolerance  Physical Signs/Symptoms Outcomes: Diarrhea, Weight, Skin, Meal time behavior, Biochemical data     F/U 10/10/2021    Discharge Planning:     Too soon to determine     Electronically signed by Yifan Palma on 10/6/2021 at 4:26 PM    Contact: CELIA [phone]

## 2021-10-07 VITALS
HEIGHT: 66 IN | RESPIRATION RATE: 19 BRPM | WEIGHT: 123.4 LBS | OXYGEN SATURATION: 98 % | BODY MASS INDEX: 19.83 KG/M2 | HEART RATE: 60 BPM | DIASTOLIC BLOOD PRESSURE: 52 MMHG | TEMPERATURE: 97.4 F | SYSTOLIC BLOOD PRESSURE: 133 MMHG

## 2021-10-07 LAB — MAGNESIUM SERPL-MCNC: 1.9 MG/DL (ref 1.7–2.8)

## 2021-10-07 PROCEDURE — 74011000258 HC RX REV CODE- 258: Performed by: NURSE PRACTITIONER

## 2021-10-07 PROCEDURE — 36415 COLL VENOUS BLD VENIPUNCTURE: CPT

## 2021-10-07 PROCEDURE — 74011250637 HC RX REV CODE- 250/637: Performed by: NURSE PRACTITIONER

## 2021-10-07 PROCEDURE — 74011250636 HC RX REV CODE- 250/636: Performed by: NURSE PRACTITIONER

## 2021-10-07 PROCEDURE — 83735 ASSAY OF MAGNESIUM: CPT

## 2021-10-07 PROCEDURE — 74011250637 HC RX REV CODE- 250/637: Performed by: INTERNAL MEDICINE

## 2021-10-07 RX ORDER — VANCOMYCIN HYDROCHLORIDE 250 MG/5ML
125 POWDER, FOR SOLUTION ORAL EVERY 6 HOURS
Qty: 70 ML | Refills: 0 | Status: SHIPPED | OUTPATIENT
Start: 2021-10-07 | End: 2021-10-14

## 2021-10-07 RX ADMIN — Medication 1000 UNITS: at 08:13

## 2021-10-07 RX ADMIN — AMPICILLIN SODIUM 2 G: 2 INJECTION, POWDER, FOR SOLUTION INTRAMUSCULAR; INTRAVENOUS at 02:00

## 2021-10-07 RX ADMIN — VANCOMYCIN HYDROCHLORIDE 250 MG: KIT at 01:59

## 2021-10-07 RX ADMIN — ASPIRIN 81 MG: 81 TABLET, COATED ORAL at 08:13

## 2021-10-07 RX ADMIN — VANCOMYCIN HYDROCHLORIDE 250 MG: KIT at 08:12

## 2021-10-07 RX ADMIN — AMPICILLIN SODIUM 2 G: 2 INJECTION, POWDER, FOR SOLUTION INTRAMUSCULAR; INTRAVENOUS at 08:12

## 2021-10-07 RX ADMIN — CYANOCOBALAMIN TAB 500 MCG 250 MCG: 500 TAB at 08:13

## 2021-10-07 NOTE — PROGRESS NOTES
Pt unable to participate/declined PT due to:  []  Nausea/vomiting  []  Eating  []  Pain  [x]  Pt lethargic  [x]  Not feeling well  Educated Pt on the importance of Skilled PT however patient continued to decline  Will f/u later as schedule allows. Thank you.   Marguerite Pride, PTA

## 2021-10-07 NOTE — PROGRESS NOTES
8855- bedside shift report completed and care of the patient assumed    0730- brief changed due to fecal incontinence, turned and repositioned, patient only wants glucerna for breakfast    0812- AM medications administered    0940- spoke to Case Management, d/c scheduled today 1400 with transport, wife was notified    1200- assisted with meal    1344- brief changed due to fecal incontinence. 1445 - patient brief was changed due to fecal incontince, pt was picked up by [facility] ambulance transported back home, received all d/c documents and personal belonging with him.

## 2021-10-07 NOTE — DISCHARGE INSTRUCTIONS
Patient Education        Sepsis: Care Instructions  Overview     Sepsis is an intense reaction to an infection. It can cause damage to the body and lead to dangerously low blood pressure. You may have inflammation across large areas of your body. It can damage tissue and even go deep into your organs. Infections that can lead to sepsis include:  · A skin infection such as from a cut. · A lung infection like pneumonia. · A kidney infection. · A gut infection such as E. coli. Sepsis is treated with antibiotics. Your doctor will try to find the infection that led to sepsis. Anay Ruano also get fluids through a vein (IV). Machines will track your vital signs, including temperature, blood pressure, breathing rate, and pulse rate. The physical and mental effects of sepsis may not be seen for several weeks after treatment. And they may last long after the infection is gone. Physical problems may include:  · Feeling weak and tired. · Feeling out of breath. · Aches and pains. · Problems with getting around. · Trouble falling asleep or staying asleep. · Dry and itchy skin, brittle nails, and hair loss. Some of these effects can lead to problems with your organs or your feet, legs, hands, or arms. Sepsis can also affect your mind and emotions. Problems may include:  · Self-doubt. · Anxiety. · Nightmares. · Depression and mood problems. · Wanting to avoid other people. · Confusion. · Flashbacks and bad memories of your illness. It's important to care for yourself and try to avoid infections. This may lower your risk of getting sepsis again. Follow-up care is a key part of your treatment and safety. Be sure to make and go to all appointments, and call your doctor if you are having problems. It's also a good idea to know your test results and keep a list of the medicines you take. How can you care for yourself at home? · Be safe with medicines. Take your medicines exactly as prescribed.  Call your doctor if you think you are having a problem with your medicine. · If your doctor prescribed antibiotics, take them as directed. Do not stop taking them just because you feel better. You need to take the full course of antibiotics. · Help prevent infections that could again lead to sepsis. ? Try to avoid colds and flu. If you must be around people who have a cold or the flu, wash your hands often. And get a flu vaccine every year. ? Ask your doctor if you need a pneumococcal vaccine (to prevent pneumonia, meningitis, and other infections). If you have had one before, ask your doctor if you need another dose. ? Clean any wounds or scrapes. · Do not smoke or use other tobacco products. When you quit smoking, you are less likely to get a cold, the flu, bronchitis, and pneumonia. If you need help quitting, talk to your doctor about stop-smoking programs and medicines. These can increase your chances of quitting for good. · Drink plenty of fluids to prevent dehydration. Choose water and other clear liquids until you feel better. If you have kidney, heart, or liver disease and have to limit fluids, talk with your doctor before you increase the amount of fluids you drink. · Eat a healthy diet. Include fruits, vegetables, and whole grains in your diet every day. · If your doctor recommends it, try doing some physical activity. Walking is a good choice. Bit by bit, increase the amount you walk every day. · Talk with your family and friends about your challenges. Ask for help if you need it. · Keep a journal. Writing down your thoughts and feelings can help reduce your stress. · Ask family members to fill in gaps in your memory. · Set small goals for yourself that you can reach. Reward yourself for success. When should you call for help? Call 911  anytime you think you may need emergency care. For example, call if:    · You passed out (lost consciousness).    Call your doctor now or seek immediate medical care if:    · You have symptoms such as:  ? Shortness of breath. ? Feeling very sick. ? Severe pain. ? A fast heart rate. ? Cool, pale, or clammy skin. ? Feeling confused. ? Feeling very sleepy, or you are hard to wake up.     · You are dizzy or lightheaded, or you feel like you may faint.     · You have a fever or chills. Watch closely for changes in your health, and be sure to contact your doctor if:    · You do not get better as expected. Where can you learn more? Go to http://www.gray.com/  Enter T383 in the search box to learn more about \"Sepsis: Care Instructions. \"  Current as of: July 1, 2021               Content Version: 13.0  © 2006-2021 [employer]. Care instructions adapted under license by [employer] (which disclaims liability or warranty for this information). If you have questions about a medical condition or this instruction, always ask your healthcare professional. [place] [address] any warranty or liability for your use of this information. Patient Education        Urinary Tract Infections (UTI) in Men: Care Instructions  Overview     A urinary tract infection, or UTI, is a term for an infection anywhere between the kidneys and the urethra. (The urethra is the tube that carries urine from the bladder to outside the body.) Most UTIs are bladder infections. They often cause pain or burning when you urinate. UTIs are caused by bacteria. This means they can be cured with antibiotics. Be sure to complete your treatment so that the infection does not get worse. Follow-up care is a key part of your treatment and safety. Be sure to make and go to all appointments, and call your doctor if you are having problems. It's also a good idea to know your test results and keep a list of the medicines you take. How can you care for yourself at home? · Take your antibiotics as prescribed. Do not stop taking them just because you feel better. You need to take the full course of antibiotics. · Take your medicines exactly as prescribed. Your doctor may have prescribed a medicine, such as phenazopyridine (Pyridium), to help relieve pain when you urinate. This turns your urine orange. You may stop taking it when your symptoms get better. But be sure to take all of your antibiotics, which treat the infection. · Drink extra water for the next day or two. This will help make the urine less concentrated and help wash out the bacteria causing the infection. (If you have kidney, heart, or liver disease and have to limit your fluids, talk with your doctor before you increase your fluid intake.)  · Avoid drinks that are carbonated or have caffeine. They can irritate the bladder. · Urinate often. Try to empty your bladder each time. · To relieve pain, take a hot bath or lay a heating pad (set on low) over your lower belly or genital area. Never go to sleep with a heating pad in place. To help prevent UTIs  · Drink plenty of fluids. If you have kidney, heart, or liver disease and have to limit fluids, talk with your doctor before you increase the amount of fluids you drink. · Urinate when you have the urge. Do not hold your urine for a long time. Urinate before you go to sleep. · Keep your penis clean. Catheter care  If you have a drainage tube (catheter) in place, the following steps will help you care for it. · Always wash your hands before and after touching your catheter. · Check the area around the urethra for inflammation or signs of infection. Signs of infection include irritated, swollen, red, or tender skin, or pus around the catheter. · Clean the area around the catheter with soap and water two times a day. Dry with a clean towel afterward. · Do not apply powder or lotion to the skin around the catheter. To empty the urine collection bag   · Wash your hands with soap and water.   · Without touching the drain spout, remove the spout from its sleeve at the bottom of the collection bag. Open the valve on the spout. · Let the urine flow out of the bag and into the toilet or a container. Do not let the tubing or drain spout touch anything. · After you empty the bag, clean the end of the drain spout with tissue and water. Close the valve and put the drain spout back into its sleeve at the bottom of the collection bag. · Wash your hands with soap and water. When should you call for help? Call your doctor now or seek immediate medical care if:    · Symptoms such as a fever, chills, nausea, or vomiting get worse or happen for the first time.     · You have new pain in your back just below your rib cage. This is called flank pain.     · There is new blood or pus in your urine.     · You are not able to take or keep down your antibiotics. Watch closely for changes in your health, and be sure to contact your doctor if:    · You are not getting better after taking an antibiotic for 2 days.     · Your symptoms go away but then come back. Where can you learn more? Go to http://www.gray.com/  Enter N825 in the search box to learn more about \"Urinary Tract Infections (UTI) in Men: Care Instructions. \"  Current as of: February 10, 2021               Content Version: 13.0  © 2006-2021 [employer]. Care instructions adapted under license by [email or web address] (which disclaims liability or warranty for this information). If you have questions about a medical condition or this instruction, always ask your healthcare professional. [place] [address] any warranty or liability for your use of this information.

## 2021-10-07 NOTE — DISCHARGE SUMMARY
Discharge Summary       PATIENT ID: Cherry Cleveland  MRN: [id number]   YOB: 1940    DATE OF ADMISSION: 9/29/2021  4:31 PM    DATE OF DISCHARGE: 10/07/21    PRIMARY CARE PROVIDER: Christopher Marie MD     ATTENDING PHYSICIAN: Pastor Ysabel MD  DISCHARGING PROVIDER: Pastor Ysabel MD        CONSULTATIONS: IP CONSULT TO CARDIOLOGY    PROCEDURES/SURGERIES: * No surgery found *    ADMITTING [address] [place] [address] COURSE:   Cherry Cleveland is a [de-identified] y.o. male  has a past medical history of Abnormality of gait, Benign neoplasm of colon, Depression, DM (diabetes mellitus) ([facility] [address] Headache, Hypertension, Nonspecific (abnormal) findings on radiological and other examination of genitourinary organs, Other and unspecified hyperlipidemia, Personal history of fall, and Right bundle branch block and left anterior fascicular block. Patient seen at bedside. Patient has baseline dementia oriented to person not place or time. Patient is very thin bedbound. Patient does answer questions that he is not in any pain. Patient was determined to have sepsis related to urinary tract infection in the emergency department was given Levaquin and cefepime. IV fluid was given. Levophed drip was considered but was not started. Patient's white blood cell count is significantly high at 49.2 with 4% bands. Patient is considered septic at this time no fever was reported. He was not tachycardic. But given his significant UTI and leukocytosis he will be treated for sepsis. IV antibiotics will be continued IV fluid will be continued. Blood pressure will be monitored Levophed drip will be started as needed.     Discussion with patient's wife via the telephone. She stated patient was on hospice at home but she wants us to save his life. I did discuss CPR and intubation and she did not want either 1 of those for her . Patient was then made a DNR status.   I explained to his wife what a DNR status was that we would still continue antibiotics fluids and life-saving efforts.     I will place occupational and physical therapy consults Case management consult nutritional consult. Nephrology consult is also appreciated for acute kidney injury. DISCHARGE DIAGNOSES / PLAN:      Principle Problems:  Sepsis due to cdif colitis with leucocytosis and elevated BUN   Stopped Levaquin and cefepime   Continue with PO Vancomycin course, Continue with IVF support  Continue monitor lytes and replace K   WBC still elevated 31K     Enterococcus UTI  Had his james changed out on admission  Discontinue IV vancomycin and change to IV ampicillin for more narrow antibiotic therapy.     Acute kidney injury ([address]  Acute kidney injury related to sepsis dehydration  Treated with IVF support, hold ARB, d/c IV Vanc   Significant improved with improved creatinine noted.     Elevated troponin  Troponin elevated suspected from VLADIMIR  No chest pain was reported  ACS or NSTEMI not suspected at this time  Continue with ASA/ HI Statin      Urethral bleeding  James changed out, holding heparin may resume when urine cleared     Failure to thrive in adult, severe malnutrition   Patient made DNR status after conversation with wife  Nutritionist consulted         Code status: DNR         Pt is stable today, wbc slow to improved, finished treatment of uti, he will finish course po vanco at home for cdif, his diarrhea pretty much resolved  Total dc time 35 mins    FOLLOW UP APPOINTMENTS:    Follow-up Information     Follow up With Specialties Details Why Contact Info    Royal Terrie MD Family Medicine On 10/21/2021 @ 3:45 [address] [address]  [phone]               DIET: regular for comfort      DISCHARGE MEDICATIONS:  Current Discharge Medication List      START taking these medications    Details   vancomycin (FIRVANQ) 50 mg/mL oral solution Take 2.5 mL by mouth every six (6) hours for 7 days.   Qty: 70 mL, Refills: 0  Start date: 10/7/2021, End date: 10/14/2021         CONTINUE these medications which have NOT CHANGED    Details   amLODIPine (NORVASC) 10 mg tablet Take 10 mg by mouth daily. hydrALAZINE (APRESOLINE) 50 mg tablet Take 50 mg by mouth three (3) times daily. morphine, 10 mg/5 mL, 10 mg/5 mL oral solution Take 0.25 mL by mouth every  one (1) hour. LORazepam (INTENSOL) 2 mg/mL concentrated solution Take 0.5 mL by mouth every four (4) hours as needed for Agitation, Anxiety or Restlessness. Max Daily Amount: 6 mg. Qty: 5 mL, Refills: 0    Associated Diagnoses: Failure to thrive (0-17)      cyanocobalamin (Vitamin B-12) 100 mcg tablet Take 100 mcg by mouth daily. cholecalciferol (Vitamin D3) 25 mcg (1,000 unit) cap Take 1,000 Units by mouth daily. simvastatin (ZOCOR) 20 mg tablet Take 1 Tablet by mouth nightly. Qty: 30 Tablet, Refills: 1         STOP taking these medications       losartan (COZAAR) 100 mg tablet Comments:   Reason for Stopping:         chlorthalidone (HYGROTEN) 50 mg tablet Comments:   Reason for Stopping:         apixaban (ELIQUIS) 2.5 mg tablet Comments:   Reason for Stopping:                 NOTIFY YOUR PHYSICIAN FOR ANY OF THE FOLLOWING:   Fever over 101 degrees for 24 hours. Chest pain, shortness of breath, fever, chills, nausea, vomiting, diarrhea, change in mentation, falling, weakness, bleeding. Severe pain or pain not relieved by medications. Or, any other signs or symptoms that you may have questions about. PHYSICAL EXAMINATION AT DISCHARGE:  General:          Alert, cooperative, no distress, appears stated age. HEENT:           Atraumatic, anicteric sclerae, pink conjunctivae                          No oral ulcers, mucosa moist, throat clear, dentition fair  Neck:               Supple, symmetrical  Lungs:             Clear to auscultation bilaterally. No Wheezing or Rhonchi. No rales. Chest wall:      No tenderness  No Accessory muscle use.   Heart: Regular  rhythm,  No  murmur   No edema  Abdomen:        Soft, non-tender. Not distended. Bowel sounds normal  Extremities:     No cyanosis. No clubbing,                            Skin turgor normal, Capillary refill normal  Skin:                Not pale. Not Jaundiced  No rashes   Psych:             Not anxious or agitated. Neurologic:      Alert, moves all extremities, dementia      CHRONIC MEDICAL DIAGNOSES:  Problem List as of 10/7/2021 Date Reviewed: 1/23/2017        Codes Class Noted - Resolved    Failure to thrive in adult ICD-10-CM: R62.7  ICD-9-CM: 783.7  9/30/2021 - Present        Severe protein-calorie malnutrition ([facility] ICD-10-CM: E43  ICD-9-CM: 262  9/30/2021 - Present        Acute kidney injury ([facility] ICD-10-CM: N17.9  ICD-9-CM: 584.9  9/29/2021 - Present        Septic shock ([facility] ICD-10-CM: A41.9, R65.21  ICD-9-CM: 038.9, 785.52, 995.92  9/29/2021 - Present        Elevated troponin ICD-10-CM: R77.8  ICD-9-CM: 790.6  9/29/2021 - Present        Sepsis due to urinary tract infection ([facility] ICD-10-CM: A41.9, N39.0  ICD-9-CM: 038.9, 995.91, 599.0  9/29/2021 - Present        Bradycardia ICD-10-CM: R00.1  ICD-9-CM: 427.89  8/27/2021 - Present        Hypothermia ICD-10-CM: T68. Yenni Mauro  ICD-9-CM: 991.6  8/27/2021 - Present        UTI (urinary tract infection) ICD-10-CM: N39.0  ICD-9-CM: 599.0  8/27/2021 - Present        Fall ICD-10-CM: W19. Yenni Mauro  ICD-9-CM: E888.9  8/27/2021 - Present        Septic shock due to urinary tract infection (HCC) ICD-10-CM: A41.9, R65.21, N39.0  ICD-9-CM: 599.0, 995.92, 785.52  8/27/2021 - Present        DM (diabetes mellitus) (HCC) ICD-10-CM: E11.9  ICD-9-CM: 250.00  Unknown - Present        History of DVT (deep vein thrombosis) ICD-10-CM: N04.968  ICD-9-CM: V12.51  Unknown - Present        VLADIMIR (acute kidney injury) ([facility] [address] ICD-10-CM: N17.9  ICD-9-CM: 274. 9  Unknown - Present        Troponin I above reference range ICD-10-CM: R77.8  ICD-9-CM: 790.6  8/11/2021 - Present Failure to thrive (0-17) ICD-10-CM: R62.51  ICD-9-CM: 783.41  8/10/2021 - Present        Multiple falls ICD-10-CM: R29.6  ICD-9-CM: V15.88  8/10/2021 - Present        Ambulatory dysfunction ICD-10-CM: R26.2  ICD-9-CM: 719.7  8/10/2021 - Present        Syncope ICD-10-CM: R55  ICD-9-CM: 780.2  8/10/2021 - Present        Hypertension ICD-10-CM: I10  ICD-9-CM: 401.9  8/10/2021 - Present        Hypercholesteremia ICD-10-CM: E78.00  ICD-9-CM: 272.0  8/10/2021 - Present              Greater than 35 minutes were spent with the patient on counseling and coordination of care    Signed:   Ronak Miller MD  10/7/2021  12:27 PM

## 2021-10-07 NOTE — PROGRESS NOTES
Problem: Urinary Tract Infection - Adult  Goal: *Absence of infection signs and symptoms  Outcome: Progressing Towards Goal     Problem: Patient Education: Go to Patient Education Activity  Goal: Patient/Family Education  Outcome: Progressing Towards Goal     Problem: Pressure Injury - Risk of  Goal: *Prevention of pressure injury  Description: Document Malcom Scale and appropriate interventions in the flowsheet. Outcome: Progressing Towards Goal  Note: Pressure Injury Interventions:  Sensory Interventions: Assess changes in LOC    Moisture Interventions: Absorbent underpads    Activity Interventions: Assess need for specialty bed    Mobility Interventions: Assess need for specialty bed    Nutrition Interventions: Document food/fluid/supplement intake    Friction and Shear Interventions: Minimize layers                Problem: Patient Education: Go to Patient Education Activity  Goal: Patient/Family Education  Outcome: Progressing Towards Goal     Problem: Risk for Spread of Infection  Goal: Prevent transmission of infectious organism to others  Description: Prevent the transmission of infectious organisms to other patients, staff members, and visitors. Outcome: Progressing Towards Goal     Problem: Patient Education:  Go to Education Activity  Goal: Patient/Family Education  Outcome: Progressing Towards Goal     Problem: Patient Education: Go to Patient Education Activity  Goal: Patient/Family Education  Outcome: Progressing Towards Goal     Problem: Nutrition Deficit  Goal: *Optimize nutritional status  Outcome: Progressing Towards Goal     Problem: Falls - Risk of  Goal: *Absence of Falls  Description: Document Edu Nickerson Fall Risk and appropriate interventions in the flowsheet.   Outcome: Progressing Towards Goal  Note: Fall Risk Interventions:  Mobility Interventions: Bed/chair exit alarm    Mentation Interventions: Adequate sleep, hydration, pain control    Medication Interventions: Bed/chair exit alarm    Elimination Interventions: Call light in reach              Problem: Patient Education: Go to Patient Education Activity  Goal: Patient/Family Education  Outcome: Progressing Towards Goal

## 2021-10-07 NOTE — PROGRESS NOTES
1930   Received care of pt lying in bed with eyes closed. Pt awakens easily and denies any pain or discomfort. Routine assessment and vs completed. Call bell in reach and bed in lowest position. 2200  HS meds accepted without difficulty. 0100  Pt changed of large incont loose mucous stool. 0200  Sleeping with no distress noted. 0540  Pt cleansed of mod loose mucous stool. 0550  Pt has slept at long intervals and turned and repositioned for comfort frequently.

## 2021-10-07 NOTE — PROGRESS NOTES
Problem: Urinary Tract Infection - Adult  Goal: *Absence of infection signs and symptoms  10/7/2021 0928 by Amador Schrader  Outcome: Resolved/Met  10/7/2021 0928 by Amador Schrader  Outcome: Progressing Towards Goal     Problem: Patient Education: Go to Patient Education Activity  Goal: Patient/Family Education  10/7/2021 0928 by Amador Schrader  Outcome: Resolved/Met  10/7/2021 0928 by Amador Schrader  Outcome: Progressing Towards Goal     Problem: Pressure Injury - Risk of  Goal: *Prevention of pressure injury  Description: Document Malcom Scale and appropriate interventions in the flowsheet. 10/7/2021 0928 by Amador Schrader  Outcome: Resolved/Met  10/7/2021 0928 by Amador Schrader  Outcome: Progressing Towards Goal  Note: Pressure Injury Interventions:  Sensory Interventions: Assess changes in LOC    Moisture Interventions: Absorbent underpads    Activity Interventions: Assess need for specialty bed    Mobility Interventions: Assess need for specialty bed    Nutrition Interventions: Document food/fluid/supplement intake    Friction and Shear Interventions: Minimize layers                Problem: Patient Education: Go to Patient Education Activity  Goal: Patient/Family Education  10/7/2021 0928 by Amador Schrader  Outcome: Resolved/Met  10/7/2021 0928 by Amador Schrader  Outcome: Progressing Towards Goal     Problem: Risk for Spread of Infection  Goal: Prevent transmission of infectious organism to others  Description: Prevent the transmission of infectious organisms to other patients, staff members, and visitors.   10/7/2021 0928 by Amador Schrader  Outcome: Resolved/Met  10/7/2021 0928 by Amador Schrader  Outcome: Progressing Towards Goal     Problem: Patient Education:  Go to Education Activity  Goal: Patient/Family Education  10/7/2021 0928 by Amador Schrader  Outcome: Resolved/Met  10/7/2021 0928 by Amador Schrader  Outcome: Progressing Towards Goal     Problem: Patient Education: Go to Patient Education Activity  Goal: Patient/Family Education  10/7/2021 0928 by Candy Holt  Outcome: Resolved/Met  10/7/2021 0928 by Candy Holt  Outcome: Progressing Towards Goal     Problem: Nutrition Deficit  Goal: *Optimize nutritional status  10/7/2021 0928 by Candy Holt  Outcome: Resolved/Met  10/7/2021 0928 by Candy Holt  Outcome: Progressing Towards Goal     Problem: Falls - Risk of  Goal: *Absence of Falls  Description: Document Edu Nickerson Fall Risk and appropriate interventions in the flowsheet.   10/7/2021 0928 by Candy Holt  Outcome: Resolved/Met  10/7/2021 0928 by Candy Holt  Outcome: Progressing Towards Goal  Note: Fall Risk Interventions:  Mobility Interventions: Bed/chair exit alarm    Mentation Interventions: Adequate sleep, hydration, pain control    Medication Interventions: Bed/chair exit alarm    Elimination Interventions: Call light in reach              Problem: Patient Education: Go to Patient Education Activity  Goal: Patient/Family Education  10/7/2021 0928 by Candy Holt  Outcome: Resolved/Met  10/7/2021 0928 by Candy Holt  Outcome: Progressing Towards Goal

## 2021-10-08 NOTE — PROGRESS NOTES
Spoke with Ms Ann Lomax on the phone, she states that she is unable to afford a 200 dollar copay, for her husbands oral vanc prescribed yesterday, she states medicare wont pay for it eithert, I have explained to her that the other antibiotic option will be Fidaxomicin which is way more expensive and probaly less available. Will look into preauthorization with medicare, with case management assistant. I explained to her.

## 2021-10-09 NOTE — PROGRESS NOTES
Received a phone call from Mrs. Anibal Hoyt on 10/08/21 after she had received Mrs. Anibal Hoyt home. She stated that she \" could not believe that we sent him home without a bit of salve put on his testicles, and she was very unhappy. She wanted to know when [facility] was going to start. CM told her they would likely start on Sat. She began to speak loudly and told me she did not want HH until Monday morning. She told CM this over and over. Mrs. Anibal Hoyt was informed that [facility] would be calling to set up an appoint and she could let them know what time to come. She seemed to understand this.

## 2021-10-09 NOTE — PROGRESS NOTES
Received aphone call by Mrs. Yolande Mckenzie at this time. She was saying over and over, \" I don't want any [facility]\". She was trying to say that she didn't want to use another Hospice. Mrs. Yolande Mckenzie is her 's POA and is charge of his care. She stated that she wanted home health and home health only. She said this over and over, very loudly. I agreed with Mrs. Yolande Mckenzie as this was the [address] at [facility]. She thanked me and hung up.

## 2021-10-11 NOTE — PROGRESS NOTES
[facility] [phone] several times and was unable to get through. The recording stated they were having technical difficulties and to call back later.

## 2024-05-29 NOTE — PROGRESS NOTES
Comprehensive Nutrition Assessment    Type and Reason for Visit: reassessment    Nutrition Recommendations/Plan: 4 CHO choice with cardiac restriction and provide glucerna BID    Nutrition Assessment:  [de-identified] yo male PMH: dementia, type DM, HTN, DVT   fall at home came to ED having sepsis 2/2 UTI but also COVID R/O. Pt with dementia not reliable historian. BMI 20.1  BG elevated 271 then down to 67 modify to 4 CHO choice cardiac diet for consistent CHO servings. Start ONS glucerna due to increased needs related to sepsis. 8/30/2021 PO intake is variable pt with episodes of confusion eating 26-75% of meals on average has glucerna BID ordered. Still receiving antibiotics for UTI and cellulitis. BG is better controlled SSI. No hgb A1c available. Pt having consistent BM    9/3/2021 pt has decided hospice per MD note wants to be discharged home and not spend rest of life in hospital or LTC. Has been eating 51-75% of meals and % of supplement.      Recent Results (from the past 24 hour(s))   GLUCOSE, POC    Collection Time: 09/02/21  3:42 PM   Result Value Ref Range    Glucose (POC) 77 70 - 110 mg/dL    Performed by [address] [place] [address], POC    Collection Time: 09/02/21  9:41 PM   Result Value Ref Range    Glucose (POC) 296 (H) 70 - 110 mg/dL    Performed by [id number].    Collection Time: 09/03/21  4:30 AM   Result Value Ref Range    Magnesium 1.8 1.7 - 2.8 mg/dL   GLUCOSE, POC    Collection Time: 09/03/21  7:03 AM   Result Value Ref Range    Glucose (POC) 150 (H) 70 - 110 mg/dL    Performed by Zhao Parnell    GLUCOSE, POC    Collection Time: 09/03/21 10:54 AM   Result Value Ref Range    Glucose (POC) 169 (H) 70 - 110 mg/dL    Performed by Kentrell Morales        Malnutrition Assessment:  Malnutrition Status:  Insufficient data (Pt dementia and COVID rule out)    Context:  Acute illness     Findings of the 6 clinical characteristics of malnutrition:   Energy Intake:  Unable to assess  Weight Loss: Received a call from the patient and his girlfriend regarding low sugar reading on his Laura 2.   Patient is a Type 2 DM, last seen by Yuki Hendrix on 03/2024.     Most of history provided by the patient Girlfriend, Mirian while he was on the phone call as well.     Notably, patient was in 200s on May 27th Dinner time. He took 4 units of Novolog. After that the  patient reports that, overnight his CGM reading( Laura 2 ) went to as low as 58 around 2am on 28th May. He felt dizzy and weak. He took orange juice and the sugars improved to 70s. However, have been in range of 70-80s  since then. He reports having another sugar reading on his CGM around  2pm in 50s. He states that since 2pm he has taken 10 glucose tablets, several glasses of orange juice. However sugars still in 70s. Last meal was around 8pm.       Patient reports that earlier in the day when he tried a fingerstick glcuometer to correlate the number with CGM reading, they seemed to be equal. However, his recent fingerstick reading is 400 while he is still in 70s on CGM.     My suspicion is the glucometer reading of 400 is accurate rather than the CGM read. Discussed with the patient and girlfriend, and they are more comfortable  going to the ER and getting evaluated rather than just estimating things.     Recommendations:   - Would recommend going to the ER to get evaluated   - Would recommend getting a fingerstick glucose .   -If the sugars are above 250, would recommend obtaining a BMP and ketones to make sure patient is not in DKA.   - Patient has not taken the Lantus for more than 24 hours, if the sugars on fingerstick glucose are more than 200s, okay to take the home dose lantus.   - Further management per ER.   - If the discrepancy between the FSG and CGM is true, then will recommend calling Laura customer service for new sensors.       Lance Cole   Endocrine Fellow       Unable to assess     Body Fat Loss:  Unable to assess,     Muscle Mass Loss:  Unable to assess,    Fluid Accumulation:  Unable to assess,     Strength:  Not performed         Estimated Daily Nutrient Needs:  Energy (kcal): 1648-2815 kcal/day; Weight Used for Energy Requirements: Admission (60 kg)  Protein (g): 60-72 g/day; Weight Used for Protein Requirements: Admission (1-1.2 g/kg)  Fluid (ml/day): 6008-9765 mL/day; Method Used for Fluid Requirements: 1 ml/kcal      Nutrition Related Findings:  fall at home came to ED having sepsis 2/2 UTI but also COVID R/O. Pt with dementia not reliable historian. BMI 20.1    Pt has decided hospice  Wounds:    None       Current Nutrition Therapies:  ADULT DIET Regular  ADULT ORAL NUTRITION SUPPLEMENT Breakfast, Dinner; Diabetic Supplement    Anthropometric Measures:  · Height:  5' 6\" (167.6 cm)  · Current Body Wt:  59 kg (130 lb)   · Admission Body Wt:  130 lb    · Usual Body Wt:        · Ideal Body Wt:  142 lbs:  91.5 %   · Adjusted Body Weight:   ; Weight Adjustment for: No adjustment   · Adjusted BMI:       · BMI Category:  Normal weight (BMI 18.5-24. 9)       Nutrition Diagnosis:   · Increased nutrient needs related to increased demand for energy/nutrients (sepsis) as evidenced by other (specify) (sepsis)      Nutrition Interventions:   Food and/or Nutrient Delivery: Modify current diet, Start oral nutrition supplement  Nutrition Education and Counseling: Education not appropriate  Coordination of Nutrition Care: Continue to monitor while inpatient    Goals:  glucose , Hgb A1c < 7, Pt to eat > 75% of meals and supplement, BM q 1-3 days       Nutrition Monitoring and Evaluation:   Behavioral-Environmental Outcomes:    Food/Nutrient Intake Outcomes: Food and nutrient intake, Supplement intake  Physical Signs/Symptoms Outcomes: Biochemical data, Meal time behavior, Weight, Constipation     9/10/2021    Discharge Planning:    Continue current diet, Continue oral nutrition supplement     Electronically signed by Joice Cheadle on 9/3/2021 at 3:32 PM    Contact: CELIA [phone]

## (undated) DEVICE — FORCEPS BX L240CM JAW DIA2.8MM L CAP W/ NDL MIC MESH TOOTH

## (undated) DEVICE — FLUFF AND POLYMER UNDERPAD,EXTRA HEAVY: Brand: WINGS

## (undated) DEVICE — SNARE ENDO 2.4MMX230CM -- COLD EXACTO

## (undated) DEVICE — FLEX ADVANTAGE 1500CC: Brand: FLEX ADVANTAGE

## (undated) DEVICE — TRAP SPEC COLL POLYP POLYSTYR --

## (undated) DEVICE — CATH IV SAFE STR 22GX1IN BLU -- PROTECTIV PLUS

## (undated) DEVICE — Device

## (undated) DEVICE — MEDI-VAC NON-CONDUCTIVE SUCTION TUBING: Brand: CARDINAL HEALTH